# Patient Record
Sex: FEMALE | Race: WHITE | NOT HISPANIC OR LATINO | URBAN - METROPOLITAN AREA
[De-identification: names, ages, dates, MRNs, and addresses within clinical notes are randomized per-mention and may not be internally consistent; named-entity substitution may affect disease eponyms.]

---

## 2023-07-27 ENCOUNTER — INPATIENT (INPATIENT)
Facility: HOSPITAL | Age: 89
LOS: 2 days | Discharge: HOME HEALTH | DRG: 682 | End: 2023-07-30
Attending: INTERNAL MEDICINE | Admitting: INTERNAL MEDICINE
Payer: MEDICARE

## 2023-07-27 VITALS
TEMPERATURE: 97 F | SYSTOLIC BLOOD PRESSURE: 116 MMHG | RESPIRATION RATE: 20 BRPM | HEART RATE: 68 BPM | DIASTOLIC BLOOD PRESSURE: 56 MMHG | OXYGEN SATURATION: 97 %

## 2023-07-27 DIAGNOSIS — E86.0 DEHYDRATION: ICD-10-CM

## 2023-07-27 DIAGNOSIS — B35.1 TINEA UNGUIUM: ICD-10-CM

## 2023-07-27 DIAGNOSIS — Z74.01 BED CONFINEMENT STATUS: ICD-10-CM

## 2023-07-27 DIAGNOSIS — E87.5 HYPERKALEMIA: ICD-10-CM

## 2023-07-27 DIAGNOSIS — S82.209A UNSPECIFIED FRACTURE OF SHAFT OF UNSPECIFIED TIBIA, INITIAL ENCOUNTER FOR CLOSED FRACTURE: Chronic | ICD-10-CM

## 2023-07-27 DIAGNOSIS — Z88.0 ALLERGY STATUS TO PENICILLIN: ICD-10-CM

## 2023-07-27 DIAGNOSIS — R53.2 FUNCTIONAL QUADRIPLEGIA: ICD-10-CM

## 2023-07-27 DIAGNOSIS — M48.00 SPINAL STENOSIS, SITE UNSPECIFIED: ICD-10-CM

## 2023-07-27 DIAGNOSIS — I95.9 HYPOTENSION, UNSPECIFIED: ICD-10-CM

## 2023-07-27 DIAGNOSIS — N18.4 CHRONIC KIDNEY DISEASE, STAGE 4 (SEVERE): ICD-10-CM

## 2023-07-27 DIAGNOSIS — N39.0 URINARY TRACT INFECTION, SITE NOT SPECIFIED: ICD-10-CM

## 2023-07-27 DIAGNOSIS — R13.10 DYSPHAGIA, UNSPECIFIED: ICD-10-CM

## 2023-07-27 DIAGNOSIS — B96.20 UNSPECIFIED ESCHERICHIA COLI [E. COLI] AS THE CAUSE OF DISEASES CLASSIFIED ELSEWHERE: ICD-10-CM

## 2023-07-27 DIAGNOSIS — I45.2 BIFASCICULAR BLOCK: ICD-10-CM

## 2023-07-27 DIAGNOSIS — Z66 DO NOT RESUSCITATE: ICD-10-CM

## 2023-07-27 DIAGNOSIS — Z88.6 ALLERGY STATUS TO ANALGESIC AGENT: ICD-10-CM

## 2023-07-27 DIAGNOSIS — Z95.818 PRESENCE OF OTHER CARDIAC IMPLANTS AND GRAFTS: Chronic | ICD-10-CM

## 2023-07-27 DIAGNOSIS — K43.9 VENTRAL HERNIA WITHOUT OBSTRUCTION OR GANGRENE: ICD-10-CM

## 2023-07-27 DIAGNOSIS — R79.89 OTHER SPECIFIED ABNORMAL FINDINGS OF BLOOD CHEMISTRY: ICD-10-CM

## 2023-07-27 DIAGNOSIS — F03.90 UNSPECIFIED DEMENTIA WITHOUT BEHAVIORAL DISTURBANCE: ICD-10-CM

## 2023-07-27 DIAGNOSIS — I44.0 ATRIOVENTRICULAR BLOCK, FIRST DEGREE: ICD-10-CM

## 2023-07-27 DIAGNOSIS — I12.9 HYPERTENSIVE CHRONIC KIDNEY DISEASE WITH STAGE 1 THROUGH STAGE 4 CHRONIC KIDNEY DISEASE, OR UNSPECIFIED CHRONIC KIDNEY DISEASE: ICD-10-CM

## 2023-07-27 DIAGNOSIS — I48.20 CHRONIC ATRIAL FIBRILLATION, UNSPECIFIED: ICD-10-CM

## 2023-07-27 DIAGNOSIS — C84.00 MYCOSIS FUNGOIDES, UNSPECIFIED SITE: ICD-10-CM

## 2023-07-27 DIAGNOSIS — Z90.710 ACQUIRED ABSENCE OF BOTH CERVIX AND UTERUS: ICD-10-CM

## 2023-07-27 DIAGNOSIS — R00.1 BRADYCARDIA, UNSPECIFIED: ICD-10-CM

## 2023-07-27 DIAGNOSIS — Z86.73 PERSONAL HISTORY OF TRANSIENT ISCHEMIC ATTACK (TIA), AND CEREBRAL INFARCTION WITHOUT RESIDUAL DEFICITS: ICD-10-CM

## 2023-07-27 DIAGNOSIS — N17.9 ACUTE KIDNEY FAILURE, UNSPECIFIED: ICD-10-CM

## 2023-07-27 DIAGNOSIS — R82.71 BACTERIURIA: ICD-10-CM

## 2023-07-27 LAB
ALBUMIN SERPL ELPH-MCNC: 3.9 G/DL — SIGNIFICANT CHANGE UP (ref 3.5–5.2)
ALBUMIN SERPL ELPH-MCNC: 4.2 G/DL — SIGNIFICANT CHANGE UP (ref 3.5–5.2)
ALP SERPL-CCNC: 110 U/L — SIGNIFICANT CHANGE UP (ref 30–115)
ALP SERPL-CCNC: 116 U/L — HIGH (ref 30–115)
ALT FLD-CCNC: 9 U/L — SIGNIFICANT CHANGE UP (ref 0–41)
ALT FLD-CCNC: 9 U/L — SIGNIFICANT CHANGE UP (ref 0–41)
ANION GAP SERPL CALC-SCNC: 13 MMOL/L — SIGNIFICANT CHANGE UP (ref 7–14)
ANION GAP SERPL CALC-SCNC: 14 MMOL/L — SIGNIFICANT CHANGE UP (ref 7–14)
ANION GAP SERPL CALC-SCNC: 14 MMOL/L — SIGNIFICANT CHANGE UP (ref 7–14)
ANION GAP SERPL CALC-SCNC: 15 MMOL/L — HIGH (ref 7–14)
APPEARANCE UR: CLEAR — SIGNIFICANT CHANGE UP
AST SERPL-CCNC: 11 U/L — SIGNIFICANT CHANGE UP (ref 0–41)
AST SERPL-CCNC: 18 U/L — SIGNIFICANT CHANGE UP (ref 0–41)
BASOPHILS # BLD AUTO: 0.04 K/UL — SIGNIFICANT CHANGE UP (ref 0–0.2)
BASOPHILS NFR BLD AUTO: 0.5 % — SIGNIFICANT CHANGE UP (ref 0–1)
BILIRUB DIRECT SERPL-MCNC: <0.2 MG/DL — SIGNIFICANT CHANGE UP (ref 0–0.3)
BILIRUB INDIRECT FLD-MCNC: >0.2 MG/DL — SIGNIFICANT CHANGE UP (ref 0.2–1.2)
BILIRUB SERPL-MCNC: 0.4 MG/DL — SIGNIFICANT CHANGE UP (ref 0.2–1.2)
BILIRUB SERPL-MCNC: 0.4 MG/DL — SIGNIFICANT CHANGE UP (ref 0.2–1.2)
BILIRUB SERPL-MCNC: 0.5 MG/DL — SIGNIFICANT CHANGE UP (ref 0.2–1.2)
BILIRUB UR-MCNC: NEGATIVE — SIGNIFICANT CHANGE UP
BUN SERPL-MCNC: 48 MG/DL — HIGH (ref 10–20)
BUN SERPL-MCNC: 50 MG/DL — HIGH (ref 10–20)
CALCIUM SERPL-MCNC: 8.7 MG/DL — SIGNIFICANT CHANGE UP (ref 8.4–10.5)
CALCIUM SERPL-MCNC: 8.9 MG/DL — SIGNIFICANT CHANGE UP (ref 8.4–10.5)
CALCIUM SERPL-MCNC: 9 MG/DL — SIGNIFICANT CHANGE UP (ref 8.4–10.5)
CALCIUM SERPL-MCNC: 9.3 MG/DL — SIGNIFICANT CHANGE UP (ref 8.4–10.5)
CHLORIDE SERPL-SCNC: 95 MMOL/L — LOW (ref 98–110)
CHLORIDE SERPL-SCNC: 95 MMOL/L — LOW (ref 98–110)
CHLORIDE SERPL-SCNC: 97 MMOL/L — LOW (ref 98–110)
CHLORIDE SERPL-SCNC: 99 MMOL/L — SIGNIFICANT CHANGE UP (ref 98–110)
CK MB CFR SERPL CALC: 1.7 NG/ML — SIGNIFICANT CHANGE UP (ref 0.6–6.3)
CK SERPL-CCNC: 67 U/L — SIGNIFICANT CHANGE UP (ref 0–225)
CO2 SERPL-SCNC: 18 MMOL/L — SIGNIFICANT CHANGE UP (ref 17–32)
CO2 SERPL-SCNC: 20 MMOL/L — SIGNIFICANT CHANGE UP (ref 17–32)
CO2 SERPL-SCNC: 22 MMOL/L — SIGNIFICANT CHANGE UP (ref 17–32)
CO2 SERPL-SCNC: 22 MMOL/L — SIGNIFICANT CHANGE UP (ref 17–32)
COLOR SPEC: YELLOW — SIGNIFICANT CHANGE UP
CREAT SERPL-MCNC: 2.6 MG/DL — HIGH (ref 0.7–1.5)
CREAT SERPL-MCNC: 2.7 MG/DL — HIGH (ref 0.7–1.5)
CREAT SERPL-MCNC: 2.8 MG/DL — HIGH (ref 0.7–1.5)
CREAT SERPL-MCNC: 2.8 MG/DL — HIGH (ref 0.7–1.5)
DIFF PNL FLD: ABNORMAL
EGFR: 15 ML/MIN/1.73M2 — LOW
EGFR: 15 ML/MIN/1.73M2 — LOW
EGFR: 16 ML/MIN/1.73M2 — LOW
EGFR: 17 ML/MIN/1.73M2 — LOW
EOSINOPHIL # BLD AUTO: 0.21 K/UL — SIGNIFICANT CHANGE UP (ref 0–0.7)
EOSINOPHIL NFR BLD AUTO: 2.7 % — SIGNIFICANT CHANGE UP (ref 0–8)
GLUCOSE SERPL-MCNC: 106 MG/DL — HIGH (ref 70–99)
GLUCOSE SERPL-MCNC: 122 MG/DL — HIGH (ref 70–99)
GLUCOSE SERPL-MCNC: 145 MG/DL — HIGH (ref 70–99)
GLUCOSE SERPL-MCNC: 89 MG/DL — SIGNIFICANT CHANGE UP (ref 70–99)
GLUCOSE UR QL: NEGATIVE MG/DL — SIGNIFICANT CHANGE UP
HCT VFR BLD CALC: 33.8 % — LOW (ref 37–47)
HGB BLD-MCNC: 11 G/DL — LOW (ref 12–16)
IMM GRANULOCYTES NFR BLD AUTO: 0.4 % — HIGH (ref 0.1–0.3)
KETONES UR-MCNC: NEGATIVE — SIGNIFICANT CHANGE UP
LEUKOCYTE ESTERASE UR-ACNC: ABNORMAL
LIDOCAIN IGE QN: 49 U/L — SIGNIFICANT CHANGE UP (ref 7–60)
LYMPHOCYTES # BLD AUTO: 1.09 K/UL — LOW (ref 1.2–3.4)
LYMPHOCYTES # BLD AUTO: 13.9 % — LOW (ref 20.5–51.1)
MAGNESIUM SERPL-MCNC: 2.2 MG/DL — SIGNIFICANT CHANGE UP (ref 1.8–2.4)
MCHC RBC-ENTMCNC: 29.6 PG — SIGNIFICANT CHANGE UP (ref 27–31)
MCHC RBC-ENTMCNC: 32.5 G/DL — SIGNIFICANT CHANGE UP (ref 32–37)
MCV RBC AUTO: 91.1 FL — SIGNIFICANT CHANGE UP (ref 81–99)
MONOCYTES # BLD AUTO: 0.42 K/UL — SIGNIFICANT CHANGE UP (ref 0.1–0.6)
MONOCYTES NFR BLD AUTO: 5.4 % — SIGNIFICANT CHANGE UP (ref 1.7–9.3)
NEUTROPHILS # BLD AUTO: 6.05 K/UL — SIGNIFICANT CHANGE UP (ref 1.4–6.5)
NEUTROPHILS NFR BLD AUTO: 77.1 % — HIGH (ref 42.2–75.2)
NITRITE UR-MCNC: NEGATIVE — SIGNIFICANT CHANGE UP
NRBC # BLD: 0 /100 WBCS — SIGNIFICANT CHANGE UP (ref 0–0)
NT-PROBNP SERPL-SCNC: 572 PG/ML — HIGH (ref 0–300)
PH UR: 6 — SIGNIFICANT CHANGE UP (ref 5–8)
PLATELET # BLD AUTO: 304 K/UL — SIGNIFICANT CHANGE UP (ref 130–400)
PMV BLD: 9.6 FL — SIGNIFICANT CHANGE UP (ref 7.4–10.4)
POTASSIUM SERPL-MCNC: 4.9 MMOL/L — SIGNIFICANT CHANGE UP (ref 3.5–5)
POTASSIUM SERPL-MCNC: 5.4 MMOL/L — HIGH (ref 3.5–5)
POTASSIUM SERPL-MCNC: 5.6 MMOL/L — HIGH (ref 3.5–5)
POTASSIUM SERPL-MCNC: 6.4 MMOL/L — CRITICAL HIGH (ref 3.5–5)
POTASSIUM SERPL-SCNC: 4.9 MMOL/L — SIGNIFICANT CHANGE UP (ref 3.5–5)
POTASSIUM SERPL-SCNC: 5.4 MMOL/L — HIGH (ref 3.5–5)
POTASSIUM SERPL-SCNC: 5.6 MMOL/L — HIGH (ref 3.5–5)
POTASSIUM SERPL-SCNC: 6.4 MMOL/L — CRITICAL HIGH (ref 3.5–5)
PROT SERPL-MCNC: 7.1 G/DL — SIGNIFICANT CHANGE UP (ref 6–8)
PROT SERPL-MCNC: 7.4 G/DL — SIGNIFICANT CHANGE UP (ref 6–8)
PROT UR-MCNC: ABNORMAL MG/DL
RBC # BLD: 3.71 M/UL — LOW (ref 4.2–5.4)
RBC # FLD: 13.2 % — SIGNIFICANT CHANGE UP (ref 11.5–14.5)
SODIUM SERPL-SCNC: 129 MMOL/L — LOW (ref 135–146)
SODIUM SERPL-SCNC: 130 MMOL/L — LOW (ref 135–146)
SODIUM SERPL-SCNC: 131 MMOL/L — LOW (ref 135–146)
SODIUM SERPL-SCNC: 134 MMOL/L — LOW (ref 135–146)
SP GR SPEC: 1.01 — SIGNIFICANT CHANGE UP (ref 1.01–1.03)
TROPONIN T SERPL-MCNC: 0.02 NG/ML — HIGH
TROPONIN T SERPL-MCNC: <0.01 NG/ML — SIGNIFICANT CHANGE UP
UROBILINOGEN FLD QL: 0.2 MG/DL — SIGNIFICANT CHANGE UP
WBC # BLD: 7.84 K/UL — SIGNIFICANT CHANGE UP (ref 4.8–10.8)
WBC # FLD AUTO: 7.84 K/UL — SIGNIFICANT CHANGE UP (ref 4.8–10.8)

## 2023-07-27 PROCEDURE — 74176 CT ABD & PELVIS W/O CONTRAST: CPT | Mod: 26,MA

## 2023-07-27 PROCEDURE — 82553 CREATINE MB FRACTION: CPT

## 2023-07-27 PROCEDURE — 85025 COMPLETE CBC W/AUTO DIFF WBC: CPT

## 2023-07-27 PROCEDURE — 92610 EVALUATE SWALLOWING FUNCTION: CPT | Mod: GN

## 2023-07-27 PROCEDURE — 80053 COMPREHEN METABOLIC PANEL: CPT

## 2023-07-27 PROCEDURE — 87040 BLOOD CULTURE FOR BACTERIA: CPT

## 2023-07-27 PROCEDURE — 80048 BASIC METABOLIC PNL TOTAL CA: CPT

## 2023-07-27 PROCEDURE — 99223 1ST HOSP IP/OBS HIGH 75: CPT

## 2023-07-27 PROCEDURE — 82962 GLUCOSE BLOOD TEST: CPT

## 2023-07-27 PROCEDURE — 71250 CT THORAX DX C-: CPT | Mod: 26,MA

## 2023-07-27 PROCEDURE — 99285 EMERGENCY DEPT VISIT HI MDM: CPT | Mod: FS

## 2023-07-27 PROCEDURE — 97162 PT EVAL MOD COMPLEX 30 MIN: CPT | Mod: GP

## 2023-07-27 PROCEDURE — 80061 LIPID PANEL: CPT

## 2023-07-27 PROCEDURE — 93306 TTE W/DOPPLER COMPLETE: CPT

## 2023-07-27 PROCEDURE — 76770 US EXAM ABDO BACK WALL COMP: CPT | Mod: 26

## 2023-07-27 PROCEDURE — 71045 X-RAY EXAM CHEST 1 VIEW: CPT | Mod: 26

## 2023-07-27 PROCEDURE — 83735 ASSAY OF MAGNESIUM: CPT

## 2023-07-27 PROCEDURE — 36415 COLL VENOUS BLD VENIPUNCTURE: CPT

## 2023-07-27 PROCEDURE — 76770 US EXAM ABDO BACK WALL COMP: CPT

## 2023-07-27 PROCEDURE — 82550 ASSAY OF CK (CPK): CPT

## 2023-07-27 PROCEDURE — 84484 ASSAY OF TROPONIN QUANT: CPT

## 2023-07-27 PROCEDURE — 93005 ELECTROCARDIOGRAM TRACING: CPT

## 2023-07-27 RX ORDER — ALBUTEROL 90 UG/1
2.5 AEROSOL, METERED ORAL ONCE
Refills: 0 | Status: COMPLETED | OUTPATIENT
Start: 2023-07-27 | End: 2023-07-27

## 2023-07-27 RX ORDER — CITALOPRAM 10 MG/1
10 TABLET, FILM COATED ORAL AT BEDTIME
Refills: 0 | Status: DISCONTINUED | OUTPATIENT
Start: 2023-07-27 | End: 2023-07-30

## 2023-07-27 RX ORDER — CEFTRIAXONE 500 MG/1
1000 INJECTION, POWDER, FOR SOLUTION INTRAMUSCULAR; INTRAVENOUS EVERY 24 HOURS
Refills: 0 | Status: COMPLETED | OUTPATIENT
Start: 2023-07-27 | End: 2023-07-30

## 2023-07-27 RX ORDER — AMIODARONE HYDROCHLORIDE 400 MG/1
100 TABLET ORAL DAILY
Refills: 0 | Status: DISCONTINUED | OUTPATIENT
Start: 2023-07-27 | End: 2023-07-30

## 2023-07-27 RX ORDER — FUROSEMIDE 40 MG
1 TABLET ORAL
Refills: 0 | DISCHARGE

## 2023-07-27 RX ORDER — AMIODARONE HYDROCHLORIDE 400 MG/1
1 TABLET ORAL
Refills: 0 | DISCHARGE

## 2023-07-27 RX ORDER — SODIUM ZIRCONIUM CYCLOSILICATE 10 G/10G
10 POWDER, FOR SUSPENSION ORAL ONCE
Refills: 0 | Status: COMPLETED | OUTPATIENT
Start: 2023-07-27 | End: 2023-07-27

## 2023-07-27 RX ORDER — SPIRONOLACTONE 25 MG/1
1 TABLET, FILM COATED ORAL
Refills: 0 | DISCHARGE

## 2023-07-27 RX ORDER — ONDANSETRON 8 MG/1
4 TABLET, FILM COATED ORAL EVERY 8 HOURS
Refills: 0 | Status: DISCONTINUED | OUTPATIENT
Start: 2023-07-27 | End: 2023-07-30

## 2023-07-27 RX ORDER — AMLODIPINE BESYLATE 2.5 MG/1
1 TABLET ORAL
Refills: 0 | DISCHARGE

## 2023-07-27 RX ORDER — PREGABALIN 225 MG/1
1000 CAPSULE ORAL DAILY
Refills: 0 | Status: DISCONTINUED | OUTPATIENT
Start: 2023-07-27 | End: 2023-07-30

## 2023-07-27 RX ORDER — FOLIC ACID 0.8 MG
1 TABLET ORAL AT BEDTIME
Refills: 0 | Status: DISCONTINUED | OUTPATIENT
Start: 2023-07-27 | End: 2023-07-30

## 2023-07-27 RX ORDER — FUROSEMIDE 40 MG
20 TABLET ORAL ONCE
Refills: 0 | Status: COMPLETED | OUTPATIENT
Start: 2023-07-27 | End: 2023-07-27

## 2023-07-27 RX ORDER — PANTOPRAZOLE SODIUM 20 MG/1
40 TABLET, DELAYED RELEASE ORAL
Refills: 0 | Status: DISCONTINUED | OUTPATIENT
Start: 2023-07-27 | End: 2023-07-30

## 2023-07-27 RX ORDER — CLOPIDOGREL BISULFATE 75 MG/1
1 TABLET, FILM COATED ORAL
Refills: 0 | DISCHARGE

## 2023-07-27 RX ORDER — LANOLIN ALCOHOL/MO/W.PET/CERES
3 CREAM (GRAM) TOPICAL AT BEDTIME
Refills: 0 | Status: DISCONTINUED | OUTPATIENT
Start: 2023-07-27 | End: 2023-07-30

## 2023-07-27 RX ORDER — METOCLOPRAMIDE HCL 10 MG
10 TABLET ORAL ONCE
Refills: 0 | Status: DISCONTINUED | OUTPATIENT
Start: 2023-07-27 | End: 2023-07-27

## 2023-07-27 RX ORDER — CEFTRIAXONE 500 MG/1
1000 INJECTION, POWDER, FOR SOLUTION INTRAMUSCULAR; INTRAVENOUS ONCE
Refills: 0 | Status: COMPLETED | OUTPATIENT
Start: 2023-07-27 | End: 2023-07-27

## 2023-07-27 RX ORDER — VALSARTAN 80 MG/1
1 TABLET ORAL
Refills: 0 | DISCHARGE

## 2023-07-27 RX ORDER — FOLIC ACID 0.8 MG
1 TABLET ORAL
Refills: 0 | DISCHARGE

## 2023-07-27 RX ORDER — ONDANSETRON 8 MG/1
4 TABLET, FILM COATED ORAL EVERY 8 HOURS
Refills: 0 | Status: DISCONTINUED | OUTPATIENT
Start: 2023-07-27 | End: 2023-07-27

## 2023-07-27 RX ORDER — CITALOPRAM 10 MG/1
1 TABLET, FILM COATED ORAL
Refills: 0 | DISCHARGE

## 2023-07-27 RX ORDER — CHLORHEXIDINE GLUCONATE 213 G/1000ML
1 SOLUTION TOPICAL
Refills: 0 | Status: DISCONTINUED | OUTPATIENT
Start: 2023-07-27 | End: 2023-07-30

## 2023-07-27 RX ORDER — HEPARIN SODIUM 5000 [USP'U]/ML
5000 INJECTION INTRAVENOUS; SUBCUTANEOUS EVERY 12 HOURS
Refills: 0 | Status: DISCONTINUED | OUTPATIENT
Start: 2023-07-27 | End: 2023-07-30

## 2023-07-27 RX ORDER — ACETAMINOPHEN 500 MG
650 TABLET ORAL EVERY 6 HOURS
Refills: 0 | Status: DISCONTINUED | OUTPATIENT
Start: 2023-07-27 | End: 2023-07-30

## 2023-07-27 RX ORDER — SODIUM CHLORIDE 9 MG/ML
1000 INJECTION INTRAMUSCULAR; INTRAVENOUS; SUBCUTANEOUS
Refills: 0 | Status: DISCONTINUED | OUTPATIENT
Start: 2023-07-27 | End: 2023-07-27

## 2023-07-27 RX ORDER — SODIUM ZIRCONIUM CYCLOSILICATE 10 G/10G
5 POWDER, FOR SUSPENSION ORAL DAILY
Refills: 0 | Status: DISCONTINUED | OUTPATIENT
Start: 2023-07-27 | End: 2023-07-27

## 2023-07-27 RX ORDER — SODIUM CHLORIDE 9 MG/ML
500 INJECTION INTRAMUSCULAR; INTRAVENOUS; SUBCUTANEOUS ONCE
Refills: 0 | Status: COMPLETED | OUTPATIENT
Start: 2023-07-27 | End: 2023-07-27

## 2023-07-27 RX ORDER — CLOPIDOGREL BISULFATE 75 MG/1
75 TABLET, FILM COATED ORAL DAILY
Refills: 0 | Status: DISCONTINUED | OUTPATIENT
Start: 2023-07-28 | End: 2023-07-30

## 2023-07-27 RX ORDER — SODIUM CHLORIDE 9 MG/ML
1000 INJECTION INTRAMUSCULAR; INTRAVENOUS; SUBCUTANEOUS
Refills: 0 | Status: DISCONTINUED | OUTPATIENT
Start: 2023-07-27 | End: 2023-07-28

## 2023-07-27 RX ORDER — ALBUTEROL 90 UG/1
10 AEROSOL, METERED ORAL ONCE
Refills: 0 | Status: DISCONTINUED | OUTPATIENT
Start: 2023-07-27 | End: 2023-07-27

## 2023-07-27 RX ADMIN — Medication 20 MILLIGRAM(S): at 17:57

## 2023-07-27 RX ADMIN — SODIUM CHLORIDE 50 MILLILITER(S): 9 INJECTION INTRAMUSCULAR; INTRAVENOUS; SUBCUTANEOUS at 21:29

## 2023-07-27 RX ADMIN — SODIUM CHLORIDE 500 MILLILITER(S): 9 INJECTION INTRAMUSCULAR; INTRAVENOUS; SUBCUTANEOUS at 17:15

## 2023-07-27 RX ADMIN — CITALOPRAM 10 MILLIGRAM(S): 10 TABLET, FILM COATED ORAL at 21:28

## 2023-07-27 RX ADMIN — CEFTRIAXONE 100 MILLIGRAM(S): 500 INJECTION, POWDER, FOR SOLUTION INTRAMUSCULAR; INTRAVENOUS at 19:01

## 2023-07-27 RX ADMIN — ALBUTEROL 2.5 MILLIGRAM(S): 90 AEROSOL, METERED ORAL at 19:16

## 2023-07-27 RX ADMIN — Medication 1 MILLIGRAM(S): at 21:28

## 2023-07-27 RX ADMIN — SODIUM ZIRCONIUM CYCLOSILICATE 10 GRAM(S): 10 POWDER, FOR SUSPENSION ORAL at 18:02

## 2023-07-27 NOTE — ED ADULT TRIAGE NOTE - CHIEF COMPLAINT QUOTE
BIBA from home, as per EMS family reported patients blood pressure has been low x 1 week (70 systolic) and had one episode of vomiting today.

## 2023-07-27 NOTE — H&P ADULT - ASSESSMENT
A 89 y/o F with pmhx of bedbound,  HTN, CVA without residual deficits, dementia, A-fib status post Watchman, ventral hernia,  admission in May for right tib-fib fracture   brought in for multiple emesis this morning after eating breakfast.      #UTI  #hx urinary incontinence s/p straight cath   -IV abx Rocephin 1 g q 24 hrs  -follow up Ucx  -monitor for fever     #NINA   s/p 500 cc bolus   -trend cr level   -gentle IV hydration 50 cc x 12 hours   -monitor I and O   -avoid nephrotoxic meds    #hyperkalemia   -s/p LOKELMA   -repeat BMP at 10 pm   -monitor K level     #elevated troponin likely due to NINA   EKG NS bradycardia with 1st degree A-V block  Incomplete left bundle branch block  -trend cardiac enzymes  -cardiac monitor   -repeat EKG in am     #nausea/vomiting   CT a/p Ventral hernia measuring 8.5 cm containing short segment of   nonobstructed transverse colon  -abdomen soft , hernia reducible  -zofran prn   -ppi  -gentle hydration        #hx HTN   -patient hypotensive at home BP 73/33  -will hold BP meds     #hx  CVA without residual deficits  -cw Plavix       #hx A-fib status post Watchman  cw amiodarone with holding parameters     DVT prophylaxis   GI prophylaxis   CODE status DRN/DNI

## 2023-07-27 NOTE — ED PROVIDER NOTE - CLINICAL SUMMARY MEDICAL DECISION MAKING FREE TEXT BOX
89-year-old female with history of hypertension, CVA without residual deficits, dementia, A-fib status post Watchman, admission in May for right tib-fib fracture now at home with home health aide presents to the ED brought in by her daughter and health aide after an episode of multiple emesis, nonbloody nonbilious this morning after eating breakfast.  Patient is otherwise at her baseline and has been at her baseline throughout the week.  Since vomiting, per health aide patient also complaining of abdominal pain.  Patient's dementia limits her history otherwise.  Family members have not noted any diarrhea, not complaining of urinary symptoms, tolerating p.o. though less today.    On exam, vital signs within normal limits.  Head is normocephalic and atraumatic, patient in no acute distress.  Tacky mucous membranes.  Normal conjunctiva.  Extraocular movements intact, pupils equal round reactive to light.  Heart is regular rate and rhythm, lung exam notable for crackles to right base.  Abdomen is soft, there is an umbilical hernia without overlying skin changes which is reducible, minimally tender.  No other abdominal pain.  There is a stage I sacral ulcer which does not appear infected.  No peripheral edema.    Differential includes UTI, obstruction, electrolyte abnormality, ischemia, dehydration, gastroenteritis early, ACS    Plan for chest x-ray, CT abdomen pelvis, labs, gentle hydration, reassess

## 2023-07-27 NOTE — H&P ADULT - NS ATTEND AMEND GEN_ALL_CORE FT
patient seen and examined with PA at bedside on day of admission 7/27/23  daughter at bedside  plana above

## 2023-07-27 NOTE — H&P ADULT - HISTORY OF PRESENT ILLNESS
A 89 y/o F with pmhx of bedbound,  HTN, CVA without residual deficits, dementia, A-fib status post Watchman, ventral hernia,  admission in May for right tib-fib fracture   brought in for multiple emesis this morning after eating breakfast.   Pt poor historian due to dementia, per daughter at bedside patient vomited today few times NBNB after eating breakfast.  PT has denies any abdominal pain. Pt had and episode of loose stool, non bloody today. PT found to be hypotensive at home with BP 72/73. Pt denies dizziness or LOC.  PT denies chest pain or shortness of breath. Pt with hx of uti , last on May, Pt with incontinence, per daughter has been making good urine , no hematuria. PT denies fever, chills.

## 2023-07-27 NOTE — ED ADULT NURSE NOTE - NSFALLHARMRISKINTERV_ED_ALL_ED

## 2023-07-27 NOTE — PATIENT PROFILE ADULT - FALL HARM RISK - HARM RISK INTERVENTIONS

## 2023-07-27 NOTE — ED PROVIDER NOTE - NSICDXPASTMEDICALHX_GEN_ALL_CORE_FT
PAST MEDICAL HISTORY:  Chronic atrial fibrillation     CVA (cerebrovascular accident)     Dementia     HTN (hypertension)

## 2023-07-27 NOTE — ED PROVIDER NOTE - PHYSICAL EXAMINATION
Physical Exam    Constitutional: No acute distress.   Eyes: Conjunctiva pink, Sclera clear, PERRLA, EOMI.  ENT: No sinus tenderness. No nasal discharge. No oropharyngeal erythema, edema, or exudates. Uvula midline.   Cardiovascular: Regular rate, regular rhythm. No noted murmurs rubs or gallops.  Respiratory: unlabored respiratory effort, clear to auscultation bilaterally no wheezing, rales or rhonchi  Gastrointestinal: Normal bowel sounds. soft, non distended, Large reducible umbilical hernia  Musculoskeletal: supple neck, no midline tenderness. No joint or bony deformity.   Integumentary: warm, dry, no rash  Neurologic: awake, disoriented. non focal  Psychiatric: appropriate mood, appropriate affect

## 2023-07-27 NOTE — GOALS OF CARE CONVERSATION - ADVANCED CARE PLANNING - CONVERSATION DETAILS
Spoke to daughter about code status , current medical intervention and prognosis. The daughter wishes her mother to be DNR /DNI . Also addressed feeding tube /long term nutrition . At this time the daughter reported she needs to think about it. She wishes to continue with any other necessary medical intervention.

## 2023-07-27 NOTE — ED PROVIDER NOTE - CARE PLAN
1 Principal Discharge DX:	Acute UTI  Secondary Diagnosis:	Hyperkalemia  Secondary Diagnosis:	NINA (acute kidney injury)  Secondary Diagnosis:	Dehydration

## 2023-07-27 NOTE — H&P ADULT - NSHPLABSRESULTS_GEN_ALL_CORE
11.0   7.84  )-----------( 304      ( 27 Jul 2023 16:49 )             33.8       07-27    131<L>  |  95<L>  |  50<H>  ----------------------------<  89  5.6<H>   |  22  |  2.7<H>    Ca    9.3      27 Jul 2023 16:49  Mg     2.2     07-27    TPro  7.4  /  Alb  4.2  /  TBili  0.5  /  DBili  x   /  AST  11  /  ALT  9   /  AlkPhos  116<H>  07-27          Magnesium: 2.2 mg/dL (07-27-23 @ 14:39)          Urinalysis Basic - ( 27 Jul 2023 16:49 )    Color: x / Appearance: x / SG: x / pH: x  Gluc: 89 mg/dL / Ketone: x  / Bili: x / Urobili: x   Blood: x / Protein: x / Nitrite: x   Leuk Esterase: x / RBC: x / WBC x   Sq Epi: x / Non Sq Epi: x / Bacteria: x            Lactate Trend      CARDIAC MARKERS ( 27 Jul 2023 14:39 )  x     / 0.02 ng/mL / x     / x     / x                        < from: CT Chest No Cont (07.27.23 @ 17:52) >      IMPRESSION:    1. No evidence of acute intrathoracic or abdominal pathology.    2. Ventral hernia measuring 8.5 cm containing short segment of   nonobstructed transverse colon.    --- End of Report ---      DUYEN VIEIRA MD; Attending Radiologist  This document has been electronically signed. Jul 27 2023  6:03PM    < end of copied text >    < from: Xray Chest 1 View- PORTABLE-Urgent (07.27.23 @ 15:06) >    Impression:    Low lung volumes with likely vascular crowding versus congestion.    No pneumothorax.    --- End of Report ---            ELIAS TOUSSAINT MD; Attending Radiologist  This document has been electronically signed. Jul 27 2023  5:29PM    < end of copied text >

## 2023-07-27 NOTE — H&P ADULT - NSHPPHYSICALEXAM_GEN_ALL_CORE
VITALS:   ICU Vital Signs Last 24 Hrs  T(C): 36.2 (27 Jul 2023 13:42), Max: 36.2 (27 Jul 2023 13:42)  T(F): 97.1 (27 Jul 2023 13:42), Max: 97.1 (27 Jul 2023 13:42)  HR: 61 (27 Jul 2023 18:44) (61 - 68)  BP: 120/58 (27 Jul 2023 18:44) (116/56 - 120/58)  RR: 18 (27 Jul 2023 18:44) (18 - 20)  SpO2: 98% (27 Jul 2023 18:44) (97% - 98%)    O2 Parameters below as of 27 Jul 2023 18:44  Patient On (Oxygen Delivery Method): room air    GENERAL: NAD, lying in bed comfortably  HEAD:  Atraumatic, Normocephalic  EYES: EOMI, PERRLA, conjunctiva and sclera clear  ENT: Moist mucous membranes  NECK: Supple, No JVD  CHEST/LUNG: Clear to auscultation bilaterally; No rales, rhonchi, wheezing, or rubs. Unlabored respirations on RA sat 97 %  HEART:  neelima 56, Regular rate and rhythm; No murmurs, rubs, or gallops  ABDOMEN: obese, ventral hernia, soft reducible ,non tender,  Nondistended.   EXTREMITIES: no edema or tenderness   NERVOUS SYSTEM:  Alert & Oriented X3, speech clear. No deficits   MSK: FROM all 4 extremities, full and equal strength  SKIN: stage I sacral ulcer

## 2023-07-27 NOTE — ED PROVIDER NOTE - NSICDXPASTSURGICALHX_GEN_ALL_CORE_FT
PAST SURGICAL HISTORY:  Fracture, tibia and fibula     Presence of Watchman left atrial appendage closure device

## 2023-07-27 NOTE — ED PROVIDER NOTE - OBJECTIVE STATEMENT
89 year old female with a history of HTN, Afib s/p watchman, CVA 6 years ago, Dementia, Spinal stenosis, Hysterectomy, 89 year old female with a history of HTN, Afib s/p watchman, CVA 6 years ago, Dementia, Spinal stenosis, Hysterectomy, presents to the ED with her home attendant and daughter reporting low blood pressure x 1 week. Patient is poor historian due to mental status therefore her Daughter is providing history. States that visiting nurse noted BP low this week and SBP down to 70 today. Daughter notes more fatigue, decreased appetite and 1 episode of vomiting today. Otherwise denies new symptoms such as fever, difficulty breathing, chest pains, diarrhea, changes in urination, skin rash/breakdown.

## 2023-07-27 NOTE — ED PROVIDER NOTE - PROGRESS NOTE DETAILS
BF: elevated creatinnine, no prior for comparison. HyperK without EKg changes other than mild bradycardia, given albuterol, lasix, and lokelma to treat. Mild anemia, unknown chronicity. Imaging unremarkable. UA notable for UTI. Admitted to [facility].

## 2023-07-27 NOTE — H&P ADULT - CONVERSATION DETAILS
Current plan of care including prognosis discussed by attending  with patient and daughter  all options were discussed and opted for DNR/DNI understanding of what is involved in detail. Attending filled  MOLST form .

## 2023-07-28 LAB
ALBUMIN SERPL ELPH-MCNC: 4 G/DL — SIGNIFICANT CHANGE UP (ref 3.5–5.2)
ALP SERPL-CCNC: 106 U/L — SIGNIFICANT CHANGE UP (ref 30–115)
ALT FLD-CCNC: 7 U/L — SIGNIFICANT CHANGE UP (ref 0–41)
ANION GAP SERPL CALC-SCNC: 11 MMOL/L — SIGNIFICANT CHANGE UP (ref 7–14)
ANION GAP SERPL CALC-SCNC: 14 MMOL/L — SIGNIFICANT CHANGE UP (ref 7–14)
AST SERPL-CCNC: 10 U/L — SIGNIFICANT CHANGE UP (ref 0–41)
BILIRUB SERPL-MCNC: 0.4 MG/DL — SIGNIFICANT CHANGE UP (ref 0.2–1.2)
BUN SERPL-MCNC: 46 MG/DL — HIGH (ref 10–20)
BUN SERPL-MCNC: 47 MG/DL — HIGH (ref 10–20)
CALCIUM SERPL-MCNC: 9 MG/DL — SIGNIFICANT CHANGE UP (ref 8.4–10.5)
CALCIUM SERPL-MCNC: 9.5 MG/DL — SIGNIFICANT CHANGE UP (ref 8.4–10.5)
CHLORIDE SERPL-SCNC: 100 MMOL/L — SIGNIFICANT CHANGE UP (ref 98–110)
CHLORIDE SERPL-SCNC: 101 MMOL/L — SIGNIFICANT CHANGE UP (ref 98–110)
CK MB CFR SERPL CALC: 2 NG/ML — SIGNIFICANT CHANGE UP (ref 0.6–6.3)
CK SERPL-CCNC: 58 U/L — SIGNIFICANT CHANGE UP (ref 0–225)
CO2 SERPL-SCNC: 23 MMOL/L — SIGNIFICANT CHANGE UP (ref 17–32)
CO2 SERPL-SCNC: 25 MMOL/L — SIGNIFICANT CHANGE UP (ref 17–32)
CREAT SERPL-MCNC: 2.6 MG/DL — HIGH (ref 0.7–1.5)
CREAT SERPL-MCNC: 2.6 MG/DL — HIGH (ref 0.7–1.5)
EGFR: 17 ML/MIN/1.73M2 — LOW
EGFR: 17 ML/MIN/1.73M2 — LOW
GLUCOSE BLDC GLUCOMTR-MCNC: 117 MG/DL — HIGH (ref 70–99)
GLUCOSE SERPL-MCNC: 89 MG/DL — SIGNIFICANT CHANGE UP (ref 70–99)
GLUCOSE SERPL-MCNC: 95 MG/DL — SIGNIFICANT CHANGE UP (ref 70–99)
MAGNESIUM SERPL-MCNC: 2.2 MG/DL — SIGNIFICANT CHANGE UP (ref 1.8–2.4)
POTASSIUM SERPL-MCNC: 5.1 MMOL/L — HIGH (ref 3.5–5)
POTASSIUM SERPL-MCNC: 5.4 MMOL/L — HIGH (ref 3.5–5)
POTASSIUM SERPL-SCNC: 5.1 MMOL/L — HIGH (ref 3.5–5)
POTASSIUM SERPL-SCNC: 5.4 MMOL/L — HIGH (ref 3.5–5)
PROT SERPL-MCNC: 6.8 G/DL — SIGNIFICANT CHANGE UP (ref 6–8)
SODIUM SERPL-SCNC: 134 MMOL/L — LOW (ref 135–146)
SODIUM SERPL-SCNC: 140 MMOL/L — SIGNIFICANT CHANGE UP (ref 135–146)
TROPONIN T SERPL-MCNC: 0.01 NG/ML — SIGNIFICANT CHANGE UP

## 2023-07-28 PROCEDURE — 99221 1ST HOSP IP/OBS SF/LOW 40: CPT | Mod: GC

## 2023-07-28 PROCEDURE — 99233 SBSQ HOSP IP/OBS HIGH 50: CPT

## 2023-07-28 PROCEDURE — 93306 TTE W/DOPPLER COMPLETE: CPT | Mod: 26

## 2023-07-28 PROCEDURE — 93010 ELECTROCARDIOGRAM REPORT: CPT

## 2023-07-28 RX ORDER — SODIUM ZIRCONIUM CYCLOSILICATE 10 G/10G
10 POWDER, FOR SUSPENSION ORAL ONCE
Refills: 0 | Status: COMPLETED | OUTPATIENT
Start: 2023-07-28 | End: 2023-07-28

## 2023-07-28 RX ORDER — SENNA PLUS 8.6 MG/1
2 TABLET ORAL AT BEDTIME
Refills: 0 | Status: DISCONTINUED | OUTPATIENT
Start: 2023-07-28 | End: 2023-07-30

## 2023-07-28 RX ORDER — POLYETHYLENE GLYCOL 3350 17 G/17G
17 POWDER, FOR SOLUTION ORAL DAILY
Refills: 0 | Status: DISCONTINUED | OUTPATIENT
Start: 2023-07-28 | End: 2023-07-30

## 2023-07-28 RX ADMIN — PREGABALIN 1000 MICROGRAM(S): 225 CAPSULE ORAL at 15:28

## 2023-07-28 RX ADMIN — HEPARIN SODIUM 5000 UNIT(S): 5000 INJECTION INTRAVENOUS; SUBCUTANEOUS at 19:11

## 2023-07-28 RX ADMIN — HEPARIN SODIUM 5000 UNIT(S): 5000 INJECTION INTRAVENOUS; SUBCUTANEOUS at 06:13

## 2023-07-28 RX ADMIN — SODIUM ZIRCONIUM CYCLOSILICATE 10 GRAM(S): 10 POWDER, FOR SUSPENSION ORAL at 12:19

## 2023-07-28 RX ADMIN — CLOPIDOGREL BISULFATE 75 MILLIGRAM(S): 75 TABLET, FILM COATED ORAL at 12:19

## 2023-07-28 NOTE — PHYSICAL THERAPY INITIAL EVALUATION ADULT - GENERAL OBSERVATIONS, REHAB EVAL
11:25-11:50 11:25-11:50 Chart reviewed. Pt is okay to be seen for skilled PT as per ALFRED Barakat, +Heplock/Primafit, in NAD. Pt is agreeable.

## 2023-07-28 NOTE — PHYSICAL THERAPY INITIAL EVALUATION ADULT - PERTINENT HX OF CURRENT PROBLEM, REHAB EVAL
A 89 y/o F with pmhx of bedbound,  HTN, CVA without residual deficits, dementia, A-fib status post Watchman, ventral hernia,  admission in May for right tib-fib fracture   brought in for multiple emesis this morning after eating breakfast.   Pt poor historian due to dementia, per daughter at bedside patient vomited today few times NBNB after eating breakfast.  PT has denies any abdominal pain. Pt had and episode of loose stool, non bloody today. PT found to be hypotensive at home with BP 72/73. Pt denies dizziness or LOC.  PT denies chest pain or shortness of breath. Pt with hx of uti , last on May, Pt with incontinence, per daughter has been making good urine , no hematuria. PT denies fever, chills.

## 2023-07-28 NOTE — PROGRESS NOTE ADULT - SUBJECTIVE AND OBJECTIVE BOX
SUBJECTIVE:    Patient is a 89y old Female who presents with a chief complaint of NINA, UTI, Nausea, vomiting, dehydration (27 Jul 2023 18:49)    Currently admitted to medicine with the primary diagnosis of Acute UTI       Today is hospital day 1d. This morning she is resting comfortably in bed and reports no new issues or overnight events.     ROS:   CONSTITUTIONAL: No weakness, fevers or chills   EYES/ENT: No visual changes; No vertigo or throat pain   NECK: No pain or stiffness   RESPIRATORY: No cough, wheezing, hemoptysis; No shortness of breath   CARDIOVASCULAR: No chest pain or palpitations   GASTROINTESTINAL: No abdominal or epigastric pain. No nausea, vomiting, or hematemesis; No diarrhea or constipation. No melena or hematochezia.  GENITOURINARY: No dysuria, frequency or hematuria  NEUROLOGICAL: No numbness or weakness        PAST MEDICAL & SURGICAL HISTORY  HTN (hypertension)    CVA (cerebrovascular accident)    Dementia    Chronic atrial fibrillation    History of macular degeneration    Arthritis    Presence of Watchman left atrial appendage closure device    Fracture, tibia and fibula      SOCIAL HISTORY:    ALLERGIES:  penicillin (Unknown)  aspirin (Unknown)    MEDICATIONS:  STANDING MEDICATIONS  aMIOdarone    Tablet 100 milliGRAM(s) Oral daily  cefTRIAXone   IVPB 1000 milliGRAM(s) IV Intermittent every 24 hours  chlorhexidine 2% Cloths 1 Application(s) Topical <User Schedule>  citalopram 10 milliGRAM(s) Oral at bedtime  clopidogrel Tablet 75 milliGRAM(s) Oral daily  cyanocobalamin 1000 MICROGram(s) Oral daily  folic acid 1 milliGRAM(s) Oral at bedtime  heparin   Injectable 5000 Unit(s) SubCutaneous every 12 hours  pantoprazole    Tablet 40 milliGRAM(s) Oral before breakfast  sodium chloride 0.9%. 1000 milliLiter(s) IV Continuous <Continuous>  sodium zirconium cyclosilicate 10 Gram(s) Oral once    PRN MEDICATIONS  acetaminophen     Tablet .. 650 milliGRAM(s) Oral every 6 hours PRN  aluminum hydroxide/magnesium hydroxide/simethicone Suspension 30 milliLiter(s) Oral every 4 hours PRN  melatonin 3 milliGRAM(s) Oral at bedtime PRN  ondansetron    Tablet 4 milliGRAM(s) Oral every 8 hours PRN    VITALS:   T(F): 98.4  HR: 75  BP: 104/54  RR: 20  SpO2: 97%    LABS:  Negative for smoking/alcohol/drug use.                         11.0   7.84  )-----------( 304      ( 27 Jul 2023 16:49 )             33.8     07-28    140  |  101  |  47<H>  ----------------------------<  89  5.4<H>   |  25  |  2.6<H>    Ca    9.5      28 Jul 2023 07:00  Mg     2.2     07-28    TPro  6.8  /  Alb  4.0  /  TBili  0.4  /  DBili  x   /  AST  10  /  ALT  7   /  AlkPhos  106  07-28      Urinalysis Basic - ( 28 Jul 2023 07:00 )    Color: x / Appearance: x / SG: x / pH: x  Gluc: 89 mg/dL / Ketone: x  / Bili: x / Urobili: x   Blood: x / Protein: x / Nitrite: x   Leuk Esterase: x / RBC: x / WBC x   Sq Epi: x / Non Sq Epi: x / Bacteria: x        Creatine Kinase, Serum: 58 U/L (07-28-23 @ 07:00)  Troponin T, Serum: 0.01 ng/mL (07-28-23 @ 07:00)  Creatine Kinase, Serum: 67 U/L (07-27-23 @ 21:46)  Troponin T, Serum: <0.01 ng/mL (07-27-23 @ 21:46)  Troponin T, Serum: 0.02 ng/mL *H* (07-27-23 @ 14:39)      CARDIAC MARKERS ( 28 Jul 2023 07:00 )  x     / 0.01 ng/mL / 58 U/L / x     / 2.0 ng/mL  CARDIAC MARKERS ( 27 Jul 2023 21:46 )  x     / <0.01 ng/mL / 67 U/L / x     / 1.7 ng/mL  CARDIAC MARKERS ( 27 Jul 2023 14:39 )  x     / 0.02 ng/mL / x     / x     / x          RADIOLOGY:    PHYSICAL EXAM:  GEN: No acute distress  HEENT: normocephalic, atraumatic, aniceteric  LUNGS: bl breath sounds   HEART: S1/S2 present. RRR, no murmurs  ABD: Soft, non-tender, non-distended. Bowel sounds present  EXT: warm   NEURO: [email or web address]      ASSESSMENT AND PLAN:    89 y/o F with pmhx of bedbound,  HTN, CVA without residual deficits, dementia, A-fib status post Watchman, ventral hernia,  admission in May for right tib-fib fracture   brought in for multiple emesis this morning after eating breakfast.      #UTI  #hx urinary incontinence s/p straight cath   -IV abx Rocephin 1 g q 24 hrs  -follow up Ucx  -monitor for fever     #NINA   s/p 500 cc bolus   -trend cr level   -gentle IV hydration 50 cc x 12 hours   -monitor I and O   -avoid nephrotoxic meds    #hyperkalemia   -s/p LOKELMA   -repeat BMP at 10 pm   -monitor K level     #elevated troponin likely due to NINA   EKG NS bradycardia with 1st degree A-V block  Incomplete left bundle branch block  -trend cardiac enzymes  -cardiac monitor   -repeat EKG in am     #One episode of NBNB vomiting at home - resolved   - CT a/p Ventral hernia measuring 8.5 cm containing short segment of   nonobstructed transverse colon  -abdomen soft , hernia reducible  -zofran prn , monitor QTc noted 422 on EKG   -PPI       #hx HTN   -patient hypotensive at home BP 73/33 , now resolved   -will hold BP meds given well controlled bp   -resume bp meds as needed     #hx  CVA without residual deficits  -cw Plavix       #hx A-fib status post Watchman  cw amiodarone with holding parameters     DVT prophylaxis   GI prophylaxis    SUBJECTIVE:    Patient is a 89y old Female who presents with a chief complaint of NINA, UTI, Nausea, vomiting, dehydration (27 Jul 2023 18:49)    Currently admitted to medicine with the primary diagnosis of Acute UTI       Today is hospital day 1d. This morning she is resting comfortably in bed and reports no new issues or overnight events.     ROS:   CONSTITUTIONAL: No weakness, fevers or chills   EYES/ENT: No visual changes; No vertigo or throat pain   NECK: No pain or stiffness   RESPIRATORY: No cough, wheezing, hemoptysis; No shortness of breath   CARDIOVASCULAR: No chest pain or palpitations   GASTROINTESTINAL: No abdominal or epigastric pain. No nausea, vomiting, or hematemesis; No diarrhea or constipation. No melena or hematochezia.  GENITOURINARY: No dysuria, frequency or hematuria  NEUROLOGICAL: No numbness or weakness        PAST MEDICAL & SURGICAL HISTORY  HTN (hypertension)    CVA (cerebrovascular accident)    Dementia    Chronic atrial fibrillation    History of macular degeneration    Arthritis    Presence of Watchman left atrial appendage closure device    Fracture, tibia and fibula      SOCIAL HISTORY:    ALLERGIES:  penicillin (Unknown)  aspirin (Unknown)    MEDICATIONS:  STANDING MEDICATIONS  aMIOdarone    Tablet 100 milliGRAM(s) Oral daily  cefTRIAXone   IVPB 1000 milliGRAM(s) IV Intermittent every 24 hours  chlorhexidine 2% Cloths 1 Application(s) Topical <User Schedule>  citalopram 10 milliGRAM(s) Oral at bedtime  clopidogrel Tablet 75 milliGRAM(s) Oral daily  cyanocobalamin 1000 MICROGram(s) Oral daily  folic acid 1 milliGRAM(s) Oral at bedtime  heparin   Injectable 5000 Unit(s) SubCutaneous every 12 hours  pantoprazole    Tablet 40 milliGRAM(s) Oral before breakfast  sodium chloride 0.9%. 1000 milliLiter(s) IV Continuous <Continuous>  sodium zirconium cyclosilicate 10 Gram(s) Oral once    PRN MEDICATIONS  acetaminophen     Tablet .. 650 milliGRAM(s) Oral every 6 hours PRN  aluminum hydroxide/magnesium hydroxide/simethicone Suspension 30 milliLiter(s) Oral every 4 hours PRN  melatonin 3 milliGRAM(s) Oral at bedtime PRN  ondansetron    Tablet 4 milliGRAM(s) Oral every 8 hours PRN    VITALS:   T(F): 98.4  HR: 75  BP: 104/54  RR: 20  SpO2: 97%    LABS:  Negative for smoking/alcohol/drug use.                         11.0   7.84  )-----------( 304      ( 27 Jul 2023 16:49 )             33.8     07-28    140  |  101  |  47<H>  ----------------------------<  89  5.4<H>   |  25  |  2.6<H>    Ca    9.5      28 Jul 2023 07:00  Mg     2.2     07-28    TPro  6.8  /  Alb  4.0  /  TBili  0.4  /  DBili  x   /  AST  10  /  ALT  7   /  AlkPhos  106  07-28      Urinalysis Basic - ( 28 Jul 2023 07:00 )    Color: x / Appearance: x / SG: x / pH: x  Gluc: 89 mg/dL / Ketone: x  / Bili: x / Urobili: x   Blood: x / Protein: x / Nitrite: x   Leuk Esterase: x / RBC: x / WBC x   Sq Epi: x / Non Sq Epi: x / Bacteria: x        Creatine Kinase, Serum: 58 U/L (07-28-23 @ 07:00)  Troponin T, Serum: 0.01 ng/mL (07-28-23 @ 07:00)  Creatine Kinase, Serum: 67 U/L (07-27-23 @ 21:46)  Troponin T, Serum: <0.01 ng/mL (07-27-23 @ 21:46)  Troponin T, Serum: 0.02 ng/mL *H* (07-27-23 @ 14:39)      CARDIAC MARKERS ( 28 Jul 2023 07:00 )  x     / 0.01 ng/mL / 58 U/L / x     / 2.0 ng/mL  CARDIAC MARKERS ( 27 Jul 2023 21:46 )  x     / <0.01 ng/mL / 67 U/L / x     / 1.7 ng/mL  CARDIAC MARKERS ( 27 Jul 2023 14:39 )  x     / 0.02 ng/mL / x     / x     / x          RADIOLOGY:    PHYSICAL EXAM:  GEN: No acute distress  HEENT: normocephalic, atraumatic, aniceteric  LUNGS: bl breath sounds   HEART: S1/S2 present. RRR, no murmurs  ABD: Soft, non-tender, non-distended. Bowel sounds present  EXT: warm   NEURO: [email or web address]      ASSESSMENT AND PLAN:    89 y/o F with pmhx of bedbound,  HTN, CVA without residual deficits, dementia, A-fib status post Watchman, ventral hernia,  admission in May for right tib-fib fracture   brought in for multiple emesis this morning after eating breakfast.      #UTI  #H/O urinary incontinence s/p straight cath in ED  -no sepsis present on admission   -patient denies symptoms of burning/ pain on urination, however given dementia will treat   -Rocephin 1 g q 24 hrs  -follow up Ucx  -monitor for fever , if febrile send blood clx    #NINA vs ? CKD4  #Hyperkalemia  - unknown Cr baseline , daughter reported patient has chronic renal failure   - CT AP no hydro  - RBUS - non obstructing stone   - s/p 500 cc bolus in ED  and sp gentle IV hydration 50 cc x 12 hours   - monitor I and O   - lokelma, monitor bmp  -avoid nephrotoxic meds, low potassium diet     #Elevated troponin likely due to NINA - resolved   - EKG NS bradycardia with 1st degree A-V block  - Incomplete left bundle branch block  - trop 0.02-> 0.01-> 0.01  - repeat EKG   - TTE    #NBNB at home - resolved   - CT a/p Ventral hernia measuring 8.5 cm containing short segment of   nonobstructed transverse colon  -abdomen soft , hernia reducible  -zofran prn , monitor QTc noted 422 on EKG   -PPI     #H/O HTN   -patient hypotensive at home BP 73/33 , now resolved   -will hold BP meds given well controlled bp   -resume bp meds as needed     # H/O CVA -cw Plavix (ALLERGIC TO ASA) , not on a statin at home, obtain lipid profile     #H/O chronic A-fib status post Watchman- cw amiodarone with holding parameters , not on AC per daughter     # H/O Decreased mobility  - patient baseline bed to chair , PT EVAL     # H/O Sacral Ulcer , stage 1 , wound eval     # dvt/gi ppx/diet  # Dispo: acute  # handoff: fu urine clx / nephro eval  / resolution of hyperkalemia  - possible dc in 48 hrs if continues to improve / clx come back/ renal function is stable     DNR/DNI    SUBJECTIVE:    Patient is a 89y old Female who presents with a chief complaint of NINA, UTI, Nausea, vomiting, dehydration (27 Jul 2023 18:49)    Currently admitted to medicine with the primary diagnosis of Acute UTI       Today is hospital day 1d. This morning she is resting comfortably in bed and reports no new issues or overnight events.     ROS:   CONSTITUTIONAL: No weakness, fevers or chills   EYES/ENT: No visual changes; No vertigo or throat pain   NECK: No pain or stiffness   RESPIRATORY: No cough, wheezing, hemoptysis; No shortness of breath   CARDIOVASCULAR: No chest pain or palpitations   GASTROINTESTINAL: No abdominal or epigastric pain. No nausea, vomiting, or hematemesis; No diarrhea or constipation. No melena or hematochezia.  GENITOURINARY: No dysuria, frequency or hematuria  NEUROLOGICAL: No numbness or weakness        PAST MEDICAL & SURGICAL HISTORY  HTN (hypertension)    CVA (cerebrovascular accident)    Dementia    Chronic atrial fibrillation    History of macular degeneration    Arthritis    Presence of Watchman left atrial appendage closure device    Fracture, tibia and fibula      SOCIAL HISTORY:    ALLERGIES:  penicillin (Unknown)  aspirin (Unknown)    MEDICATIONS:  STANDING MEDICATIONS  aMIOdarone    Tablet 100 milliGRAM(s) Oral daily  cefTRIAXone   IVPB 1000 milliGRAM(s) IV Intermittent every 24 hours  chlorhexidine 2% Cloths 1 Application(s) Topical <User Schedule>  citalopram 10 milliGRAM(s) Oral at bedtime  clopidogrel Tablet 75 milliGRAM(s) Oral daily  cyanocobalamin 1000 MICROGram(s) Oral daily  folic acid 1 milliGRAM(s) Oral at bedtime  heparin   Injectable 5000 Unit(s) SubCutaneous every 12 hours  pantoprazole    Tablet 40 milliGRAM(s) Oral before breakfast  sodium chloride 0.9%. 1000 milliLiter(s) IV Continuous <Continuous>  sodium zirconium cyclosilicate 10 Gram(s) Oral once    PRN MEDICATIONS  acetaminophen     Tablet .. 650 milliGRAM(s) Oral every 6 hours PRN  aluminum hydroxide/magnesium hydroxide/simethicone Suspension 30 milliLiter(s) Oral every 4 hours PRN  melatonin 3 milliGRAM(s) Oral at bedtime PRN  ondansetron    Tablet 4 milliGRAM(s) Oral every 8 hours PRN    VITALS:   T(F): 98.4  HR: 75  BP: 104/54  RR: 20  SpO2: 97%    LABS:  Negative for smoking/alcohol/drug use.                         11.0   7.84  )-----------( 304      ( 27 Jul 2023 16:49 )             33.8     07-28    140  |  101  |  47<H>  ----------------------------<  89  5.4<H>   |  25  |  2.6<H>    Ca    9.5      28 Jul 2023 07:00  Mg     2.2     07-28    TPro  6.8  /  Alb  4.0  /  TBili  0.4  /  DBili  x   /  AST  10  /  ALT  7   /  AlkPhos  106  07-28      Urinalysis Basic - ( 28 Jul 2023 07:00 )    Color: x / Appearance: x / SG: x / pH: x  Gluc: 89 mg/dL / Ketone: x  / Bili: x / Urobili: x   Blood: x / Protein: x / Nitrite: x   Leuk Esterase: x / RBC: x / WBC x   Sq Epi: x / Non Sq Epi: x / Bacteria: x        Creatine Kinase, Serum: 58 U/L (07-28-23 @ 07:00)  Troponin T, Serum: 0.01 ng/mL (07-28-23 @ 07:00)  Creatine Kinase, Serum: 67 U/L (07-27-23 @ 21:46)  Troponin T, Serum: <0.01 ng/mL (07-27-23 @ 21:46)  Troponin T, Serum: 0.02 ng/mL *H* (07-27-23 @ 14:39)      CARDIAC MARKERS ( 28 Jul 2023 07:00 )  x     / 0.01 ng/mL / 58 U/L / x     / 2.0 ng/mL  CARDIAC MARKERS ( 27 Jul 2023 21:46 )  x     / <0.01 ng/mL / 67 U/L / x     / 1.7 ng/mL  CARDIAC MARKERS ( 27 Jul 2023 14:39 )  x     / 0.02 ng/mL / x     / x     / x          RADIOLOGY:    PHYSICAL EXAM:  GEN: No acute distress  HEENT: normocephalic, atraumatic, aniceteric  LUNGS: bl breath sounds   HEART: S1/S2 present. RRR, no murmurs  ABD: Soft, non-tender, non-distended. Bowel sounds present  EXT: warm   NEURO: [email or web address]      ASSESSMENT AND PLAN:    89 y/o F with pmhx of bedbound,  HTN, CVA without residual deficits, dementia, A-fib status post Watchman, ventral hernia,  admission in May for right tib-fib fracture   brought in for multiple emesis this morning after eating breakfast.      #UTI  #H/O urinary incontinence s/p straight cath in ED  -no sepsis present on admission   -patient denies symptoms of burning/ pain on urination, however given dementia will treat   -Rocephin 1 g q 24 hrs  -follow up Ucx  -monitor for fever , if febrile send blood clx    #NINA vs ? CKD4  #Hyperkalemia  - unknown Cr baseline , daughter reported patient has chronic renal failure   - CT AP no hydro  - RBUS - non obstructing stone   - s/p 500 cc bolus in ED  and sp gentle IV hydration 50 cc x 12 hours   - monitor I and O   - lokelma, monitor bmp  -avoid nephrotoxic meds, low potassium diet     #Elevated troponin likely due to NINA - resolved   - EKG NS bradycardia with 1st degree A-V block  - Incomplete left bundle branch block  - trop 0.02-> 0.01-> 0.01  - repeat EKG noted w 1st degree AV block   - TTE    #NBNB at home - resolved   - CT a/p Ventral hernia measuring 8.5 cm containing short segment of   nonobstructed transverse colon  -abdomen soft , hernia reducible  -zofran prn , monitor QTc noted 422 on EKG   -PPI     #H/O HTN   -patient hypotensive at home BP 73/33 , now resolved   -will hold BP meds given well controlled bp   -resume bp meds as needed     # H/O CVA -cw Plavix (ALLERGIC TO ASA) , not on a statin at home, obtain lipid profile     #H/O chronic A-fib status post Watchman- cw amiodarone with holding parameters , not on AC per daughter     # H/O Decreased mobility  - patient baseline bed to chair , PT EVAL     # H/O Sacral Ulcer , stage 1 , wound eval     # dvt/gi ppx/diet  # Dispo: acute  # handoff: fu urine clx / nephro eval  / resolution of hyperkalemia / TTE  - possible dc in 48 hrs if continues to improve / clx come back/ renal function is stable - daughter prefers the patient to go home when improved   # family discussion: attempted to contact daughter today , unable to reach 7/28/23 - will reattempt at later time for an update , i spoke to her in detail on 7/27 pm about the plan of care     DNR/DNI    SUBJECTIVE:    Patient is a 89y old Female who presents with a chief complaint of NINA, UTI, Nausea, vomiting, dehydration (27 Jul 2023 18:49)    Currently admitted to medicine with the primary diagnosis of Acute UTI       Today is hospital day 1d. This morning she is resting comfortably in bed and reports no new issues or overnight events.     ROS:   CONSTITUTIONAL: No weakness, fevers or chills   EYES/ENT: No visual changes; No vertigo or throat pain   NECK: No pain or stiffness   RESPIRATORY: No cough, wheezing, hemoptysis; No shortness of breath   CARDIOVASCULAR: No chest pain or palpitations   GASTROINTESTINAL: No abdominal or epigastric pain. No nausea, vomiting, or hematemesis; No diarrhea or constipation. No melena or hematochezia.  GENITOURINARY: No dysuria, frequency or hematuria  NEUROLOGICAL: No numbness or weakness        PAST MEDICAL & SURGICAL HISTORY  HTN (hypertension)    CVA (cerebrovascular accident)    Dementia    Chronic atrial fibrillation    History of macular degeneration    Arthritis    Presence of Watchman left atrial appendage closure device    Fracture, tibia and fibula      SOCIAL HISTORY:    ALLERGIES:  penicillin (Unknown)  aspirin (Unknown)    MEDICATIONS:  STANDING MEDICATIONS  aMIOdarone    Tablet 100 milliGRAM(s) Oral daily  cefTRIAXone   IVPB 1000 milliGRAM(s) IV Intermittent every 24 hours  chlorhexidine 2% Cloths 1 Application(s) Topical <User Schedule>  citalopram 10 milliGRAM(s) Oral at bedtime  clopidogrel Tablet 75 milliGRAM(s) Oral daily  cyanocobalamin 1000 MICROGram(s) Oral daily  folic acid 1 milliGRAM(s) Oral at bedtime  heparin   Injectable 5000 Unit(s) SubCutaneous every 12 hours  pantoprazole    Tablet 40 milliGRAM(s) Oral before breakfast  sodium chloride 0.9%. 1000 milliLiter(s) IV Continuous <Continuous>  sodium zirconium cyclosilicate 10 Gram(s) Oral once    PRN MEDICATIONS  acetaminophen     Tablet .. 650 milliGRAM(s) Oral every 6 hours PRN  aluminum hydroxide/magnesium hydroxide/simethicone Suspension 30 milliLiter(s) Oral every 4 hours PRN  melatonin 3 milliGRAM(s) Oral at bedtime PRN  ondansetron    Tablet 4 milliGRAM(s) Oral every 8 hours PRN    VITALS:   T(F): 98.4  HR: 75  BP: 104/54  RR: 20  SpO2: 97%    LABS:  Negative for smoking/alcohol/drug use.                         11.0   7.84  )-----------( 304      ( 27 Jul 2023 16:49 )             33.8     07-28    140  |  101  |  47<H>  ----------------------------<  89  5.4<H>   |  25  |  2.6<H>    Ca    9.5      28 Jul 2023 07:00  Mg     2.2     07-28    TPro  6.8  /  Alb  4.0  /  TBili  0.4  /  DBili  x   /  AST  10  /  ALT  7   /  AlkPhos  106  07-28      Urinalysis Basic - ( 28 Jul 2023 07:00 )    Color: x / Appearance: x / SG: x / pH: x  Gluc: 89 mg/dL / Ketone: x  / Bili: x / Urobili: x   Blood: x / Protein: x / Nitrite: x   Leuk Esterase: x / RBC: x / WBC x   Sq Epi: x / Non Sq Epi: x / Bacteria: x        Creatine Kinase, Serum: 58 U/L (07-28-23 @ 07:00)  Troponin T, Serum: 0.01 ng/mL (07-28-23 @ 07:00)  Creatine Kinase, Serum: 67 U/L (07-27-23 @ 21:46)  Troponin T, Serum: <0.01 ng/mL (07-27-23 @ 21:46)  Troponin T, Serum: 0.02 ng/mL *H* (07-27-23 @ 14:39)      CARDIAC MARKERS ( 28 Jul 2023 07:00 )  x     / 0.01 ng/mL / 58 U/L / x     / 2.0 ng/mL  CARDIAC MARKERS ( 27 Jul 2023 21:46 )  x     / <0.01 ng/mL / 67 U/L / x     / 1.7 ng/mL  CARDIAC MARKERS ( 27 Jul 2023 14:39 )  x     / 0.02 ng/mL / x     / x     / x          RADIOLOGY:    PHYSICAL EXAM:  GEN: No acute distress  HEENT: normocephalic, atraumatic, aniceteric  LUNGS: bl breath sounds   HEART: S1/S2 present. RRR, no murmurs  ABD: Soft, non-tender, non-distended. Bowel sounds present  EXT: warm   NEURO: [email or web address]      ASSESSMENT AND PLAN:    89 y/o F with pmhx of bedbound,  HTN, CVA without residual deficits, dementia, A-fib status post Watchman, ventral hernia,  admission in May for right tib-fib fracture   brought in for multiple emesis this morning after eating breakfast.      #UTI  #H/O urinary incontinence s/p straight cath in ED  -no sepsis present on admission   -patient denies symptoms of burning/ pain on urination, however given dementia will treat   -Rocephin 1 g q 24 hrs  -follow up Ucx  -monitor for fever , if febrile send blood clx    #NINA vs ? CKD4  #Hyperkalemia  - unknown Cr baseline , daughter reported patient has chronic renal failure   - CT AP no hydro  - RBUS - non obstructing stone   - s/p 500 cc bolus in ED  and sp gentle IV hydration 50 cc x 12 hours   - monitor I and O   - lokelma, monitor bmp  -avoid nephrotoxic meds, low potassium diet     #Elevated troponin likely due to NINA - resolved   - EKG NS bradycardia with 1st degree A-V block  - Incomplete left bundle branch block  - trop 0.02-> 0.01-> 0.01  - repeat EKG noted w 1st degree AV block   - TTE    #NBNB at home - resolved   - CT a/p Ventral hernia measuring 8.5 cm containing short segment of   nonobstructed transverse colon  -abdomen soft , hernia reducible  -zofran prn , monitor QTc noted 422 on EKG   -PPI     #H/O HTN   -patient hypotensive at home BP 73/33 , now resolved   -will hold BP meds given well controlled bp   -resume bp meds as needed     # H/O CVA -cw Plavix (ALLERGIC TO ASA) , not on a statin at home, obtain lipid profile     #H/O chronic A-fib status post Watchman- cw amiodarone with holding parameters , not on AC per daughter     # H/O Decreased mobility  - patient baseline bed to chair , PT EVAL     # H/O Sacral Ulcer , stage 1 , wound eval , pressure offloadig, routine nursing care     # dvt/gi ppx/diet  # Dispo: acute  # handoff: fu urine clx / nephro eval  / resolution of hyperkalemia / TTE  - possible dc in 48 hrs if continues to improve / clx come back/ renal function is stable - daughter prefers the patient to go home when improved   # family discussion: attempted to contact daughter today , unable to reach 7/28/23 - will reattempt at later time for an update , i spoke to her in detail on 7/27 pm about the plan of care     DNR/DNI

## 2023-07-28 NOTE — PHYSICAL THERAPY INITIAL EVALUATION ADULT - ADDITIONAL COMMENTS
pt is a 89 y.o. F. pt is poor historian, unable to obtain information at this time. Attempted to call family member to obtain information, but family did not  call. Will obtain information at a later time when family available pt is a 89 y.o. F, who lives alone in mother-daughter [place]. Daughter lives in other part of the . Pt has been bed bound/wc bound. Family/HHA uses dion lift to transfer pt. Pt is dependent with bed mobility, transfers, and all ADLs. HHA is present 24/7. pt is a 89 y.o. F, who lives alone in mother-daughter [place]. Daughter lives in other part of the . Pt has been bed bound/wc bound. Family/HHA uses dion lift to transfer pt. Pt is dependent with bed mobility, transfers, and all ADLs. HHA is present 24/7. Information obtained by the pt's daughter

## 2023-07-29 LAB
ALBUMIN SERPL ELPH-MCNC: 3.7 G/DL — SIGNIFICANT CHANGE UP (ref 3.5–5.2)
ALP SERPL-CCNC: 96 U/L — SIGNIFICANT CHANGE UP (ref 30–115)
ALT FLD-CCNC: 6 U/L — SIGNIFICANT CHANGE UP (ref 0–41)
ANION GAP SERPL CALC-SCNC: 10 MMOL/L — SIGNIFICANT CHANGE UP (ref 7–14)
AST SERPL-CCNC: 10 U/L — SIGNIFICANT CHANGE UP (ref 0–41)
BASOPHILS # BLD AUTO: 0.04 K/UL — SIGNIFICANT CHANGE UP (ref 0–0.2)
BASOPHILS NFR BLD AUTO: 0.7 % — SIGNIFICANT CHANGE UP (ref 0–1)
BILIRUB SERPL-MCNC: 0.3 MG/DL — SIGNIFICANT CHANGE UP (ref 0.2–1.2)
BUN SERPL-MCNC: 45 MG/DL — HIGH (ref 10–20)
CALCIUM SERPL-MCNC: 9 MG/DL — SIGNIFICANT CHANGE UP (ref 8.4–10.5)
CHLORIDE SERPL-SCNC: 102 MMOL/L — SIGNIFICANT CHANGE UP (ref 98–110)
CHOLEST SERPL-MCNC: 142 MG/DL — SIGNIFICANT CHANGE UP
CO2 SERPL-SCNC: 24 MMOL/L — SIGNIFICANT CHANGE UP (ref 17–32)
CREAT SERPL-MCNC: 2.3 MG/DL — HIGH (ref 0.7–1.5)
EGFR: 19 ML/MIN/1.73M2 — LOW
EOSINOPHIL # BLD AUTO: 0.47 K/UL — SIGNIFICANT CHANGE UP (ref 0–0.7)
EOSINOPHIL NFR BLD AUTO: 8.2 % — HIGH (ref 0–8)
GLUCOSE SERPL-MCNC: 85 MG/DL — SIGNIFICANT CHANGE UP (ref 70–99)
HCT VFR BLD CALC: 30.7 % — LOW (ref 37–47)
HDLC SERPL-MCNC: 38 MG/DL — LOW
HGB BLD-MCNC: 9.8 G/DL — LOW (ref 12–16)
IMM GRANULOCYTES NFR BLD AUTO: 0.2 % — SIGNIFICANT CHANGE UP (ref 0.1–0.3)
LIPID PNL WITH DIRECT LDL SERPL: 80 MG/DL — SIGNIFICANT CHANGE UP
LYMPHOCYTES # BLD AUTO: 1.43 K/UL — SIGNIFICANT CHANGE UP (ref 1.2–3.4)
LYMPHOCYTES # BLD AUTO: 24.9 % — SIGNIFICANT CHANGE UP (ref 20.5–51.1)
MCHC RBC-ENTMCNC: 29.1 PG — SIGNIFICANT CHANGE UP (ref 27–31)
MCHC RBC-ENTMCNC: 31.9 G/DL — LOW (ref 32–37)
MCV RBC AUTO: 91.1 FL — SIGNIFICANT CHANGE UP (ref 81–99)
MONOCYTES # BLD AUTO: 0.5 K/UL — SIGNIFICANT CHANGE UP (ref 0.1–0.6)
MONOCYTES NFR BLD AUTO: 8.7 % — SIGNIFICANT CHANGE UP (ref 1.7–9.3)
NEUTROPHILS # BLD AUTO: 3.29 K/UL — SIGNIFICANT CHANGE UP (ref 1.4–6.5)
NEUTROPHILS NFR BLD AUTO: 57.3 % — SIGNIFICANT CHANGE UP (ref 42.2–75.2)
NON HDL CHOLESTEROL: 104 MG/DL — SIGNIFICANT CHANGE UP
NRBC # BLD: 0 /100 WBCS — SIGNIFICANT CHANGE UP (ref 0–0)
PLATELET # BLD AUTO: 278 K/UL — SIGNIFICANT CHANGE UP (ref 130–400)
PMV BLD: 9.7 FL — SIGNIFICANT CHANGE UP (ref 7.4–10.4)
POTASSIUM SERPL-MCNC: 5.2 MMOL/L — HIGH (ref 3.5–5)
POTASSIUM SERPL-SCNC: 5.2 MMOL/L — HIGH (ref 3.5–5)
PROT SERPL-MCNC: 6.2 G/DL — SIGNIFICANT CHANGE UP (ref 6–8)
RBC # BLD: 3.37 M/UL — LOW (ref 4.2–5.4)
RBC # FLD: 13.1 % — SIGNIFICANT CHANGE UP (ref 11.5–14.5)
SODIUM SERPL-SCNC: 136 MMOL/L — SIGNIFICANT CHANGE UP (ref 135–146)
TRIGL SERPL-MCNC: 120 MG/DL — SIGNIFICANT CHANGE UP
WBC # BLD: 5.74 K/UL — SIGNIFICANT CHANGE UP (ref 4.8–10.8)
WBC # FLD AUTO: 5.74 K/UL — SIGNIFICANT CHANGE UP (ref 4.8–10.8)

## 2023-07-29 PROCEDURE — 99222 1ST HOSP IP/OBS MODERATE 55: CPT

## 2023-07-29 PROCEDURE — 99232 SBSQ HOSP IP/OBS MODERATE 35: CPT

## 2023-07-29 PROCEDURE — 36569 INSJ PICC 5 YR+ W/O IMAGING: CPT

## 2023-07-29 PROCEDURE — 76937 US GUIDE VASCULAR ACCESS: CPT | Mod: 26,59

## 2023-07-29 RX ADMIN — CITALOPRAM 10 MILLIGRAM(S): 10 TABLET, FILM COATED ORAL at 21:44

## 2023-07-29 RX ADMIN — HEPARIN SODIUM 5000 UNIT(S): 5000 INJECTION INTRAVENOUS; SUBCUTANEOUS at 17:41

## 2023-07-29 RX ADMIN — AMIODARONE HYDROCHLORIDE 100 MILLIGRAM(S): 400 TABLET ORAL at 06:21

## 2023-07-29 RX ADMIN — Medication 650 MILLIGRAM(S): at 18:23

## 2023-07-29 RX ADMIN — CEFTRIAXONE 100 MILLIGRAM(S): 500 INJECTION, POWDER, FOR SOLUTION INTRAMUSCULAR; INTRAVENOUS at 06:21

## 2023-07-29 RX ADMIN — Medication 650 MILLIGRAM(S): at 17:53

## 2023-07-29 RX ADMIN — Medication 1 MILLIGRAM(S): at 21:44

## 2023-07-29 RX ADMIN — SENNA PLUS 2 TABLET(S): 8.6 TABLET ORAL at 21:44

## 2023-07-29 RX ADMIN — HEPARIN SODIUM 5000 UNIT(S): 5000 INJECTION INTRAVENOUS; SUBCUTANEOUS at 06:20

## 2023-07-29 RX ADMIN — CLOPIDOGREL BISULFATE 75 MILLIGRAM(S): 75 TABLET, FILM COATED ORAL at 17:53

## 2023-07-29 RX ADMIN — PREGABALIN 1000 MICROGRAM(S): 225 CAPSULE ORAL at 17:53

## 2023-07-29 NOTE — DIETITIAN INITIAL EVALUATION ADULT - PERTINENT MEDS FT
MEDICATIONS  (STANDING):  aMIOdarone    Tablet 100 milliGRAM(s) Oral daily  cefTRIAXone   IVPB 1000 milliGRAM(s) IV Intermittent every 24 hours  chlorhexidine 2% Cloths 1 Application(s) Topical <User Schedule>  citalopram 10 milliGRAM(s) Oral at bedtime  clopidogrel Tablet 75 milliGRAM(s) Oral daily  cyanocobalamin 1000 MICROGram(s) Oral daily  folic acid 1 milliGRAM(s) Oral at bedtime  heparin   Injectable 5000 Unit(s) SubCutaneous every 12 hours  pantoprazole    Tablet 40 milliGRAM(s) Oral before breakfast  polyethylene glycol 3350 17 Gram(s) Oral daily  senna 2 Tablet(s) Oral at bedtime    MEDICATIONS  (PRN):  acetaminophen     Tablet .. 650 milliGRAM(s) Oral every 6 hours PRN Temp greater or equal to 38C (100.4F), Mild Pain (1 - 3)  aluminum hydroxide/magnesium hydroxide/simethicone Suspension 30 milliLiter(s) Oral every 4 hours PRN Dyspepsia  melatonin 3 milliGRAM(s) Oral at bedtime PRN Insomnia  ondansetron    Tablet 4 milliGRAM(s) Oral every 8 hours PRN Nausea and/or Vomiting

## 2023-07-29 NOTE — CONSULT NOTE ADULT - ATTENDING COMMENTS
Agree with above note  Follow up as o/p
patient has soft abdomen, no tenderness of rigidity, reducible hernia    will continue her medical management , no need for surgical intervention at this point

## 2023-07-29 NOTE — DIETITIAN INITIAL EVALUATION ADULT - PERTINENT LABORATORY DATA
07-29    136  |  102  |  45<H>  ----------------------------<  85  5.2<H>   |  24  |  2.3<H>    Ca    9.0      29 Jul 2023 08:00  Mg     2.2     07-28    TPro  6.2  /  Alb  3.7  /  TBili  0.3  /  DBili  x   /  AST  10  /  ALT  6   /  AlkPhos  96  07-29  POCT Blood Glucose.: 117 mg/dL (07-28-23 @ 21:38)                          9.8    5.74  )-----------( 278      ( 29 Jul 2023 08:00 )             30.7

## 2023-07-29 NOTE — CONSULT NOTE ADULT - REASON FOR ADMISSION
NINA, UTI, Nausea, vomiting, dehydration

## 2023-07-29 NOTE — DIETITIAN INITIAL EVALUATION ADULT - NSICDXPASTMEDICALHX_GEN_ALL_CORE_FT
PAST MEDICAL HISTORY:  Arthritis     Chronic atrial fibrillation     CVA (cerebrovascular accident)     Dementia     History of macular degeneration     HTN (hypertension)

## 2023-07-29 NOTE — CONSULT NOTE ADULT - SUBJECTIVE AND OBJECTIVE BOX
NEPHROLOGY CONSULTATION NOTE    A 89 y/o F with pmhx of bedbound,  HTN, CVA without residual deficits, dementia, A-fib status post Watchman, ventral hernia,  admission in May for right tib-fib fracture   brought in for multiple emesis this morning after eating breakfast.   Pt poor historian due to dementia, per daughter at bedside patient vomited PTA few times NBNB after eating breakfast.  PT has denies any abdominal pain. Pt had and episode of loose stool, non bloody today. PT found to be hypotensive at home with BP 72/73. Pt denies dizziness or LOC.  PT denies chest pain or shortness of breath. Pt with hx of uti , last on May, Pt with incontinence, per daughter has been making good urine , no hematuria. PT denies fever, chills.     Renal was called for NINA vs CKD. Pt is NPO, awake, not answering any questions, no on IVF.    PAST MEDICAL & SURGICAL HISTORY:  HTN (hypertension)      CVA (cerebrovascular accident)      Dementia      Chronic atrial fibrillation      History of macular degeneration      Arthritis      Presence of Watchman left atrial appendage closure device      Fracture, tibia and fibula        Allergies:  penicillin (Unknown)  aspirin (Unknown)    Home Medications Reviewed  Hospital Medications:   MEDICATIONS  (STANDING):  aMIOdarone    Tablet 100 milliGRAM(s) Oral daily  cefTRIAXone   IVPB 1000 milliGRAM(s) IV Intermittent every 24 hours  chlorhexidine 2% Cloths 1 Application(s) Topical <User Schedule>  citalopram 10 milliGRAM(s) Oral at bedtime  clopidogrel Tablet 75 milliGRAM(s) Oral daily  cyanocobalamin 1000 MICROGram(s) Oral daily  folic acid 1 milliGRAM(s) Oral at bedtime  heparin   Injectable 5000 Unit(s) SubCutaneous every 12 hours  pantoprazole    Tablet 40 milliGRAM(s) Oral before breakfast  polyethylene glycol 3350 17 Gram(s) Oral daily  senna 2 Tablet(s) Oral at bedtime      SOCIAL HISTORY:  Denies ETOH,Smoking,   FAMILY HISTORY:    Yes        REVIEW OF SYSTEMS:  Unable to obtain    VITALS:  T(F): 97.7 (07-29-23 @ 05:22), Max: 97.7 (07-29-23 @ 05:22)  HR: 61 (07-29-23 @ 05:22)  BP: 165/66 (07-29-23 @ 05:22)  RR: 18 (07-29-23 @ 05:22)  SpO2: --    07-28 @ 07:01  -  07-29 @ 07:00  --------------------------------------------------------  IN: 0 mL / OUT: 475 mL / NET: -475 mL            I&O's Detail    28 Jul 2023 07:01  -  29 Jul 2023 07:00  --------------------------------------------------------  IN:  Total IN: 0 mL    OUT:    Voided (mL): 475 mL  Total OUT: 475 mL    Total NET: -475 mL            PHYSICAL EXAM:  Constitutional: NAD  HEENT: anicteric sclera  Neck: No JVD  Respiratory: CTA  Cardiovascular: S1, S2, RRR  Gastrointestinal: BS+, soft, NT/ND  Extremities: No peripheral edema  Neurological: Awake alert  Psychiatric: Normal mood, normal affect  : No CVA tenderness. No goodwin.   Skin: No rashes  Vascular Access:    LABS:  07-28    134<L>  |  100  |  46<H>  ----------------------------<  95  5.1<H>   |  23  |  2.6<H>    Ca    9.0      28 Jul 2023 19:48  Mg     2.2     07-28    TPro  6.8  /  Alb  4.0  /  TBili  0.4  /  DBili      /  AST  10  /  ALT  7   /  AlkPhos  106  07-28    Creatinine Trend: 2.6 <--, 2.6 <--, 2.8 <--, 2.6 <--, 2.7 <--, 2.8 <--                        11.0   7.84  )-----------( 304      ( 27 Jul 2023 16:49 )             33.8     Urine Studies:  Urinalysis Basic - ( 28 Jul 2023 19:48 )    Color:  / Appearance:  / SG:  / pH:   Gluc: 95 mg/dL / Ketone:   / Bili:  / Urobili:    Blood:  / Protein:  / Nitrite:    Leuk Esterase:  / RBC:  / WBC    Sq Epi:  / Non Sq Epi:  / Bacteria:                 RADIOLOGY & ADDITIONAL STUDIES:    < from: US Kidney and Bladder (07.27.23 @ 22:27) >  Right kidney: 8.6 cm. no hydronephrosis. There is suggestion of a   nonobstructing stone within the superior aspect of the right kidney   measuring up to 6 mm.    Left kidney:  10.5 cm. No hydronephrosis or calculi. Partially exophytic   cyst is seen involving the lower pole measuring 2.7 x 2.8 x 2.0 cm    Urinary bladder: No debris or calculus. Bilateral ureteral jets are   visualized. Prevoid volume of approximately 67 cc.        IMPRESSION:    Nonobstructing right renal stone. Left renal cyst. Mildly distended   urinary bladder.    < end of copied text >      < from: CT Chest No Cont (07.27.23 @ 17:52) >    1. No evidence of acute intrathoracic or abdominal pathology.    2. Ventral hernia measuring 8.5 cm containing short segment of   nonobstructed transverse colon.    < end of copied text >          
Podiatry Consult Note    Subjective:    ARMANDO SCHMITZ is a 89y year old Female seen at bedside with a chief complaint of  painful thickened, dystrophic, ingrowing and long toenails digits 1-5 bilaterally  and preventative foot examination. Patient is medically managed  by Medicine/Hospitalists.  Patient denies any history of trauma to both feet. Patient has no other pedal complaints.  Patient is experiencing pain while standing, walking and in shoe gear.     PMH: HTN (hypertension)    CVA (cerebrovascular accident)    Dementia    Chronic atrial fibrillation    History of macular degeneration    Arthritis     PAST MEDICAL & SURGICAL HISTORY:  HTN (hypertension)      CVA (cerebrovascular accident)      Dementia      Chronic atrial fibrillation      History of macular degeneration      Arthritis      Presence of Watchman left atrial appendage closure device      Fracture, tibia and fibula        PSH:Presence of Watchman left atrial appendage closure device    Fracture, tibia and fibula      Allergies:penicillin (Unknown)  aspirin (Unknown)      Labs:                        11.0   7.84  )-----------( 304      ( 27 Jul 2023 16:49 )             33.8       WBC Trend  7.84 Date (07-27 @ 16:49)      Chem  07-28    140  |  101  |  47<H>  ----------------------------<  89  5.4<H>   |  25  |  2.6<H>    Ca    9.5      28 Jul 2023 07:00  Mg     2.2     07-28    TPro  6.8  /  Alb  4.0  /  TBili  0.4  /  DBili  x   /  AST  10  /  ALT  7   /  AlkPhos  106  07-28      T(F): 96 (07-28-23 @ 14:07), Max: 98.4 (07-28-23 @ 05:05)  HR: 55 (07-28-23 @ 14:07) (55 - 75)  BP: 100/51 (07-28-23 @ 14:07) (100/51 - 132/60)  RR: 20 (07-28-23 @ 05:05) (18 - 20)  SpO2: 97% (07-28-23 @ 05:05) (97% - 98%)  Wt(kg): --    Objective:   DERM:  Skin warm, dry and supple bilateral.  No open lesions or inter-digital macerations noted bilateral.   Toenails 1-5 Right and Left feet thickened, elongated, discolored, and dystrophic with subungual debris. There is pain upon palpation of all fungal and ingrowing nails 1-5 bilaterally.   VASC: Dorsalis Pedis and Posterior Tibial pulses faintly palpable.  Capillary re-fill time less than 3 seconds digits 1-5 bilateral.   NEURO: Protective sensation intact to the level of the digits bilateral.  MSK: Muscle strength 5/5 all major muscle groups bilateral. No structural abnormality, bilaterally    Assessment:   Bilateral dystrophic toenails consistent with  Onychomycosis.   Pain from Elongated nails, ingrowing, incurvated,  and dystrophic nails upon palpation of nails.  Xerosis of bilateral plantar feet.     Plan:   Discussed diagnosis and treatment with patient  Aseptic debridement and curettage of all fungal and ingrowing nails 1-5 bilateral with sterile nail nipper.  Discussed importance of daily foot examinations, drying of feet after bathing and proper shoe gear.  Patient Would Benefit From Periodic Debridements; Can Follow Up as Outpatient w/ Dr. Garcia Post Discharge   Discussed Plan w/ Attending; Dr. Garcia    Spectra;    
surgery called for ventral hernia found on CT 8.5 cm   seen with Dr. SANTANA    89 y/o F with pmhx of bedbound,  HTN, CVA without residual deficits, dementia, A-fib status post Watchman, ventral hernia,  admission in May for right tib-fib fracture   brought in for multiple emesis this morning after eating breakfast.   Pt poor historian due to dementia, per daughter at bedside patient vomited PTA few times NBNB after eating breakfast.  PT has denies any abdominal pain. Pt had and episode of loose stool, non bloody today. PT found to be hypotensive at home with BP 72/73. Pt denies dizziness or LOC.  PT denies chest pain or shortness of breath. Pt with hx of uti , last on May, Pt with incontinence, per daughter has been making good urine , no hematuria. PT denies fever, chills.       PAST MEDICAL & SURGICAL HISTORY:  HTN (hypertension)  CVA (cerebrovascular accident)  Dementia  Chronic atrial fibrillation  History of macular degeneration  Arthritis  Presence of Watchman left atrial appendage closure device  Fracture, tibia and fibula    Allergies:  penicillin (Unknown)  aspirin (Unknown)      Hospital Medications:   MEDICATIONS  (STANDING):  aMIOdarone    Tablet 100 milliGRAM(s) Oral daily  cefTRIAXone   IVPB 1000 milliGRAM(s) IV Intermittent every 24 hours  chlorhexidine 2% Cloths 1 Application(s) Topical <User Schedule>  citalopram 10 milliGRAM(s) Oral at bedtime  clopidogrel Tablet 75 milliGRAM(s) Oral daily  cyanocobalamin 1000 MICROGram(s) Oral daily  folic acid 1 milliGRAM(s) Oral at bedtime  heparin   Injectable 5000 Unit(s) SubCutaneous every 12 hours  pantoprazole    Tablet 40 milliGRAM(s) Oral before breakfast  polyethylene glycol 3350 17 Gram(s) Oral daily  senna 2 Tablet(s) Oral at bedtime      SOCIAL HISTORY:  Denies ETOH,Smoking,       REVIEW OF SYSTEMS:  Unable to obtain    VITALS:  T(F): 97.7 (07-29-23 @ 05:22), Max: 97.7 (07-29-23 @ 05:22)  HR: 61 (07-29-23 @ 05:22)  BP: 165/66 (07-29-23 @ 05:22)  RR: 18 (07-29-23 @ 05:22)  SpO2: --    07-28 @ 07:01  -  07-29 @ 07:00  --------------------------------------------------------  IN: 0 mL / OUT: 475 mL / NET: -475 mL      I&O's Detail    28 Jul 2023 07:01  -  29 Jul 2023 07:00  --------------------------------------------------------  IN:  Total IN: 0 mL    OUT:    Voided (mL): 475 mL  Total OUT: 475 mL    Total NET: -475 mL    PHYSICAL EXAM:  Constitutional: NAD  HEENT: anicteric sclera  Neck: No JVD  Respiratory: CTA  Cardiovascular: S1, S2, RRR  Gastrointestinal: BS+, soft, NT/ND ventral hernia visible and easily reducible   Extremities: No peripheral edema  Neurological: Awake alert  Psychiatric: Normal mood, normal affect  : No CVA tenderness. No goodwin.   Skin: No rashes  Vascular Access:    LABS:  07-28    134<L>  |  100  |  46<H>  ----------------------------<  95  5.1<H>   |  23  |  2.6<H>    Ca    9.0      28 Jul 2023 19:48  Mg     2.2     07-28    TPro  6.8  /  Alb  4.0  /  TBili  0.4  /  DBili      /  AST  10  /  ALT  7   /  AlkPhos  106  07-28    Creatinine Trend: 2.6 <--, 2.6 <--, 2.8 <--, 2.6 <--, 2.7 <--, 2.8 <--                        11.0   7.84  )-----------( 304      ( 27 Jul 2023 16:49 )             33.8     Urine Studies:  Urinalysis Basic - ( 28 Jul 2023 19:48 )    Color:  / Appearance:  / SG:  / pH:   Gluc: 95 mg/dL / Ketone:   / Bili:  / Urobili:    Blood:  / Protein:  / Nitrite:    Leuk Esterase:  / RBC:  / WBC    Sq Epi:  / Non Sq Epi:  / Bacteria:                 RADIOLOGY & ADDITIONAL STUDIES:    < from: US Kidney and Bladder (07.27.23 @ 22:27) >  Right kidney: 8.6 cm. no hydronephrosis. There is suggestion of a   nonobstructing stone within the superior aspect of the right kidney   measuring up to 6 mm.    Left kidney:  10.5 cm. No hydronephrosis or calculi. Partially exophytic   cyst is seen involving the lower pole measuring 2.7 x 2.8 x 2.0 cm    Urinary bladder: No debris or calculus. Bilateral ureteral jets are   visualized. Prevoid volume of approximately 67 cc.        IMPRESSION:    Nonobstructing right renal stone. Left renal cyst. Mildly distended   urinary bladder.    < end of copied text >      < from: CT Chest No Cont (07.27.23 @ 17:52) >    1. No evidence of acute intrathoracic or abdominal pathology.    2. Ventral hernia measuring 8.5 cm containing short segment of   nonobstructed transverse colon.    < end of copied text >

## 2023-07-29 NOTE — DIETITIAN INITIAL EVALUATION ADULT - NSFNSPHYEXAMSKINFT_GEN_A_CORE
Is This A New Presentation, Or A Follow-Up?: Skin Lesions How Severe Is Your Skin Lesion?: moderate Has Your Skin Lesion Been Treated?: not been treated stage II to sacral noted

## 2023-07-29 NOTE — DIETITIAN INITIAL EVALUATION ADULT - ORAL INTAKE PTA/DIET HISTORY
as per pt ate well PTA, no family at bedside at time of visit to confirm. will obtain nutrition hx upon f/u visit    pt was previously NPO diet order recently entered for soft and bite sized consistency as per daughter. SLP consulted as well  as per pt and confirmed by daughter at bedside pt consumed a soft cut up diet PTA with good intake. no recent weight changes. pt needs 1;1 feed    pt was previously NPO diet order recently entered for soft and bite sized consistency as per daughter tolerated well consumed >50% of meal in addition to meals brought in by family  SLP consulted as well 2/2 chronic cough noted when eating.

## 2023-07-29 NOTE — PROGRESS NOTE ADULT - SUBJECTIVE AND OBJECTIVE BOX
[place]-speaking wife of patient (Int. [id number]) said she didn't know why patient needed to see .     Jolie Rueda 89 y/o F with pmhx of bedbound,  HTN, CVA without residual deficits, dementia, A-fib status post Watchman, ventral hernia,  admission in May for right tib-fib fracture   brought in for multiple emesis this morning after eating breakfast.    Today:  Seen at bedside, no overnight events.        REVIEW OF SYSTEMS:  Not a reliable historian      MEDICATIONS  (STANDING):  aMIOdarone    Tablet 100 milliGRAM(s) Oral daily  cefTRIAXone   IVPB 1000 milliGRAM(s) IV Intermittent every 24 hours  chlorhexidine 2% Cloths 1 Application(s) Topical <User Schedule>  citalopram 10 milliGRAM(s) Oral at bedtime  clopidogrel Tablet 75 milliGRAM(s) Oral daily  cyanocobalamin 1000 MICROGram(s) Oral daily  folic acid 1 milliGRAM(s) Oral at bedtime  heparin   Injectable 5000 Unit(s) SubCutaneous every 12 hours  pantoprazole    Tablet 40 milliGRAM(s) Oral before breakfast  polyethylene glycol 3350 17 Gram(s) Oral daily  senna 2 Tablet(s) Oral at bedtime    MEDICATIONS  (PRN):  acetaminophen     Tablet .. 650 milliGRAM(s) Oral every 6 hours PRN Temp greater or equal to 38C (100.4F), Mild Pain (1 - 3)  aluminum hydroxide/magnesium hydroxide/simethicone Suspension 30 milliLiter(s) Oral every 4 hours PRN Dyspepsia  melatonin 3 milliGRAM(s) Oral at bedtime PRN Insomnia  ondansetron    Tablet 4 milliGRAM(s) Oral every 8 hours PRN Nausea and/or Vomiting      Allergies  penicillin (Unknown)  aspirin (Unknown)          Vital Signs Last 24 Hrs  T(C): 36.1 (29 Jul 2023 10:32), Max: 36.5 (29 Jul 2023 05:22)  T(F): 97 (29 Jul 2023 10:32), Max: 97.7 (29 Jul 2023 05:22)  HR: 55 (29 Jul 2023 10:32) (55 - 70)  BP: 108/50 (29 Jul 2023 10:32) (108/50 - 165/66)  RR: 16 (29 Jul 2023 10:32) (16 - 18)        PHYSICAL EXAM:  GEN: No acute distress  HEENT: normocephalic, atraumatic, aniceteric  LUNGS: bl breath sounds   HEART: S1/S2 present. RRR, no murmurs  ABD: Soft, non-tender, non-distended. Bowel sounds present  EXT: warm   NEURO: AAOX1      LABS:                        9.8    5.74  )-----------( 278      ( 29 Jul 2023 08:00 )             30.7     07-29    136  |  102  |  45<H>  ----------------------------<  85  5.2<H>   |  24  |  2.3<H>    Ca    9.0      29 Jul 2023 08:00  Mg     2.2     07-28    TPro  6.2  /  Alb  3.7  /  TBili  0.3  /  DBili  x   /  AST  10  /  ALT  6   /  AlkPhos  96  07-29      Urinalysis Basic - ( 29 Jul 2023 08:00 )    Color: x / Appearance: x / SG: x / pH: x  Gluc: 85 mg/dL / Ketone: x  / Bili: x / Urobili: x   Blood: x / Protein: x / Nitrite: x   Leuk Esterase: x / RBC: x / WBC x   Sq Epi: x / Non Sq Epi: x / Bacteria: x

## 2023-07-29 NOTE — CONSULT NOTE ADULT - ASSESSMENT
A 89 y/o F with pmhx of bedbound,  HTN, CVA without residual deficits, dementia, A-fib status post Watchman, ventral hernia,  admission in May for right tib-fib fracture   brought in for multiple emesis this morning after eating breakfast.      #NINA vs NINA on CKD - No OLD creat is available in [facility]  creat 2.8 - >2.6 after IVF  s/p 500 cc bolus and 50 cc/hr for 12 hrs  Currently NPO for S&S  - please RESUME D5NS 50 cc/hr until good po intake is established  pt was hypotensive, no congestion CT Chest  - place a Primafit  - kidney sono - R/L 8.5/10.5 no hydro or PVR    #UTI  #hx urinary incontinence s/p straight cath   -IV abx Rocephin 1 g q 24 hrs  -follow up Ucx  -monitor for fever   #hyperkalemia   -s/p LOKELMA   -repeat BMP     #nausea/vomiting   CT a/p Ventral hernia measuring 8.5 cm containing short segment of   nonobstructed transverse colon  -abdomen soft , hernia reducible  -zofran prn     will follow  
A 89 y/o F with pmhx of bedbound,  HTN, CVA without residual deficits, dementia, A-fib status post Watchman, ventral hernia,  admission in May for right tib-fib fracture   brought in for multiple emesis this morning after eating breakfast.      # ventral hernia - chronic non obstructed   #nausea/vomiting resolved as per nursing   CT a/p Ventral hernia measuring 8.5 cm containing short segment of   nonobstructed transverse colon  -abdomen soft , hernia reducible  -zofran prn   - NO INTERVENTION     will follow

## 2023-07-29 NOTE — DIETITIAN INITIAL EVALUATION ADULT - OTHER INFO
pt is 89 year old female, bedbound with hx of HTN, CVA, dementia, afib post watchman's,  ventral hernia, R tib-fib fx, presents with multiple episodes of emesis. admitted with UTI, NINA, elevated trops. CT abd--> + ventral hernia containing short segment of non-obstructing transverse colon.

## 2023-07-29 NOTE — PROGRESS NOTE ADULT - ASSESSMENT
89 y/o F with pmhx of bedbound,  HTN, CVA without residual deficits, dementia, A-fib status post Watchman, ventral hernia,  admission in May for right tib-fib fracture   brought in for multiple emesis this morning after eating breakfast.      #UTI  #H/O urinary incontinence s/p straight cath in ED  -no sepsis present on admission   -patient denies symptoms of burning/ pain on urination, however given dementia will treat   -Rocephin 1 g q 24 hrs  -follow up Ucx    #NINA vs  CKD4  #Hyperkalemia  - unknown Cr baseline , daughter reported patient has chronic renal failure   - CT AP no hydro  - RBUS - non obstructing stone   - s/p 500 cc bolus in ED  and sp gentle IV hydration 50 cc x 12 hours   - monitor I and O   - lokelma, monitor bmp  -avoid nephrotoxic meds, low potassium diet     #Dysphagia  -per daughter, patient should be on soft and bite-sized diet  -Will obtain SLP eval as patient observed to be coughing by staff after eating  -Daughter states she is able to feed patient more adequately as patient recognizes her    #Elevated troponin likely due to NINA - resolved   - EKG NS bradycardia with 1st degree A-V block  - Incomplete left bundle branch block  - trop 0.02-> 0.01-> 0.01  - repeat EKG noted w 1st degree AV block   - TTE unremarkable    #NBNB at home - resolved   - CT a/p Ventral hernia measuring 8.5 cm containing short segment of   nonobstructed transverse colon  -abdomen soft , hernia reducible  -zofran prn , monitor QTc noted 422 on EKG   -PPI   -Surgery consult appreciated; no intervention    #H/O HTN   -patient hypotensive at home BP 73/33 , now resolved   -will hold BP meds given well controlled bp   -resume bp meds as needed     # H/O CVA -cw Plavix (ALLERGIC TO ASA) , not on a statin at home, obtain lipid profile     #H/O chronic A-fib status post Watchman- cw amiodarone with holding parameters , not on AC per daughter     # H/O Decreased mobility  - patient baseline bed to chair , PT EVAL     # H/O Sacral Ulcer , stage 1 , wound eval , pressure offloadig, routine nursing care     # dvt/gi ppx/diet  # Dispo: acute        DNR/DNI    Updated daughter today.

## 2023-07-29 NOTE — DIETITIAN INITIAL EVALUATION ADULT - ADD RECOMMEND
RD to monitor tolerance to po diet, labs/meds, NFPF and f/u as needed within 5-7 days  moderate risk

## 2023-07-30 VITALS
DIASTOLIC BLOOD PRESSURE: 51 MMHG | RESPIRATION RATE: 16 BRPM | HEART RATE: 55 BPM | TEMPERATURE: 97 F | SYSTOLIC BLOOD PRESSURE: 105 MMHG

## 2023-07-30 PROCEDURE — 99238 HOSP IP/OBS DSCHRG MGMT 30/<: CPT

## 2023-07-30 RX ADMIN — CEFTRIAXONE 100 MILLIGRAM(S): 500 INJECTION, POWDER, FOR SOLUTION INTRAMUSCULAR; INTRAVENOUS at 05:50

## 2023-07-30 RX ADMIN — PANTOPRAZOLE SODIUM 40 MILLIGRAM(S): 20 TABLET, DELAYED RELEASE ORAL at 06:25

## 2023-07-30 RX ADMIN — HEPARIN SODIUM 5000 UNIT(S): 5000 INJECTION INTRAVENOUS; SUBCUTANEOUS at 05:50

## 2023-07-30 RX ADMIN — CHLORHEXIDINE GLUCONATE 1 APPLICATION(S): 213 SOLUTION TOPICAL at 05:50

## 2023-07-30 RX ADMIN — PREGABALIN 1000 MICROGRAM(S): 225 CAPSULE ORAL at 11:17

## 2023-07-30 RX ADMIN — CLOPIDOGREL BISULFATE 75 MILLIGRAM(S): 75 TABLET, FILM COATED ORAL at 11:17

## 2023-07-30 RX ADMIN — POLYETHYLENE GLYCOL 3350 17 GRAM(S): 17 POWDER, FOR SOLUTION ORAL at 11:18

## 2023-07-30 NOTE — DISCHARGE NOTE PROVIDER - NSDCMRMEDTOKEN_GEN_ALL_CORE_FT
amiodarone 100 mg oral tablet: 1 tablet orally once a day  amLODIPine 5 mg oral tablet: 1 orally once a day  citalopram 10 mg oral tablet: 1 orally once a day (at bedtime)  clopidogrel 75 mg oral tablet: 1 orally once a day  folic acid 1 mg oral tablet: 1 orally once a day (at bedtime)  furosemide 20 mg oral tablet: 1 orally once a day  spironolactone 25 mg oral tablet: 1 orally once a day  valsartan 160 mg oral tablet: 1 orally once a day

## 2023-07-30 NOTE — DISCHARGE NOTE NURSING/CASE MANAGEMENT/SOCIAL WORK - PATIENT PORTAL LINK FT
You can access the FollowMyHealth Patient Portal offered by [facility] by registering at the following website: [email or web address]. By joining [facility]’s FollowMyHealth portal, you will also be able to view your health information using other applications (apps) compatible with our system.

## 2023-07-30 NOTE — DISCHARGE NOTE PROVIDER - HOSPITAL COURSE
89 y/o F with pmhx of bedbound,  HTN, CVA without residual deficits, dementia, A-fib status post Watchman, ventral hernia,  admission in May for right tib-fib fracture   brought in for multiple emesis this morning after eating breakfast.      #UTI  #H/O urinary incontinence s/p straight cath in ED  -no sepsis present on admission   -Rocephin 1 g q 24 hrs completed 3 doses  -Ucx growing E coli    #NINA vs likely CKD4  #Hyperkalemia  - unknown Cr baseline , daughter reported patient has chronic renal failure   - CT AP no hydro  - RBUS - non obstructing stone   - s/p 500 cc bolus in ED and sp gentle IV hydration 50 cc x 12 hours   - Cr stable, no need for further inpatient work up, follow with PMD    #Dysphagia  -per daughter, patient should be on soft and bite-sized diet  -Daughter states she is able to feed patient more adequately as patient recognizes her  -SLP eval appreciated, continue patient on soft and bite-sized diet    #Elevated troponin likely due to NINA - resolved   - EKG NS bradycardia with 1st degree A-V block  - Incomplete left bundle branch block  - trop 0.02-> 0.01-> 0.01  - repeat EKG noted w 1st degree AV block   - TTE unremarkable    #NBNB at home - resolved   - CT a/p Ventral hernia measuring 8.5 cm containing short segment of   nonobstructed transverse colon  -abdomen soft , hernia reducible  -zofran prn , monitor QTc noted 422 on EKG   -PPI   -Surgery consult appreciated; no intervention    #H/O HTN   -continue home meds    # H/O CVA -cw Plavix (ALLERGIC TO ASA) , not on a statin at home    #H/O chronic A-fib status post Watchman- cw amiodarone with holding parameters , not on AC per daughter     # H/O Decreased mobility  - has HHA    # H/O Sacral Ulcer , stage 1 , wound eval , pressure offloading    DC home with HHA

## 2023-07-30 NOTE — DISCHARGE NOTE NURSING/CASE MANAGEMENT/SOCIAL WORK - NSDCPEFALRISK_GEN_ALL_CORE
For information on Fall & Injury Prevention, visit: [email or web address] OR  [email or web address] OR  https://www.cdc.gov/steadi/patient.html

## 2023-07-30 NOTE — SWALLOW BEDSIDE ASSESSMENT ADULT - COMMENTS
Per RN this AM for breakfast pt. p/w prolonged mastication and pocketing bolus. Pts. daughter at b/w reported this is her eating behaviors when she doesn't like the food choices.

## 2023-07-31 LAB
-  AMIKACIN: SIGNIFICANT CHANGE UP
-  AMOXICILLIN/CLAVULANIC ACID: SIGNIFICANT CHANGE UP
-  AMPICILLIN/SULBACTAM: SIGNIFICANT CHANGE UP
-  AMPICILLIN: SIGNIFICANT CHANGE UP
-  AZTREONAM: SIGNIFICANT CHANGE UP
-  CEFAZOLIN: SIGNIFICANT CHANGE UP
-  CEFEPIME: SIGNIFICANT CHANGE UP
-  CEFTRIAXONE: SIGNIFICANT CHANGE UP
-  CEFUROXIME: SIGNIFICANT CHANGE UP
-  CIPROFLOXACIN: SIGNIFICANT CHANGE UP
-  ERTAPENEM: SIGNIFICANT CHANGE UP
-  GENTAMICIN: SIGNIFICANT CHANGE UP
-  IMIPENEM: SIGNIFICANT CHANGE UP
-  LEVOFLOXACIN: SIGNIFICANT CHANGE UP
-  MEROPENEM: SIGNIFICANT CHANGE UP
-  NITROFURANTOIN: SIGNIFICANT CHANGE UP
-  PIPERACILLIN/TAZOBACTAM: SIGNIFICANT CHANGE UP
-  TOBRAMYCIN: SIGNIFICANT CHANGE UP
-  TRIMETHOPRIM/SULFAMETHOXAZOLE: SIGNIFICANT CHANGE UP
CULTURE RESULTS: SIGNIFICANT CHANGE UP
METHOD TYPE: SIGNIFICANT CHANGE UP
ORGANISM # SPEC MICROSCOPIC CNT: SIGNIFICANT CHANGE UP
ORGANISM # SPEC MICROSCOPIC CNT: SIGNIFICANT CHANGE UP
SPECIMEN SOURCE: SIGNIFICANT CHANGE UP

## 2023-08-01 NOTE — CHART NOTE - NSCHARTNOTEFT_GEN_A_CORE
Evening BMP resulted with K of 5.4 (slightly hemolyzed). Will dose additional 10mg of Lokelma.
Received message regarding lab result after discharge-ESBL ecoli.    Reviewed case, discussed with ID; given presentation on admission (nausea, vomiting) it is very likely this was actually asymptomatic bacteruria.  Patient was stable while on Rocephin which was given empirically.  No need for further antibiotic coverage at this time.
Called by RN, reports significant coughing during meals, concern for dysphagia.  Will place NPO and have SLP evaluate patient.  d/w medicine attending.

## 2023-08-02 LAB
CULTURE RESULTS: SIGNIFICANT CHANGE UP
SPECIMEN SOURCE: SIGNIFICANT CHANGE UP

## 2023-08-10 NOTE — CDI QUERY NOTE - NSCDIOTHERTXTBX_GEN_ALL_CORE_HH
Query:  Based on your clinical evaluation of the patient, please provide us with a medical diagnosis that is explained by the below findings:  • complete immobility due to severe physical disability   • Complete immobility due to severe frailty   • Functional quadriplegia  • Other (please specify)  • Unable to determine                             ===========================================  Clinical indicators:  ** 7/28 Physical Therapy Evaluation:       A 89 y/o F with pmhx of bedbound,  HTN, CVA without residual deficits, dementia       Additional Comments: pt is a 89 y.o. F, who lives alone in mother-daughter PH. Daughter lives in other part of the . Pt has been bed bound/wc bound. Family/HHA uses dion lift to transfer pt. Pt is dependent with bed mobility, transfers, and all ADLs. HHA is present 24/7. Information obtained by the pt's daughter     Bed Mobility Analysis:        Bed Mobility Limitations: decreased ability to use arms for pushing/pulling; decreased ability to use legs for bridging/pushing; impaired ability to control trunk for mobility     Balance Skills Assessment:         Sitting Balance: Static: good minus        Sitting Balance: Dynamic: fair plus        Sit-to-Stand Balance: poor balance        Standing Balance: Static:	poor balance        Standing Balance: Dynamic: poor minus  attempted but unable to perform 2/2 pt weakness and pain    ** 7/27 Patient Profile Adult (RN note:     Functional Assessment: Basic Mobility	.       Turning from your back to your side while in a flat bed without using bedrails?                           1 = Total assistance       Moving from lying on your back to sitting on the flat side of a flat bed without using bedrails?    1 = Total assistance       Moving to and from a bed to a chair (including a wheelchair)?                                                   1 = Total assistance       Standing up from a chair using your arms (e.g. wheelchair or bedside chair)?                             1 = Total assistance       Walking in hospital room?	                                                                                          1 = Total assistance       Climbing 3-5 steps with a railing?       	                                                                            1 = Total assistance                                                                                                                                      Score      6     Functional Assessment: Daily Activity	.        Putting on and taking off regular lower body clothing         1 = Total assistance        Bathing (including washing, rinsing, drying)?	                      1 = Total assistance        Toileting, which includes using toilet, bedpan or urinal?       1 = Total assistance        Putting on and taking off regular upper body clothing?       1 = Total assistance        Take care of personal grooming such as brushing teeth?    1 = Total assistance        Eating meals?                   	                                    1 = Total assistance Query:  Based on your clinical evaluation of the patient, please provide us with a medical diagnosis that is explained by the below findings:  • complete immobility due to severe physical disability   • Complete immobility due to severe frailty   • Functional quadriplegia  • Other (please specify)  • Unable to determine                             ===========================================  Clinical indicators:  ** 7/28 Physical Therapy Evaluation:       A 89 y/o F with pmhx of bedbound,  HTN, CVA without residual deficits, dementia       Additional Comments: pt is a 89 y.o. F, who lives alone in mother-daughter PH. Daughter lives in other part of the . Pt has been bed bound/wc bound. Family/HHA uses dion lift to transfer pt. Pt is dependent with bed mobility, transfers, and all ADLs. HHA is present 24/7. Information obtained by the pt's daughter     Bed Mobility Analysis:        Bed Mobility Limitations: decreased ability to use arms for pushing/pulling; decreased ability to use legs for bridging/pushing; impaired ability to control trunk for mobility     Balance Skills Assessment:         Sitting Balance: Static: good minus        Sitting Balance: Dynamic: fair plus        Sit-to-Stand Balance: poor balance        Standing Balance: Static:	poor balance        Standing Balance: Dynamic: poor minus  attempted but unable to perform 2/2 pt weakness and pain    ** 7/27 Patient Profile Adult (RN note:     Functional Assessment: Basic Mobility	.       Turning from your back to your side while in a flat bed without using bedrails?                                                     1 = Total assistance       Moving from lying on your back to sitting on the flat side of a flat bed without using bedrails?                               1 = Total assistance       Moving to and from a bed to a chair (including a wheelchair)?                                                                             1 = Total assistance       Standing up from a chair using your arms (e.g. wheelchair or bedside chair)?                                                       1 = Total assistance       Walking in hospital room?	                                                                                                                   1 = Total assistance       Climbing 3-5 steps with a railing?       	                                                                                                      1 = Total assistance       Score      6       Functional Assessment: Daily Activity	.        Putting on and taking off regular lower body clothing         1 = Total assistance        Bathing (including washing, rinsing, drying)?	                      1 = Total assistance        Toileting, which includes using toilet, bedpan or urinal?       1 = Total assistance        Putting on and taking off regular upper body clothing?       1 = Total assistance        Take care of personal grooming such as brushing teeth?    1 = Total assistance        Eating meals?                   	                                    1 = Total assistance

## 2025-05-22 ENCOUNTER — INPATIENT (INPATIENT)
Facility: HOSPITAL | Age: 89
LOS: 5 days | Discharge: HOME HEALTH | DRG: 689 | End: 2025-05-28
Attending: STUDENT IN AN ORGANIZED HEALTH CARE EDUCATION/TRAINING PROGRAM | Admitting: INTERNAL MEDICINE
Payer: MEDICARE

## 2025-05-22 VITALS
HEART RATE: 100 BPM | OXYGEN SATURATION: 96 % | RESPIRATION RATE: 18 BRPM | WEIGHT: 110.01 LBS | DIASTOLIC BLOOD PRESSURE: 67 MMHG | SYSTOLIC BLOOD PRESSURE: 107 MMHG

## 2025-05-22 DIAGNOSIS — R41.82 ALTERED MENTAL STATUS, UNSPECIFIED: ICD-10-CM

## 2025-05-22 DIAGNOSIS — R33.9 RETENTION OF URINE, UNSPECIFIED: ICD-10-CM

## 2025-05-22 DIAGNOSIS — B96.20 UNSPECIFIED ESCHERICHIA COLI [E. COLI] AS THE CAUSE OF DISEASES CLASSIFIED ELSEWHERE: ICD-10-CM

## 2025-05-22 DIAGNOSIS — M48.00 SPINAL STENOSIS, SITE UNSPECIFIED: ICD-10-CM

## 2025-05-22 DIAGNOSIS — F32.A DEPRESSION, UNSPECIFIED: ICD-10-CM

## 2025-05-22 DIAGNOSIS — K52.9 NONINFECTIVE GASTROENTERITIS AND COLITIS, UNSPECIFIED: ICD-10-CM

## 2025-05-22 DIAGNOSIS — E87.6 HYPOKALEMIA: ICD-10-CM

## 2025-05-22 DIAGNOSIS — R53.2 FUNCTIONAL QUADRIPLEGIA: ICD-10-CM

## 2025-05-22 DIAGNOSIS — S82.209A UNSPECIFIED FRACTURE OF SHAFT OF UNSPECIFIED TIBIA, INITIAL ENCOUNTER FOR CLOSED FRACTURE: Chronic | ICD-10-CM

## 2025-05-22 DIAGNOSIS — Z95.818 PRESENCE OF OTHER CARDIAC IMPLANTS AND GRAFTS: Chronic | ICD-10-CM

## 2025-05-22 DIAGNOSIS — Z16.12 EXTENDED SPECTRUM BETA LACTAMASE (ESBL) RESISTANCE: ICD-10-CM

## 2025-05-22 DIAGNOSIS — H35.30 UNSPECIFIED MACULAR DEGENERATION: ICD-10-CM

## 2025-05-22 DIAGNOSIS — I48.20 CHRONIC ATRIAL FIBRILLATION, UNSPECIFIED: ICD-10-CM

## 2025-05-22 DIAGNOSIS — I12.9 HYPERTENSIVE CHRONIC KIDNEY DISEASE WITH STAGE 1 THROUGH STAGE 4 CHRONIC KIDNEY DISEASE, OR UNSPECIFIED CHRONIC KIDNEY DISEASE: ICD-10-CM

## 2025-05-22 DIAGNOSIS — K59.00 CONSTIPATION, UNSPECIFIED: ICD-10-CM

## 2025-05-22 DIAGNOSIS — K57.30 DIVERTICULOSIS OF LARGE INTESTINE WITHOUT PERFORATION OR ABSCESS WITHOUT BLEEDING: ICD-10-CM

## 2025-05-22 DIAGNOSIS — Z74.01 BED CONFINEMENT STATUS: ICD-10-CM

## 2025-05-22 DIAGNOSIS — D84.81 IMMUNODEFICIENCY DUE TO CONDITIONS CLASSIFIED ELSEWHERE: ICD-10-CM

## 2025-05-22 DIAGNOSIS — M19.90 UNSPECIFIED OSTEOARTHRITIS, UNSPECIFIED SITE: ICD-10-CM

## 2025-05-22 DIAGNOSIS — E87.0 HYPEROSMOLALITY AND HYPERNATREMIA: ICD-10-CM

## 2025-05-22 DIAGNOSIS — Z86.73 PERSONAL HISTORY OF TRANSIENT ISCHEMIC ATTACK (TIA), AND CEREBRAL INFARCTION WITHOUT RESIDUAL DEFICITS: ICD-10-CM

## 2025-05-22 DIAGNOSIS — N17.9 ACUTE KIDNEY FAILURE, UNSPECIFIED: ICD-10-CM

## 2025-05-22 DIAGNOSIS — Z66 DO NOT RESUSCITATE: ICD-10-CM

## 2025-05-22 DIAGNOSIS — Z74.1 NEED FOR ASSISTANCE WITH PERSONAL CARE: ICD-10-CM

## 2025-05-22 DIAGNOSIS — F03.93 UNSPECIFIED DEMENTIA, UNSPECIFIED SEVERITY, WITH MOOD DISTURBANCE: ICD-10-CM

## 2025-05-22 DIAGNOSIS — N18.4 CHRONIC KIDNEY DISEASE, STAGE 4 (SEVERE): ICD-10-CM

## 2025-05-22 DIAGNOSIS — R62.7 ADULT FAILURE TO THRIVE: ICD-10-CM

## 2025-05-22 DIAGNOSIS — Z88.0 ALLERGY STATUS TO PENICILLIN: ICD-10-CM

## 2025-05-22 DIAGNOSIS — N39.0 URINARY TRACT INFECTION, SITE NOT SPECIFIED: ICD-10-CM

## 2025-05-22 DIAGNOSIS — L89.156 PRESSURE-INDUCED DEEP TISSUE DAMAGE OF SACRAL REGION: ICD-10-CM

## 2025-05-22 DIAGNOSIS — Z79.02 LONG TERM (CURRENT) USE OF ANTITHROMBOTICS/ANTIPLATELETS: ICD-10-CM

## 2025-05-22 LAB
ALBUMIN SERPL ELPH-MCNC: 3.2 G/DL — LOW (ref 3.5–5.2)
ALP SERPL-CCNC: 108 U/L — SIGNIFICANT CHANGE UP (ref 30–115)
ALT FLD-CCNC: 10 U/L — SIGNIFICANT CHANGE UP (ref 0–41)
ANION GAP SERPL CALC-SCNC: 14 MMOL/L — SIGNIFICANT CHANGE UP (ref 7–14)
APPEARANCE UR: ABNORMAL
APTT BLD: 29.6 SEC — SIGNIFICANT CHANGE UP (ref 27–39.2)
AST SERPL-CCNC: 25 U/L — SIGNIFICANT CHANGE UP (ref 0–41)
BACTERIA # UR AUTO: ABNORMAL /HPF
BASOPHILS # BLD AUTO: 0.03 K/UL — SIGNIFICANT CHANGE UP (ref 0–0.2)
BASOPHILS NFR BLD AUTO: 0.2 % — SIGNIFICANT CHANGE UP (ref 0–1)
BILIRUB SERPL-MCNC: 0.8 MG/DL — SIGNIFICANT CHANGE UP (ref 0.2–1.2)
BILIRUB UR-MCNC: NEGATIVE — SIGNIFICANT CHANGE UP
BUN SERPL-MCNC: 48 MG/DL — HIGH (ref 10–20)
CALCIUM SERPL-MCNC: 8.9 MG/DL — SIGNIFICANT CHANGE UP (ref 8.4–10.5)
CHLORIDE SERPL-SCNC: 105 MMOL/L — SIGNIFICANT CHANGE UP (ref 98–110)
CO2 SERPL-SCNC: 24 MMOL/L — SIGNIFICANT CHANGE UP (ref 17–32)
COLOR SPEC: SIGNIFICANT CHANGE UP
CREAT SERPL-MCNC: 2 MG/DL — HIGH (ref 0.7–1.5)
DIFF PNL FLD: NEGATIVE — SIGNIFICANT CHANGE UP
EGFR: 22 ML/MIN/1.73M2 — LOW
EGFR: 22 ML/MIN/1.73M2 — LOW
EOSINOPHIL # BLD AUTO: 0 K/UL — SIGNIFICANT CHANGE UP (ref 0–0.7)
EOSINOPHIL NFR BLD AUTO: 0 % — SIGNIFICANT CHANGE UP (ref 0–8)
EPI CELLS # UR: SIGNIFICANT CHANGE UP
FLUAV AG NPH QL: SIGNIFICANT CHANGE UP
FLUBV AG NPH QL: SIGNIFICANT CHANGE UP
GLUCOSE SERPL-MCNC: 125 MG/DL — HIGH (ref 70–99)
GLUCOSE UR QL: NEGATIVE MG/DL — SIGNIFICANT CHANGE UP
HCT VFR BLD CALC: 45.5 % — SIGNIFICANT CHANGE UP (ref 37–47)
HGB BLD-MCNC: 14.7 G/DL — SIGNIFICANT CHANGE UP (ref 12–16)
IMM GRANULOCYTES NFR BLD AUTO: 0.7 % — HIGH (ref 0.1–0.3)
INR BLD: 1.02 RATIO — SIGNIFICANT CHANGE UP (ref 0.65–1.3)
KETONES UR QL: ABNORMAL MG/DL
LACTATE SERPL-SCNC: 2.5 MMOL/L — HIGH (ref 0.7–2)
LEUKOCYTE ESTERASE UR-ACNC: ABNORMAL
LYMPHOCYTES # BLD AUTO: 0.99 K/UL — LOW (ref 1.2–3.4)
LYMPHOCYTES # BLD AUTO: 7 % — LOW (ref 20.5–51.1)
MCHC RBC-ENTMCNC: 28.8 PG — SIGNIFICANT CHANGE UP (ref 27–31)
MCHC RBC-ENTMCNC: 32.3 G/DL — SIGNIFICANT CHANGE UP (ref 32–37)
MCV RBC AUTO: 89 FL — SIGNIFICANT CHANGE UP (ref 81–99)
MONOCYTES # BLD AUTO: 0.33 K/UL — SIGNIFICANT CHANGE UP (ref 0.1–0.6)
MONOCYTES NFR BLD AUTO: 2.3 % — SIGNIFICANT CHANGE UP (ref 1.7–9.3)
NEUTROPHILS # BLD AUTO: 12.74 K/UL — HIGH (ref 1.4–6.5)
NEUTROPHILS NFR BLD AUTO: 89.8 % — HIGH (ref 42.2–75.2)
NITRITE UR-MCNC: NEGATIVE — SIGNIFICANT CHANGE UP
NRBC BLD AUTO-RTO: 0 /100 WBCS — SIGNIFICANT CHANGE UP (ref 0–0)
PH UR: 7 — SIGNIFICANT CHANGE UP (ref 5–8)
PLATELET # BLD AUTO: 247 K/UL — SIGNIFICANT CHANGE UP (ref 130–400)
PMV BLD: 10 FL — SIGNIFICANT CHANGE UP (ref 7.4–10.4)
POTASSIUM SERPL-MCNC: 5.9 MMOL/L — HIGH (ref 3.5–5)
POTASSIUM SERPL-SCNC: 5.9 MMOL/L — HIGH (ref 3.5–5)
PROT SERPL-MCNC: 6.9 G/DL — SIGNIFICANT CHANGE UP (ref 6–8)
PROT UR-MCNC: 30 MG/DL
PROTHROM AB SERPL-ACNC: 12.1 SEC — SIGNIFICANT CHANGE UP (ref 9.95–12.87)
RBC # BLD: 5.11 M/UL — SIGNIFICANT CHANGE UP (ref 4.2–5.4)
RBC # FLD: 14.6 % — HIGH (ref 11.5–14.5)
RBC CASTS # UR COMP ASSIST: 2 /HPF — SIGNIFICANT CHANGE UP (ref 0–4)
RSV RNA NPH QL NAA+NON-PROBE: SIGNIFICANT CHANGE UP
SARS-COV-2 RNA SPEC QL NAA+PROBE: SIGNIFICANT CHANGE UP
SODIUM SERPL-SCNC: 143 MMOL/L — SIGNIFICANT CHANGE UP (ref 135–146)
SOURCE RESPIRATORY: SIGNIFICANT CHANGE UP
SP GR SPEC: 1.02 — SIGNIFICANT CHANGE UP (ref 1–1.03)
UROBILINOGEN FLD QL: 1 MG/DL — SIGNIFICANT CHANGE UP (ref 0.2–1)
WBC # BLD: 14.19 K/UL — HIGH (ref 4.8–10.8)
WBC # FLD AUTO: 14.19 K/UL — HIGH (ref 4.8–10.8)
WBC UR QL: 30 /HPF — HIGH (ref 0–5)

## 2025-05-22 PROCEDURE — 93010 ELECTROCARDIOGRAM REPORT: CPT

## 2025-05-22 PROCEDURE — 74176 CT ABD & PELVIS W/O CONTRAST: CPT | Mod: 26

## 2025-05-22 PROCEDURE — 99285 EMERGENCY DEPT VISIT HI MDM: CPT | Mod: FS

## 2025-05-22 PROCEDURE — 71045 X-RAY EXAM CHEST 1 VIEW: CPT | Mod: 26

## 2025-05-22 PROCEDURE — 70450 CT HEAD/BRAIN W/O DYE: CPT | Mod: 26

## 2025-05-22 RX ORDER — ACETAMINOPHEN 500 MG/5ML
650 LIQUID (ML) ORAL ONCE
Refills: 0 | Status: COMPLETED | OUTPATIENT
Start: 2025-05-22 | End: 2025-05-22

## 2025-05-22 RX ORDER — CEFEPIME 2 G/20ML
2000 INJECTION, POWDER, FOR SOLUTION INTRAVENOUS ONCE
Refills: 0 | Status: COMPLETED | OUTPATIENT
Start: 2025-05-22 | End: 2025-05-22

## 2025-05-22 RX ADMIN — Medication 1550 MILLILITER(S): at 20:57

## 2025-05-22 RX ADMIN — Medication 1550 MILLILITER(S): at 22:45

## 2025-05-22 RX ADMIN — CEFEPIME 100 MILLIGRAM(S): 2 INJECTION, POWDER, FOR SOLUTION INTRAVENOUS at 20:57

## 2025-05-22 RX ADMIN — Medication 650 MILLIGRAM(S): at 20:57

## 2025-05-22 NOTE — ED PROVIDER NOTE - ATTENDING APP SHARED VISIT CONTRIBUTION OF CARE
89-year-old female past medical history of dementia, TIA, history of spinal stenosis, bedbound, macular degeneration, history of sacral wound, presents with aide and daughter for evaluation of increased weakness and confusion over the last 2 days.  Patient is also been having some diarrhea.  Aide reports decreased p.o. intake.  Patient also been with some vomiting.  On exam patient is in NAD, alert and awake, elderly, febrile in ED, abdomen soft nontender, dry mucous membranes, no edema

## 2025-05-22 NOTE — ED PROVIDER NOTE - CLINICAL SUMMARY MEDICAL DECISION MAKING FREE TEXT BOX
Labs and imaging obtained. Results reviewed and discussed with pts aide and daughter, abx were given.  nikita require admission

## 2025-05-22 NOTE — ED PROVIDER NOTE - WR ORDER NAME 1
Anticoagulation Summary  As of 7/29/2024      INR goal:  2.0-3.0   TTR:  73.8% (7 y)   INR used for dosing:  3.4 (7/29/2024)   Warfarin maintenance plan:  7.5 mg (5 mg x 1.5) every W; 10 mg (5 mg x 2) all other days   Weekly warfarin total:  67.5 mg   Plan last modified:  Linda Soto RN (7/29/2024)   Next INR check:  8/12/2024   Priority:  Follow-Up - 2 Weeks   Target end date:  Indefinite    Indications    Encounter for monitoring Coumadin therapy [Z51.81  Z79.01]  Recurrent acute deep vein thrombosis (DVT) of left lower extremity  (CMD) [I82.402]  Therapeutic drug monitoring [Z51.81]  Long term current use of anticoagulant therapy [Z79.01]  Other acute pulmonary embolism without acute cor pulmonale  (CMD) [I26.99]                 Anticoagulation Episode Summary       INR check location:  Anticoagulation Clinic    Preferred lab:      Send INR reminders to:  [facility] (OPEN ENROLLMENT) [place]    Comments:  [facility] Dr. Romero 8/22/23 - former Dr Otero patient          Anticoagulation Care Providers       Provider Role Specialty Phone number    Wolf Otero MD Referring [facility] [phone]    Nba Romero,  Responsible Cardiovascular Disease [phone]            Assessment  Yes No   []  [] Missed doses:   []  [] Extra doses:   []  [] Significant medication changes (RX, OTC, Herbal):   []  [] High-risk maintenance medications:                []  [] Vitamin K / dietary changes    []  [] Bleeding / bruising:   []  [] Falls / injury:   []  [] Acute illness:    []  [] Alcohol intake:   []  [] Procedures / hospitalization / ER visits:   []  [] Other:    Anticoagulation Summary  As of 7/29/2024      INR goal:  2.0-3.0   TTR:  73.8% (7 y)   INR used for dosing:  3.4 (7/29/2024)   Warfarin maintenance plan:  7.5 mg (5 mg x 1.5) every W; 10 mg (5 mg x 2) all other days   Weekly warfarin total:  67.5 mg   Plan last modified:  Linda Soto RN (7/29/2024)   Next INR check:  8/12/2024    Priority:  Follow-Up - 2 Weeks   Target end date:  Indefinite    Indications    Encounter for monitoring Coumadin therapy [Z51.81  Z79.01]  Recurrent acute deep vein thrombosis (DVT) of left lower extremity  (CMD) [I82.402]  Therapeutic drug monitoring [Z51.81]  Long term current use of anticoagulant therapy [Z79.01]  Other acute pulmonary embolism without acute cor pulmonale  (CMD) [I26.99]                 Anticoagulation Episode Summary       INR check location:  Anticoagulation Clinic    Preferred lab:      Send INR reminders to:  [facility] (OPEN ENROLLMENT) [place]    Comments:  [facility] Dr. Romero 8/22/23 - former Dr Otero patient          Anticoagulation Care Providers       Provider Role Specialty Phone number    Wolf Otero MD Referring [facility] [phone]    Nba Romero, [facility] Cardiovascular Disease [phone]            Assessment  Yes No   []  [x] Missed doses:   []  [x] Extra doses:   []  [x] Significant medication changes (RX, OTC, Herbal):   []  [x] High-risk maintenance medications:                [x]  [] Vitamin K / dietary changes    []  [x] Bleeding / bruising:   []  [x] Falls / injury:   []  [x] Acute illness:    []  [x] Alcohol intake:   []  [x] Procedures / hospitalization / ER visits:   []  [x] Other:    Patient came to clinic for anticoagulation monitoring.  Last INR on 7/19/24 was 3.1.  Dose maintained.   Today's INR is 3.4 and is above goal range of 2.0-3.0.    Current warfarin total weekly dose of 72.5 mg verified.  Informed the INR result is above therapeutic range and instructed to hold warfarin dose today then decrease current dose per protocol. Discussed dose and return date of 8/12/24 for next INR. See Anticoagulation flowsheet.    Dr. Jac Walls is in the office today supervising the treatment.    Call your physician or seek medical care immediately if you notice any of the following symptoms of a bleed:   Red, dark, coffee or cola colored urine  Red or  Xray Chest 1 View- PORTABLE-Urgent tar like stools  Excessive bleeding from gums or nose  Vomiting coffee colored or bright red material  Coughing up red tinged sputum  Severe or unprovoked pain (ex: severe Headache or Abdominal pain)  Sudden, spontaneous bruising for no reason  For female patients:  Excessive menstrual bleeding  A cut that will not stop bleeding within 10-15 mins  Symptoms associated with abnormal bleeding/high INR reviewed.    Encouraged to avoid activities that may result in a serious fall or injury and verbalizes understanding.    Instructed to contact the clinic with any unusual bleeding or bruising, any changes in medications, diet, health status, lifestyle, or any other changes, questions or concerns. Verbalized understanding of all discussed.

## 2025-05-22 NOTE — ED ADULT NURSE NOTE - NS_SISCREENINGSR_GEN_ALL_ED
Spoke with Dr. Noel Simons patient unable to take Toprol Dr. Reza Orellana took her off thought skin rash due to Toprol  Patient BP still elevated 921 systolic.  Thinks carvedilol makes her dizzy   Per Dr. Noel Simons should start Norvasc 2.5 mg twice a day in addition to hy Negative

## 2025-05-22 NOTE — ED PROVIDER NOTE - OBJECTIVE STATEMENT
89 y/o female with hx of dementia, spinal stenosis, macular degeneration, cva , bedbound presents to the ED with family for increased weakness, confusion and watery diarrhea over past few days. patient with decreased po intake. patient without any cough or congestion. patient with multiple episodes of vomiting without any hematemesis.

## 2025-05-22 NOTE — ED PROVIDER NOTE - CARE PLAN
Principal Discharge DX:	Sepsis  Secondary Diagnosis:	Acute UTI  Secondary Diagnosis:	Diverticulitis   1

## 2025-05-22 NOTE — ED PROVIDER NOTE - PHYSICAL EXAMINATION
Vital Signs: I have reviewed the initial vital signs.  Constitutional: elderly and frail  Eyes: PERRLA, EOMI, clear conjunctiva  ENT: MMM,   Cardiovascular: regular rate, regular rhythm, no murmur appreciated  Respiratory: unlabored respiratory effort, clear to auscultation bilaterally  Gastrointestinal: soft, non-tender, non-distended  abdomen, no pulsatile mass  Musculoskeletal: supple neck, no lower extremity edema, no bony tenderness  Integumentary: warm, dry, no rash  Neurologic: awake, alert, confused, bedbound, cranial nerves II-XII grossly intact, extremities’ motor and sensory functions grossly intact, no focal deficits

## 2025-05-23 DIAGNOSIS — Z51.5 ENCOUNTER FOR PALLIATIVE CARE: ICD-10-CM

## 2025-05-23 DIAGNOSIS — K52.9 NONINFECTIVE GASTROENTERITIS AND COLITIS, UNSPECIFIED: ICD-10-CM

## 2025-05-23 DIAGNOSIS — Z71.89 OTHER SPECIFIED COUNSELING: ICD-10-CM

## 2025-05-23 DIAGNOSIS — A41.9 SEPSIS, UNSPECIFIED ORGANISM: ICD-10-CM

## 2025-05-23 DIAGNOSIS — F03.90 UNSPECIFIED DEMENTIA, UNSPECIFIED SEVERITY, WITHOUT BEHAVIORAL DISTURBANCE, PSYCHOTIC DISTURBANCE, MOOD DISTURBANCE, AND ANXIETY: ICD-10-CM

## 2025-05-23 PROBLEM — Z86.69 PERSONAL HISTORY OF OTHER DISEASES OF THE NERVOUS SYSTEM AND SENSE ORGANS: Chronic | Status: ACTIVE | Noted: 2023-07-27

## 2025-05-23 PROBLEM — I63.9 CEREBRAL INFARCTION, UNSPECIFIED: Chronic | Status: ACTIVE | Noted: 2023-07-27

## 2025-05-23 PROBLEM — I48.20 CHRONIC ATRIAL FIBRILLATION, UNSPECIFIED: Chronic | Status: ACTIVE | Noted: 2023-07-27

## 2025-05-23 PROBLEM — I10 ESSENTIAL (PRIMARY) HYPERTENSION: Chronic | Status: ACTIVE | Noted: 2023-07-27

## 2025-05-23 PROBLEM — M19.90 UNSPECIFIED OSTEOARTHRITIS, UNSPECIFIED SITE: Chronic | Status: ACTIVE | Noted: 2023-07-27

## 2025-05-23 LAB
ANION GAP SERPL CALC-SCNC: 14 MMOL/L — SIGNIFICANT CHANGE UP (ref 7–14)
BLD GP AB SCN SERPL QL: SIGNIFICANT CHANGE UP
BUN SERPL-MCNC: 45 MG/DL — HIGH (ref 10–20)
CALCIUM SERPL-MCNC: 7.9 MG/DL — LOW (ref 8.4–10.5)
CHLORIDE SERPL-SCNC: 113 MMOL/L — HIGH (ref 98–110)
CO2 SERPL-SCNC: 21 MMOL/L — SIGNIFICANT CHANGE UP (ref 17–32)
CREAT SERPL-MCNC: 1.5 MG/DL — SIGNIFICANT CHANGE UP (ref 0.7–1.5)
EGFR: 32 ML/MIN/1.73M2 — LOW
EGFR: 32 ML/MIN/1.73M2 — LOW
GLUCOSE SERPL-MCNC: 67 MG/DL — LOW (ref 70–99)
HCT VFR BLD CALC: 34.4 % — LOW (ref 37–47)
HCT VFR BLD CALC: 36.4 % — LOW (ref 37–47)
HGB BLD-MCNC: 11 G/DL — LOW (ref 12–16)
HGB BLD-MCNC: 11.6 G/DL — LOW (ref 12–16)
LACTATE SERPL-SCNC: 1.8 MMOL/L — SIGNIFICANT CHANGE UP (ref 0.7–2)
MCHC RBC-ENTMCNC: 28.8 PG — SIGNIFICANT CHANGE UP (ref 27–31)
MCHC RBC-ENTMCNC: 29.1 PG — SIGNIFICANT CHANGE UP (ref 27–31)
MCHC RBC-ENTMCNC: 31.9 G/DL — LOW (ref 32–37)
MCHC RBC-ENTMCNC: 32 G/DL — SIGNIFICANT CHANGE UP (ref 32–37)
MCV RBC AUTO: 90.1 FL — SIGNIFICANT CHANGE UP (ref 81–99)
MCV RBC AUTO: 91.2 FL — SIGNIFICANT CHANGE UP (ref 81–99)
NRBC BLD AUTO-RTO: 0 /100 WBCS — SIGNIFICANT CHANGE UP (ref 0–0)
NRBC BLD AUTO-RTO: 0 /100 WBCS — SIGNIFICANT CHANGE UP (ref 0–0)
PLATELET # BLD AUTO: 194 K/UL — SIGNIFICANT CHANGE UP (ref 130–400)
PLATELET # BLD AUTO: 200 K/UL — SIGNIFICANT CHANGE UP (ref 130–400)
PMV BLD: 9.6 FL — SIGNIFICANT CHANGE UP (ref 7.4–10.4)
PMV BLD: 9.8 FL — SIGNIFICANT CHANGE UP (ref 7.4–10.4)
POTASSIUM SERPL-MCNC: 4 MMOL/L — SIGNIFICANT CHANGE UP (ref 3.5–5)
POTASSIUM SERPL-SCNC: 4 MMOL/L — SIGNIFICANT CHANGE UP (ref 3.5–5)
RBC # BLD: 3.82 M/UL — LOW (ref 4.2–5.4)
RBC # BLD: 3.99 M/UL — LOW (ref 4.2–5.4)
RBC # FLD: 14.5 % — SIGNIFICANT CHANGE UP (ref 11.5–14.5)
RBC # FLD: 14.6 % — HIGH (ref 11.5–14.5)
SODIUM SERPL-SCNC: 148 MMOL/L — HIGH (ref 135–146)
WBC # BLD: 10.69 K/UL — SIGNIFICANT CHANGE UP (ref 4.8–10.8)
WBC # BLD: 10.78 K/UL — SIGNIFICANT CHANGE UP (ref 4.8–10.8)
WBC # FLD AUTO: 10.69 K/UL — SIGNIFICANT CHANGE UP (ref 4.8–10.8)
WBC # FLD AUTO: 10.78 K/UL — SIGNIFICANT CHANGE UP (ref 4.8–10.8)

## 2025-05-23 PROCEDURE — 99233 SBSQ HOSP IP/OBS HIGH 50: CPT

## 2025-05-23 PROCEDURE — 92610 EVALUATE SWALLOWING FUNCTION: CPT | Mod: GN

## 2025-05-23 PROCEDURE — 99223 1ST HOSP IP/OBS HIGH 75: CPT | Mod: FS

## 2025-05-23 PROCEDURE — 92526 ORAL FUNCTION THERAPY: CPT | Mod: GN

## 2025-05-23 PROCEDURE — 74018 RADEX ABDOMEN 1 VIEW: CPT

## 2025-05-23 PROCEDURE — 86900 BLOOD TYPING SEROLOGIC ABO: CPT

## 2025-05-23 PROCEDURE — 99497 ADVNCD CARE PLAN 30 MIN: CPT | Mod: 25

## 2025-05-23 PROCEDURE — 80048 BASIC METABOLIC PNL TOTAL CA: CPT

## 2025-05-23 PROCEDURE — 99222 1ST HOSP IP/OBS MODERATE 55: CPT

## 2025-05-23 PROCEDURE — 86850 RBC ANTIBODY SCREEN: CPT

## 2025-05-23 PROCEDURE — 85025 COMPLETE CBC W/AUTO DIFF WBC: CPT

## 2025-05-23 PROCEDURE — 99223 1ST HOSP IP/OBS HIGH 75: CPT

## 2025-05-23 PROCEDURE — 83735 ASSAY OF MAGNESIUM: CPT

## 2025-05-23 PROCEDURE — 86901 BLOOD TYPING SEROLOGIC RH(D): CPT

## 2025-05-23 PROCEDURE — 36415 COLL VENOUS BLD VENIPUNCTURE: CPT

## 2025-05-23 PROCEDURE — 85027 COMPLETE CBC AUTOMATED: CPT

## 2025-05-23 RX ORDER — CITALOPRAM 20 MG/1
10 TABLET ORAL AT BEDTIME
Refills: 0 | Status: DISCONTINUED | OUTPATIENT
Start: 2025-05-23 | End: 2025-05-28

## 2025-05-23 RX ORDER — AMLODIPINE BESYLATE 10 MG/1
5 TABLET ORAL DAILY
Refills: 0 | Status: DISCONTINUED | OUTPATIENT
Start: 2025-05-23 | End: 2025-05-23

## 2025-05-23 RX ORDER — CLOPIDOGREL BISULFATE 75 MG/1
75 TABLET, FILM COATED ORAL DAILY
Refills: 0 | Status: DISCONTINUED | OUTPATIENT
Start: 2025-05-23 | End: 2025-05-28

## 2025-05-23 RX ORDER — SODIUM CHLORIDE 9 G/1000ML
1000 INJECTION, SOLUTION INTRAVENOUS
Refills: 0 | Status: DISCONTINUED | OUTPATIENT
Start: 2025-05-23 | End: 2025-05-25

## 2025-05-23 RX ORDER — HEPARIN SODIUM 1000 [USP'U]/ML
5000 INJECTION INTRAVENOUS; SUBCUTANEOUS EVERY 12 HOURS
Refills: 0 | Status: DISCONTINUED | OUTPATIENT
Start: 2025-05-23 | End: 2025-05-28

## 2025-05-23 RX ORDER — FUROSEMIDE 10 MG/ML
20 INJECTION INTRAMUSCULAR; INTRAVENOUS DAILY
Refills: 0 | Status: DISCONTINUED | OUTPATIENT
Start: 2025-05-23 | End: 2025-05-26

## 2025-05-23 RX ORDER — AMIODARONE HYDROCHLORIDE 50 MG/ML
100 INJECTION, SOLUTION INTRAVENOUS DAILY
Refills: 0 | Status: DISCONTINUED | OUTPATIENT
Start: 2025-05-23 | End: 2025-05-28

## 2025-05-23 RX ORDER — MEROPENEM 1 G/30ML
500 INJECTION INTRAVENOUS EVERY 12 HOURS
Refills: 0 | Status: DISCONTINUED | OUTPATIENT
Start: 2025-05-23 | End: 2025-05-27

## 2025-05-23 RX ORDER — METRONIDAZOLE 250 MG
TABLET ORAL
Refills: 0 | Status: DISCONTINUED | OUTPATIENT
Start: 2025-05-23 | End: 2025-05-23

## 2025-05-23 RX ORDER — METRONIDAZOLE 250 MG
500 TABLET ORAL EVERY 12 HOURS
Refills: 0 | Status: DISCONTINUED | OUTPATIENT
Start: 2025-05-23 | End: 2025-05-23

## 2025-05-23 RX ORDER — CEFEPIME 2 G/20ML
500 INJECTION, POWDER, FOR SOLUTION INTRAVENOUS DAILY
Refills: 0 | Status: DISCONTINUED | OUTPATIENT
Start: 2025-05-23 | End: 2025-05-23

## 2025-05-23 RX ORDER — METRONIDAZOLE 250 MG
500 TABLET ORAL ONCE
Refills: 0 | Status: DISCONTINUED | OUTPATIENT
Start: 2025-05-23 | End: 2025-05-23

## 2025-05-23 RX ORDER — METRONIDAZOLE 250 MG
500 TABLET ORAL ONCE
Refills: 0 | Status: COMPLETED | OUTPATIENT
Start: 2025-05-23 | End: 2025-05-23

## 2025-05-23 RX ORDER — FOLIC ACID 1 MG/1
1 TABLET ORAL AT BEDTIME
Refills: 0 | Status: DISCONTINUED | OUTPATIENT
Start: 2025-05-23 | End: 2025-05-28

## 2025-05-23 RX ORDER — SPIRONOLACTONE 25 MG
25 TABLET ORAL DAILY
Refills: 0 | Status: DISCONTINUED | OUTPATIENT
Start: 2025-05-23 | End: 2025-05-23

## 2025-05-23 RX ADMIN — Medication 50 MILLILITER(S): at 06:45

## 2025-05-23 RX ADMIN — SODIUM CHLORIDE 50 MILLILITER(S): 9 INJECTION, SOLUTION INTRAVENOUS at 17:24

## 2025-05-23 RX ADMIN — Medication 1000 MILLILITER(S): at 00:19

## 2025-05-23 RX ADMIN — HEPARIN SODIUM 5000 UNIT(S): 1000 INJECTION INTRAVENOUS; SUBCUTANEOUS at 18:28

## 2025-05-23 RX ADMIN — CLOPIDOGREL BISULFATE 75 MILLIGRAM(S): 75 TABLET, FILM COATED ORAL at 12:40

## 2025-05-23 RX ADMIN — MEROPENEM 100 MILLIGRAM(S): 1 INJECTION INTRAVENOUS at 18:21

## 2025-05-23 RX ADMIN — Medication 100 MILLIGRAM(S): at 02:56

## 2025-05-23 RX ADMIN — FOLIC ACID 1 MILLIGRAM(S): 1 TABLET ORAL at 21:33

## 2025-05-23 RX ADMIN — CITALOPRAM 10 MILLIGRAM(S): 20 TABLET ORAL at 21:33

## 2025-05-23 RX ADMIN — Medication 1000 MILLILITER(S): at 01:40

## 2025-05-23 RX ADMIN — HEPARIN SODIUM 5000 UNIT(S): 1000 INJECTION INTRAVENOUS; SUBCUTANEOUS at 06:44

## 2025-05-23 NOTE — H&P ADULT - HISTORY OF PRESENT ILLNESS
89 y/o female presents to the ED with family for increased weakness, AMS,  and watery diarrhea over past few days. Pt  with hx of dementia, spinal stenosis, macular degeneration, cva , bedbound patient with decreased po intake. patient without any cough or congestion. patient with multiple episodes of vomiting without any hematemesis

## 2025-05-23 NOTE — CONSULT NOTE ADULT - ASSESSMENT
ASSESSMENT  This is a 89 year old female with dementia, spinal stenosis, macular degeneration, cva , bedbound patient is admitted with decreased po intake.    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    History of ESBL E.coli in urine 7/2023    Covid/Flu/RSV negative     RECOMMENDATIONS  -Follow up with blood cultures.  -Follow up with urine cultures.  -Can check C.diff and GI PCR if has recurrent diarrhea.   -Wound care evaluation for sacral pressure injury.  -IV Meropenem 500mg BID for now (5/23/2025)  -Off loading to prevent pressure sores and preventive measures to avoid aspiration. Very guarded prognosis. GOC.     If any questions, please send a message or call on [employer]  Please continue to update ID with any pertinent new laboratory or radiographic findings.    Tye Aquino M.D  Infectious Diseases Attending/   [employer] at [facility]

## 2025-05-23 NOTE — H&P ADULT - NS ATTEND AMEND GEN_ALL_CORE FT
Patient seen while in the ER, appears comfortable. Agree with above. Old records reviewed, copy of prior MOLST form is in chart but need to reconfirm with family ([facility]- Sophia?)

## 2025-05-23 NOTE — H&P ADULT - ASSESSMENT
89 y/o female presents to the ED with family for increased weakness, AMS,  and watery diarrhea over past few days. Pt  with hx of dementia, spinal stenosis, macular degeneration, cva , bedbound patient with decreased po intake. patient without any cough or congestion. patient with multiple episodes of vomiting without any hematemesis.    DX diverticulitis  vs colitis:  Iv ABX  cefepime  Flagyl  GI consult  npo  ivf  CBC  BMP    UTI with elevated WBC:  ABX  cefepime    altered mental states/ with HX of dementia:  head ct negative  no acute finding    continue home meds as per PMD    HTN:  Norvasc 5 md QD  VAlsartan 160mg QD    HX of heart disease//chronic A fib:  C/W amiodarone    demetia/depression:  citalopram    prophylaxis Gi/VTE    CAse D/W Dr Carr     89 y/o female presents to the ED with family for increased weakness, AMS,  and watery diarrhea over past few days. Pt  with hx of dementia, spinal stenosis, macular degeneration, cva , bedbound patient with decreased po intake. patient without any cough or congestion. patient with multiple episodes of vomiting without any hematemesis.    DX diverticulitis  vs colitis:  Iv ABX  cefepime  Flagyl  GI consult  npo  ivf  CBC  BMP    UTI with elevated WBC:  ABX  cefepime    altered mental states/ with HX of dementia:  head ct negative  no acute finding    continue home meds as per PMD    HTN:  Norvasc 5 md QD   hold VAlsartan 160mg QD f/u K  Hold spirinolactone  F/U K    HX of heart disease//chronic A fib:  C/W amiodarone    demetia/depression:  citalopram    prophylaxis Gi/VTE    CAse D/W Dr Carr     89 y/o female presents to the ED with family for increased weakness, AMS,  and watery diarrhea over past few days. Pt  with hx of dementia, spinal stenosis, macular degeneration, cva , bedbound patient with decreased po intake. patient without any cough or congestion. patient with multiple episodes of vomiting without any hematemesis.    A/P D/W pt's daughter Sophia tejeda [phone]    DX diverticulitis  vs colitis:  Iv ABX  cefepime  Flagyl  GI consult  npo  ivf  CBC  BMP    UTI with elevated WBC:  ABX  cefepime    altered mental states/ with HX of dementia:  head ct negative  no acute finding    continue home meds as per PMD    HTN:  Norvasc 5 md QD   hold VAlsartan 160mg QD f/u K  Hold spirinolactone  F/U K    HX of heart disease//chronic A fib:  C/W amiodarone    demetia/depression:  citalopram    prophylaxis Gi/VTE    CAse D/W Dr Carr

## 2025-05-23 NOTE — CONSULT NOTE ADULT - CONVERSATION DETAILS
Discussed with patient's daughter and granddaughter. Patient has limited ambulation at home due to spinal stenosis, limited to bed and wheelchair, and is on a puree diet but has had more difficult swallowing recently. Lives with daughter and has 24/7 HHA. Daughter feels her quality of life is declining, and would not want CPR, but is unsure about intubation; she is aware of these decisions as her father had a trach from lung cancer. She is open to DNR only for now. Discussed hospice briefly as well, she is not ready for hospice yet but aware of it for the future. Also discussed nutritional GOC, she is aware of PEG from her father, and is not sure if she would want PEG if patient cannot swallow; of note, patient passed S&S here and puree diet recommended.     16 mins spent

## 2025-05-23 NOTE — PATIENT PROFILE ADULT - LEGAL HELP
Patient refill request completed.  Patient start date for Soma 04/18/20  Patient to check him Pharmacy on 04/18/20.        ----- Message from Maki Roy sent at 4/13/2020  8:07 AM CDT -----  Contact: pt   Please call pt at [phone]    Patient is still waiting for carisoprodoL (SOMA) 350 MG tablet    Use [facility] [phone] (Phone)  [phone] (Fax)    Thank you    
no

## 2025-05-23 NOTE — CONSULT NOTE ADULT - SUBJECTIVE AND OBJECTIVE BOX
Chief complaint/Reason for consult: diverticulitis?    HPI:   89 y/o female presents to the ED with family for increased weakness, AMS,  and watery diarrhea over past few days. Pt  with hx of dementia, spinal stenosis, macular degeneration, cva , bedbound patient with decreased po intake. patient without any cough or congestion. patient with multiple episodes of vomiting without any hematemesis (23 May 2025 02:19)    GI updates: 89yFemale pmh HTN, Dementia presents for weakness and ams as well as diarrhea 4-5 episodes the past few days.  No hematemesis, melena, blood in stool reported. diarrhea appears to have resolved now per nursing team.      PAST MEDICAL & SURGICAL HISTORY:  HTN (hypertension)  CVA (cerebrovascular accident)  Dementia  Chronic atrial fibrillation  History of macular degeneration  Arthritis  Presence of Watchman left atrial appendage closure device  Fracture, tibia and fibula            Family history:  FAMILY HISTORY:    No GI cancers in first or second degree relatives    Social History: No smoking. No alcohol. No illegal drug use.    Allergies:   aspirin (Unknown)  penicillin (Unknown)  Intolerances            MEDICATIONS: Home Medications:  amiodarone 100 mg oral tablet: 1 tablet orally once a day (27 Jul 2023 19:12)  amLODIPine 5 mg oral tablet: 1 orally once a day (27 Jul 2023 18:35)  citalopram 10 mg oral tablet: 1 orally once a day (at bedtime) (27 Jul 2023 18:35)  clopidogrel 75 mg oral tablet: 1 orally once a day (27 Jul 2023 18:35)  folic acid 1 mg oral tablet: 1 orally once a day (at bedtime) (27 Jul 2023 18:35)  furosemide 20 mg oral tablet: 1 orally once a day (27 Jul 2023 18:35)  spironolactone 25 mg oral tablet: 1 orally once a day (27 Jul 2023 18:35)  valsartan 160 mg oral tablet: 1 orally once a day (27 Jul 2023 18:35)    MEDICATIONS  (STANDING):  aMIOdarone    Tablet 100 milliGRAM(s) Oral daily  amLODIPine   Tablet 5 milliGRAM(s) Oral daily  chlorhexidine 2% Cloths 1 Application(s) Topical <User Schedule>  citalopram 10 milliGRAM(s) Oral at bedtime  clopidogrel Tablet 75 milliGRAM(s) Oral daily  folic acid 1 milliGRAM(s) Oral at bedtime  furosemide    Tablet 20 milliGRAM(s) Oral daily  heparin   Injectable 5000 Unit(s) SubCutaneous every 12 hours  meropenem  IVPB 500 milliGRAM(s) IV Intermittent every 12 hours  sodium chloride 0.9%. 1000 milliLiter(s) (50 mL/Hr) IV Continuous <Continuous>      REVIEW OF SYSTEMS  unobtainable       VITALS:   T(F): 97.5 (05-23-25 @ 14:23), Max: 101 (05-22-25 @ 19:58)  HR: 56 (05-23-25 @ 14:23) (56 - 100)  BP: 113/77 (05-23-25 @ 14:23) (96/56 - 121/63)  RR: 18 (05-23-25 @ 14:23) (18 - 18)  SpO2: 99% (05-23-25 @ 14:23) (96% - 100%)    PHYSICAL EXAM:  GENERAL: not able to respond to prompts  HEAD:  Atraumatic, Normocephalic  EYES: conjunctiva and sclera clear  NECK: Supple, No thyromegaly   CHEST/LUNG: Clear to auscultation bilaterally; No wheeze, rhonchi, or rales  HEART: Regular rate and rhythm; normal S1, S2, No murmurs.  ABDOMEN: Soft, nontender, nondistended; Bowel sounds present  NEUROLOGY: No asterixis or tremor  SKIN: Intact, no jaundice          LABS:  05-23    148[H]  |  113[H]  |  45[H]  ----------------------------<  67[L]  4.0   |  21  |  1.5    Ca    7.9[L]      23 May 2025 04:30    TPro  6.9  /  Alb  3.2[L]  /  TBili  0.8  /  DBili  x   /  AST  25  /  ALT  10  /  AlkPhos  108  05-22                          11.0   10.69 )-----------( 200      ( 23 May 2025 12:23 )             34.4     LIVER FUNCTIONS - ( 22 May 2025 20:30 )  Alb: 3.2 g/dL / Pro: 6.9 g/dL / ALK PHOS: 108 U/L / ALT: 10 U/L / AST: 25 U/L / GGT: x           PT/INR - ( 22 May 2025 20:30 )   PT: 12.10 sec;   INR: 1.02 ratio         PTT - ( 22 May 2025 20:30 )  PTT:29.6 sec    IMAGING:      < from: CT Abdomen and Pelvis No Cont (05.22.25 @ 23:47) >    ACC: [id number] EXAM:  CT ABDOMEN AND PELVIS   ORDERED BY: NADIR ARAMBULA     PROCEDURE DATE:  05/22/2025          INTERPRETATION:  CLINICAL STATEMENT: diarrhea, fever    TECHNIQUE: Contiguous axial CT images were obtained from the lower chest   to the pubic symphysis without intravenous contrast.  Oral contrast was   administered.  Reformatted images in the coronal and sagittal planes were   acquired.    COMPARISON CT: CT abdomen pelvis 7/27/2023.    Note that the evaluation of solid organs andbowel loops is limited   secondary to lack of oral and IV contrast.    FINDINGS:    LOWER CHEST: Small right pleural effusion. Watchman device noted.    HEPATOBILIARY: Hepatic cysts. Cholelithiasis..    SPLEEN: Unremarkable.    PANCREAS: Atrophic..    ADRENAL GLANDS: Unremarkable.    KIDNEYS: No hydronephrosis. Nonobstructing right renal calculi.    ABDOMINOPELVIC NODES: Unremarkable.    PELVIC ORGANS: Post hysterectomy. Flexor and edema surrounding the   urinary bladder.    PERITONEUM/MESENTERY/BOWEL: Colonic diverticulosis. Presacral edema and   trace fluid. Fat stranding noted in the pelvis surrounding the urinary   bladder and rectosigmoid colon. Under distended sigmoid colon. No   intra-abdominal abscess. No pneumoperitoneum, or pneumatosis. No bowel   obstruction.    BONES/SOFT TISSUES: No acute osseous abnormality. Osteopenia with   degenerative changes. Anasarca. Subcutaneous edema and skin thickening   posterior to the sacrum. Fat-containing ventral hernia measuring 2.5 cm.    OTHER: Normal caliber aorta.      IMPRESSION:    Fat stranding and fluid mesenteric edema noted in the pelvis surrounding   the rectosigmoid bowel loops. Mild rectosigmoid wall thickening though   the signal colon is decompressed. Findings may reflect colitis.    Nonspecific urinary bladder wall thickening and surrounding fat   stranding. Correlate with urinalysis for cystitis.    Subcutaneous edema and skin thickening posterior to the sacrum. Correlate   for ulcer.    Additional findings detailed above.    --- End of Report ---          SINA TALLEY MD; Resident Radiologist  This document has been electronically signed.  GARO TSANG MD; Attending Radiologist  This document has been electronically signed. May 23 2025  2:36AM    < end of copied text >

## 2025-05-23 NOTE — CONSULT NOTE ADULT - NS ATTEST RISK PROBLEM GEN_ALL_CORE FT
Reviewed consultant notes  Reviewed labs     I have personally seen and examined this patient.    I have reviewed all pertinent clinical information and reviewed all relevant imaging and diagnostic studies personally.   I discussed recommendations with the primary team. I discussed recommendations with the primary team.

## 2025-05-23 NOTE — H&P ADULT - NSHPPHYSICALEXAM_GEN_ALL_CORE
Vital Signs Last 24 Hrs  T(C): 36.8 (22 May 2025 23:45), Max: 38.3 (22 May 2025 19:58)  T(F): 98.2 (22 May 2025 23:45), Max: 101 (22 May 2025 19:58)  HR: 66 (23 May 2025 01:42) (66 - 100)  BP: 121/63 (23 May 2025 01:42) (96/56 - 121/63)  BP(mean): --  RR: 18 (23 May 2025 01:42) (18 - 18)  SpO2: 98% (23 May 2025 01:42) (96% - 99%)    Parameters below as of 23 May 2025 01:42  Patient On (Oxygen Delivery Method): nasal cannula  O2 Flow (L/min): 2    GENERAL:  89y/o Female NAD, resting comfortably.  HEAD:  Atraumatic, Normocephalic  EYES: EOMI, PERRLA, conjunctiva and sclera clear  NECK: Supple, No JVD, no cervical lymphadenopathy, non-tender  CHEST/LUNG: Clear to auscultation bilaterally; No wheeze, rhonchi, or rales  HEART: Regular rate and rhythm; S1&S2  ABDOMEN: Soft, Nontender, Nondistended x 4 quadrants; Bowel sounds present  EXTREMITIES:   Peripheral Pulses Present, No clubbing, no cyanosis, or no edema, no calf tenderness  PSYCH: AAOx1, cooperative, appropriate  NEUROLOGY: WNL  SKIN: WNL

## 2025-05-23 NOTE — PATIENT PROFILE ADULT - FUNCTIONAL ASSESSMENT - BASIC MOBILITY 3.
Subjective:      Patient ID: Geneva Maddox is a 53 y.o. female. HPI    Rib Pain:  Patient started 2 weeks ago with pain along the right lower anterior ribs just inferior to the breast.  She saw her GYN 2 days ago and breast exam showed no mass. She denies any known injury. It is worse if she moves around a lot. It is severe at times (brings her to tears). It is worse with deep breathing or if she coughs / sneezes. She denies any dyspnea. Review of Systems   Constitutional:  Negative for chills and fever. Cardiovascular:  Positive for chest pain. There were no vitals taken for this visit. Objective:   Physical Exam  Constitutional:       General: She is not in acute distress. Appearance: Normal appearance. She is normal weight. She is not ill-appearing or toxic-appearing. Musculoskeletal:      Comments: Pain to palpation along the right anterior lower ribs, but no masses were found. No ecchymosis or erythema. No rash. Neurological:      Mental Status: She is alert and oriented to person, place, and time. Assessment:      Right Rib Pain - Likely Muscular Strain       Plan:      Medrol Dosepak   Flu Shot Declined   I recommended the Bivalent COVID vaccine.     Reminded patient to have a GYN exam and to do the Cologuard test  RTO 8 months for Hypothyroidism / Hyperlipidemia         [address]  1 = Total assistance

## 2025-05-23 NOTE — CONSULT NOTE ADULT - SUBJECTIVE AND OBJECTIVE BOX
Patient is a  89 y/o female presents to the ED with family for increased weakness, AMS,  and watery diarrhea over past few days. Pt  with hx of dementia, spinal stenosis, macular degeneration, cva , bedbound   with decreased po intake. patient without any cough or congestion. patient with multiple episodes of vomiting without any hematemesis.            PAST MEDICAL & SURGICAL HISTORY:  HTN (hypertension)  CVA (cerebrovascular accident)  Dementia  Chronic atrial fibrillation  History of macular degeneration  Arthritis  Presence of Watchman left atrial appendage closure device  Fracture, tibia and fibula          REVIEW OF SYSTEMS: Pt unable to offer    MEDICATIONS  (STANDING):  aMIOdarone    Tablet 100 milliGRAM(s) Oral daily  amLODIPine   Tablet 5 milliGRAM(s) Oral daily  chlorhexidine 2% Cloths 1 Application(s) Topical <User Schedule>  citalopram 10 milliGRAM(s) Oral at bedtime  clopidogrel Tablet 75 milliGRAM(s) Oral daily  folic acid 1 milliGRAM(s) Oral at bedtime  furosemide    Tablet 20 milliGRAM(s) Oral daily  heparin   Injectable 5000 Unit(s) SubCutaneous every 12 hours  meropenem  IVPB 500 milliGRAM(s) IV Intermittent every 12 hours  sodium chloride 0.9%. 1000 milliLiter(s) (50 mL/Hr) IV Continuous <Continuous>    MEDICATIONS  (PRN):      Allergies  aspirin (Unknown)  penicillin (Unknown)    Intolerances        SOCIAL HISTORY:     FAMILY HISTORY:         PHYSICAL EXAM:  Vital Signs Last 24 Hrs  T(C): 36.4 (23 May 2025 14:23), Max: 38.3 (22 May 2025 19:58)  T(F): 97.5 (23 May 2025 14:23), Max: 101 (22 May 2025 19:58)  HR: 56 (23 May 2025 14:23) (56 - 100)  BP: 113/77 (23 May 2025 14:23) (96/56 - 121/63)  BP(mean): --  RR: 18 (23 May 2025 14:23) (18 - 18)  SpO2: 99% (23 May 2025 14:23) (96% - 100%)    Parameters below as of 23 May 2025 14:23  Patient On (Oxygen Delivery Method): nasal cannula        General : NAD    HEENT:  NC/AT, PERRL, EOMI, sclera clear, mucosa moist, throat clear, trachea midline, neck supple  Cardiovascular: RRR   Respiratory:  equal chest rise  Gastrointestinal : Soft NT/ND (+)BS    Neurology:  Weakened strength & sensation  Psych: calm  Musculoskeletal:  limited   Skin:   Pressure injury       LABS/ CULTURES/ RADIOLOGY:                        11.0   10.69 )-----------( 200      ( 23 May 2025 12:23 )             34.4       148  |  113  |  45  ----------------------------<  67      [05-23-25 @ 04:30]  4.0   |  21  |  1.5        Ca     7.9     [05-23-25 @ 04:30]    TPro  6.9  /  Alb  3.2  /  TBili  0.8  /  DBili  x   /  AST  25  /  ALT  10  /  AlkPhos  108  [05-22-25 @ 20:30]    PT/INR: PT 12.10, INR 1.02       [05-22-25 @ 20:30]  PTT: 29.6       [05-22-25 @ 20:30]

## 2025-05-23 NOTE — CONSULT NOTE ADULT - ASSESSMENT
89F with PMH of dementia, spinal stenosis, CVA (bedbound), and macular degeneration, here with weakness, AMS, and water diarrhea, and found to have diverticulitis vs colitis, started on IV abx. CT head negative. Palliative c/s for GOC.     General Call    Contacts         Type Contact Phone/Fax    12/12/2023 12:51 PM CST Phone (Incoming) Lenore Hansenonna BHARGAVI (Self) [phone] (M)          Reason for Call:  Pt states that she saw Dr. Tovar for her ovarian cysts last June, but she was told that she cannot see Dr. Tovar for 6 mo follow up appt because Dr. Tovar isn't taking new pts.  Pt wants to know if this is something that Tamara Tillman can help her with?  Please call patient and advise.      Date of last appointment with provider: 11/13/23    Could we send this information to you in [facility] or would you prefer to receive a phone call?:   Patient would prefer a phone call   Okay to leave a detailed message?: Yes at Cell number on file:    Telephone Information:   Mobile [phone]        89F with PMH of dementia, spinal stenosis, CVA (bedbound), and macular degeneration, here with weakness, AMS, and water diarrhea, and found to have diverticulitis vs colitis, started on IV abx. CT head negative. Palliative c/s for GOC.    - d/w RN, will d/w daughter Sophia re: GOC  - patient comfortbale, nonverbal at baseline per RN    Full consult to follow shortly. Please call [phone] with any acute concerns.      89F with PMH of dementia, spinal stenosis, CVA (bedbound), and macular degeneration, here with weakness, AMS, and water diarrhea, and found to have diverticulitis vs colitis, started on IV abx. CT head negative. Palliative c/s for GOC.    - d/w RN  - see GOC above, 16 mins spent  - DNR only for now  - patient comfortbale, nonverbal at baseline per RN    Full consult to follow shortly. Please call [phone] with any acute concerns.      89F with PMH of dementia, spinal stenosis, CVA (bedbound), and macular degeneration, here with weakness, AMS, and water diarrhea, and found to have diverticulitis vs colitis, started on IV abx. CT head negative. Palliative c/s for GOC.    - d/w RN, medical attending  - DNR only for now  - patient comfortbale, nonverbal at baseline per RN  - c/w abx for diverticulitis vs colitis  - will follow    ______________  Parag Tim MD  Palliative Medicine     [facility]  [phone]

## 2025-05-23 NOTE — PATIENT PROFILE ADULT - FALL HARM RISK - HARM RISK INTERVENTIONS

## 2025-05-23 NOTE — CONSULT NOTE ADULT - SUBJECTIVE AND OBJECTIVE BOX
ARMANDO SCHMITZ  89y, Female  Allergies    aspirin (Unknown)  penicillin (Unknown)    Intolerances    LOS      HPI  HPI:   89 y/o female presents to the ED with family for increased weakness, AMS,  and watery diarrhea over past few days. Pt  with hx of dementia, spinal stenosis, macular degeneration, cva , bedbound patient with decreased po intake. patient without any cough or congestion. patient with multiple episodes of vomiting without any hematemesis (23 May 2025 02:19)      INFECTIOUS DISEASE HISTORY:  ID consulted for antimicrobial recommendations.     Prior hospital charts reviewed [Yes]  Primary team notes reviewed [Yes]  Other consultant notes reviewed [Yes]    REVIEW OF SYSTEMS: cannot obtain further history from the patient secondary to altered mental status or sedation      PAST MEDICAL & SURGICAL HISTORY:  HTN (hypertension)      CVA (cerebrovascular accident)      Dementia      Chronic atrial fibrillation      History of macular degeneration      Arthritis      Presence of Watchman left atrial appendage closure device      Fracture, tibia and fibula          SOCIAL HISTORY:  - No recent travel    FAMILY HISTORY: No pertinent PMH for first degree relatives.     ANTIMICROBIALS:  meropenem  IVPB 500 every 12 hours      ANTIMICROBIALS (past 90 days):  MEDICATIONS  (STANDING):  cefepime   IVPB   100 mL/Hr IV Intermittent (05-22-25 @ 20:57)    metroNIDAZOLE  IVPB   100 mL/Hr IV Intermittent (05-23-25 @ 02:56)        OTHER MEDS:   MEDICATIONS  (STANDING):  aMIOdarone    Tablet 100 daily  amLODIPine   Tablet 5 daily  citalopram 10 at bedtime  clopidogrel Tablet 75 daily  furosemide    Tablet 20 daily  heparin   Injectable 5000 every 12 hours      VITALS:  Vital Signs Last 24 Hrs  T(F): 97.5 (05-23-25 @ 14:23), Max: 101 (05-22-25 @ 19:58)    Vital Signs Last 24 Hrs  HR: 56 (05-23-25 @ 14:23) (56 - 100)  BP: 113/77 (05-23-25 @ 14:23) (96/56 - 121/63)  RR: 18 (05-23-25 @ 14:23)  SpO2: 99% (05-23-25 @ 14:23) (96% - 100%)  Wt(kg): --    EXAM:  GENERAL: Frail looking.   HEAD: No head lesions, On NC  CHEST/LUNG: Shallow breath sounds.   HEART: S1 S2  ABDOMEN: Soft, nontender  EXTREMITIES: + 1 edema   NERVOUS SYSTEM: Awake. Not interactive.   MSK: No joint erythema, swelling or pain    Labs:                        11.0   10.69 )-----------( 200      ( 23 May 2025 12:23 )             34.4     05-23    148[H]  |  113[H]  |  45[H]  ----------------------------<  67[L]  4.0   |  21  |  1.5    Ca    7.9[L]      23 May 2025 04:30    TPro  6.9  /  Alb  3.2[L]  /  TBili  0.8  /  DBili  x   /  AST  25  /  ALT  10  /  AlkPhos  108  05-22      WBC Trend:  WBC Count: 10.69 (05-23-25 @ 12:23)  WBC Count: 10.78 (05-23-25 @ 04:30)  WBC Count: 14.19 (05-22-25 @ 20:30)          Creatine Trend:  Creatinine: 1.5 (05-23)  Creatinine: 2.0 (05-22)      Liver Biochemical Testing Trend:  Alanine Aminotransferase (ALT/SGPT): 10 (05-22)  Aspartate Aminotransferase (AST/SGOT): 25 (05-22-25 @ 20:30)  Bilirubin Total: 0.8 (05-22)      Trend LDH          Urinalysis Basic - ( 23 May 2025 04:30 )    Color: x / Appearance: x / SG: x / pH: x  Gluc: 67 mg/dL / Ketone: x  / Bili: x / Urobili: x   Blood: x / Protein: x / Nitrite: x   Leuk Esterase: x / RBC: x / WBC x   Sq Epi: x / Non Sq Epi: x / Bacteria: x        MICROBIOLOGY:    Female    Urinalysis with Rflx Culture (collected 22 May 2025 20:10)    Lactate, Blood: 1.8 (05-23 @ 00:06)  Lactate, Blood: 2.5 (05-22 @ 20:30)        INFLAMMATORY MARKERS      RADIOLOGY & ADDITIONAL TESTS:  I have personally reviewed the imagings.  CXR  Xray Chest 1 View- PORTABLE-Urgent:   ACC: [id number] EXAM:  XR CHEST PORTABLE URGENT 1V   ORDERED BY: NADIR ARAMBULA     PROCEDURE DATE:  05/22/2025          INTERPRETATION:  CLINICAL HISTORY: Sepsis.    COMPARISON: July 27, 2023.    TECHNIQUE: Portable frontal chest radiograph. Low lung volume.    FINDINGS:    Support devices: None.    Cardiac/mediastinum/hilum: Stable.    Lung parenchyma/Pleura: No focal parenchymal opacities, pleural   effusions, or pneumothorax.    Skeleton/soft tissues: Stable.      IMPRESSION: Low lung volume.    No radiographic evidence of acute cardiopulmonary disease.    --- End of Report ---            KATRIN ONTIVEROS MD; Attending Radiologist  This document has been electronically signed. May 23 2025  6:19AM (05-22-25 @ 20:24)      CT  CT Abdomen and Pelvis No Cont:   ACC: [id number] EXAM:  CT ABDOMEN AND PELVIS   ORDERED BY: NADIR ARAMBULA     PROCEDURE DATE:  05/22/2025          INTERPRETATION:  CLINICAL STATEMENT: diarrhea, fever    TECHNIQUE: Contiguous axial CT images were obtained from the lower chest   to the pubic symphysis without intravenous contrast.  Oral contrast was   administered.  Reformatted images in the coronal and sagittal planes were   acquired.    COMPARISON CT: CT abdomen pelvis 7/27/2023.    Note that the evaluation of solid organs andbowel loops is limited   secondary to lack of oral and IV contrast.    FINDINGS:    LOWER CHEST: Small right pleural effusion. Watchman device noted.    HEPATOBILIARY: Hepatic cysts. Cholelithiasis..    SPLEEN: Unremarkable.    PANCREAS: Atrophic..    ADRENAL GLANDS: Unremarkable.    KIDNEYS: No hydronephrosis. Non obstructing right renal calculi.    ABDOMINOPELVIC NODES: Unremarkable.    PELVIC ORGANS: Post hysterectomy. Flexor and edema surrounding the   urinary bladder.    PERITONEUM/MESENTERY/BOWEL: Colonic diverticulosis. Presacral edema and   trace fluid. Fat stranding noted in the pelvis surrounding the urinary   bladder and rectosigmoid colon. Under distended sigmoid colon. No   intra-abdominal abscess. No pneumoperitoneum, or pneumatosis. No bowel   obstruction.    BONES/SOFT TISSUES: No acute osseous abnormality. Osteopenia with   degenerative changes. Anasarca. Subcutaneous edema and skin thickening   posterior to the sacrum. Fat-containing ventral hernia measuring 2.5 cm.    OTHER: Normal caliber aorta.      IMPRESSION:    Fat stranding and fluid mesenteric edema noted in the pelvis surrounding   the rectosigmoid bowel loops. Mild rectosigmoid wall thickening though   the signal colon is decompressed. Findings may reflect colitis.    Nonspecific urinary bladder wall thickening and surrounding fat   stranding. Correlate with urinalysis for cystitis.    Subcutaneous edema and skin thickening posterior to the sacrum. Correlate   for ulcer.    Additional findings detailed above.    --- End of Report ---  SINA TALLEY MD; Resident Radiologist  This document has been electronically signed.  GARO TSANG MD; Attending Radiologist  This document has been electronically signed. May 23 2025  2:36AM (05-22-25 @ 23:47)      CARDIOLOGY TESTING  12 Lead ECG:   Ventricular Rate 94 BPM    Atrial Rate 94 BPM    QRS Duration 104 ms    Q-T Interval 362 ms    QTC Calculation(Bazett) 452 ms    P Axis 57 degrees    R Axis -56 degrees    T Axis 126 degrees    Diagnosis Line Sinus rhythm with Premature atrial complexes  Left axis deviation    Anterolateral infarct , age undetermined  Abnormal ECG    Confirmed by TATY KAY MD ([id number]) on 5/23/2025 7:04:36 AM (05-22-25 @ 19:58)

## 2025-05-23 NOTE — H&P ADULT - NSICDXPASTMEDICALHX_GEN_ALL_CORE_FT
Start Haldol trial. PAST MEDICAL HISTORY:  Arthritis     Chronic atrial fibrillation     CVA (cerebrovascular accident)     Dementia     History of macular degeneration     HTN (hypertension)

## 2025-05-23 NOTE — CONSULT NOTE ADULT - SUBJECTIVE AND OBJECTIVE BOX
HPI: 89F with PMH of dementia, spinal stenosis, CVA (bedbound), and macular degeneration, here with weakness, AMS, and water diarrhea, and found to have diverticulitis vs colitis, started on IV abx. CT head negative. Palliative c/s for GOC.      PERTINENT PM/SXH:   HTN (hypertension)    CVA (cerebrovascular accident)    Dementia    Chronic atrial fibrillation    History of macular degeneration    Arthritis      Presence of Watchman left atrial appendage closure device    Fracture, tibia and fibula      FAMILY HISTORY:  no pertinent family history      SOCIAL HISTORY:   Significant other/partner[ ]  Children[ X]  Yarsanism/Spirituality:  Substance hx:  [ ]   Tobacco hx:  [ ]   Alcohol hx: [ ]   Living Situation: [X ]Home  [ ]Long term care  [ ]Rehab [ ]Other  Home Services: [ ] HHA [ ] Visting RN [ ] Hospice  Occupation:  Home Opioid hx:  [ ] Y [X ] N [X ] I-Stop Reference No: [id number]    ADVANCE DIRECTIVES:    [ ] Full Code [ ] DNR  MOLST  [ ]  Living Will  [ ]   DECISION MAKER(s):  [ ] Health Care Proxy(s)  [ ] Surrogate(s)  [ ] Guardian           Name(s): Phone Number(s): racquel Cortes [phone]    BASELINE (I)ADL(s) (prior to admission):  [place]: [ ]Total  [ ] Moderate [ ]Dependent  Palliative Performance Status Version 2:         %    http://npcrc.org/files/news/palliative_performance_scale_ppsv2.pdf    Allergies    aspirin (Unknown)  penicillin (Unknown)    Intolerances    MEDICATIONS  (STANDING):  aMIOdarone    Tablet 100 milliGRAM(s) Oral daily  amLODIPine   Tablet 5 milliGRAM(s) Oral daily  cefepime   IVPB 500 milliGRAM(s) IV Intermittent daily  chlorhexidine 2% Cloths 1 Application(s) Topical <User Schedule>  citalopram 10 milliGRAM(s) Oral at bedtime  clopidogrel Tablet 75 milliGRAM(s) Oral daily  folic acid 1 milliGRAM(s) Oral at bedtime  furosemide    Tablet 20 milliGRAM(s) Oral daily  heparin   Injectable 5000 Unit(s) SubCutaneous every 12 hours  metroNIDAZOLE  IVPB 500 milliGRAM(s) IV Intermittent every 12 hours  metroNIDAZOLE  IVPB      metroNIDAZOLE  IVPB 500 milliGRAM(s) IV Intermittent once  sodium chloride 0.9%. 1000 milliLiter(s) (50 mL/Hr) IV Continuous <Continuous>    MEDICATIONS  (PRN):      PRESENT SYMPTOMS: [ ]Unable to obtain due to poor mentation   Source if other than patient:  [ ]Family   [ ]Team   [ X]All review of systems negative including pain and dyspnea unless noted below    All components of pain assessment below addressed. Blank spaces indicate that the patient did/could not complete the assessment.  Pain: [ ]yes [ ]no  QOL impact -   Location -                    Aggravating factors -  Quality -  Radiation -  Timing-  Severity (0-10 scale):  Minimal acceptable level (0-10 scale):     CPOT:    https://www.scc.org/getattachment/pca59g22-0p5n-9b5z-1b1t-6907r8624p7t/Critical-Care-Pain-Observation-Tool-(CPOT)    PAIN AD Score:   http://geriatrictoolkit.[facility]/cog/painad.pdf (press ctrl +  left click to view)    Dyspnea:                           [ ]None[ ]Mild [ ]Moderate [ ]Severe     Respiratory Distress Observation Scale (RDOS):   A score of 0 to 2 signifies little or no respiratory distress, 3 signifies mild distress, scores 4 to 6 indicate moderate distress, and scores greater than 7 signify severe distress  https://[email or web address].ca/sites/default/files/PDFS/333477-pfnmyxuzrka-efzevzfl-jtgusemyasx-sdklo.pdf    Anxiety:                             [ ]None[ ]Mild [ ]Moderate [ ]Severe   Fatigue:                             [ ]None[ ]Mild [ ]Moderate [ ]Severe   Nausea:                             [ ]None[ ]Mild [ ]Moderate [ ]Severe   Loss of appetite:              [ ]None[ ]Mild [ ]Moderate [ ]Severe   Constipation:                    [ ]None[ ]Mild [ ]Moderate [ ]Severe    Other Symptoms:    PHYSICAL EXAM:  Vital Signs Last 24 Hrs  T(C): 36.9 (23 May 2025 03:52), Max: 38.3 (22 May 2025 19:58)  T(F): 98.4 (23 May 2025 03:52), Max: 101 (22 May 2025 19:58)  HR: 65 (23 May 2025 03:52) (65 - 100)  BP: 104/63 (23 May 2025 03:52) (96/56 - 121/63)  BP(mean): --  RR: 18 (23 May 2025 03:52) (18 - 18)  SpO2: 100% (23 May 2025 03:52) (96% - 100%)    Parameters below as of 23 May 2025 03:52  Patient On (Oxygen Delivery Method): nasal cannula  O2 Flow (L/min): 2   I&O's Summary    22 May 2025 07:01  -  23 May 2025 07:00  --------------------------------------------------------  IN: 0 mL / OUT: 300 mL / NET: -300 mL        GENERAL:  [X ] No acute distress [ ]Lethargic  [ ]Unarousable  [ ]Verbal  [ ]Non-Verbal [ ]Cachexia    BEHAVIORAL/PSYCH:  [ ]Alert and Oriented x  [ ] Anxiety [ ] Delirium [ ] Agitation [X ] Calm   EYES: [ ] No scleral icterus [ ] Scleral icterus [ ] Closed  ENMT:  [ ]Dry mouth  [ ]No external oral lesions [ X] No external ear or nose lesions  CARDIOVASCULAR:  [ ]Regular [ ]Irregular [ ]Tachy [ ]Not Tachy  [ ]Vaughn [ ] Edema [ ] No edema  PULMONARY:  [ ]Tachypnea  [ ]Audible excessive secretions [X ] No labored breathing [ ] labored breathing  GASTROINTESTINAL: [ ]Soft  [ ]Distended  [ X]Not distended [ ]Non tender [ ]Tender  MUSCULOSKELETAL: [ ]No clubbing [ ] clubbing  [ X] No cyanosis [ ] cyanosis  NEUROLOGIC: [ ]No focal deficits  [ ]Follows commands  [ ]Does not follow commands  [ ]Cognitive impairment  [ ]Dysphagia  [ ]Dysarthria  [ ]Paresis   SKIN: [ ] Jaundiced [X ] Non-jaundiced [ ]Rash [ ]No Rash [ ] Warm [ ] Dry  MISC/LINES: [ ] ET tube [ ] Trach [ ]NGT/OGT [ ]PEG [ ]Rider    CRITICAL CARE:  [ ] Shock Present  [ ]Septic [ ]Cardiogenic [ ]Neurologic [ ]Hypovolemic  [ ]  Vasopressors [ ]  Inotropes   [ ]Respiratory failure present [ ]Mechanical ventilation [ ]Non-invasive ventilatory support [ ]High flow  [ ]Acute  [ ]Chronic [ ]Hypoxic  [ ]Hypercarbic [ ]Other  [ ]Other organ failure     LABS: reviewed by me, notable for: elevated WBC, SCr, UA WNL                        14.7   14.19 )-----------( 247      ( 22 May 2025 20:30 )             45.5   05-22    143  |  105  |  48[H]  ----------------------------<  125[H]  5.9[H]   |  24  |  2.0[H]    Ca    8.9      22 May 2025 20:30    TPro  6.9  /  Alb  3.2[L]  /  TBili  0.8  /  DBili  x   /  AST  25  /  ALT  10  /  AlkPhos  108  05-22  PT/INR - ( 22 May 2025 20:30 )   PT: 12.10 sec;   INR: 1.02 ratio         PTT - ( 22 May 2025 20:30 )  PTT:29.6 sec    Urinalysis Basic - ( 22 May 2025 20:30 )    Color: x / Appearance: x / SG: x / pH: x  Gluc: 125 mg/dL / Ketone: x  / Bili: x / Urobili: x   Blood: x / Protein: x / Nitrite: x   Leuk Esterase: x / RBC: x / WBC x   Sq Epi: x / Non Sq Epi: x / Bacteria: x      RADIOLOGY & ADDITIONAL STUDIES: CXR personally reviewed by me: 5/29: low lung volumes    PROTEIN CALORIE MALNUTRITION PRESENT: [ ]mild [ ]moderate [ ]severe [ ]underweight [ ]morbid obesity  https://www.andeal.org/vault/9570/web/files/ONC/Table_Clinical%20Characteristics%20to%20Document%20Malnutrition-White%20JV%20et%20al%202012.pdf      Weight (kg): 49.9 (05-22-25 @ 18:29)    [ ]PPSV2 < or = to 30% [ ]significant weight loss  [ ]poor nutritional intake  [ ]anasarca      [ ]Artificial Nutrition          Palliative Care Spiritual/Emotional Screening Tool Question  Severity (0-4):                    OR                    [X ] Unable to determine/NA  Score of 2 or greater indicates recommendation of Chaplaincy referral  Chaplaincy Referral: [ ] Yes [ ] Refused [ ] Following     Caregiver [place]:  [ ] Yes [ ] No    OR    [x ] Unable to determine. Will assess at later time if appropriate.  Social Work Referral [ ]  Patient and Family Centered Care Referral [ ]    Anticipatory Grief Present: [ ] Yes [ ] No    OR     [ x] Unable to determine. Will assess at later time if appropriate.  Social Work Referral [ ]  Patient and Family Centered Care Referral [ ]    REFERRALS:   [ ]Chaplaincy  [ ]Hospice  [ ]Child Life  [ ]Social Work  [ ]Case management [ ]Holistic Therapy     Palliative care education provided to patient and/or family    Goals of Care Document:     ______________  Parag Tim MD  Palliative Medicine  [facility]   of Geriatric and Palliative Medicine  [phone]   HPI: 89F with PMH of dementia, spinal stenosis, CVA (bedbound), and macular degeneration, here with weakness, AMS, and water diarrhea, and found to have diverticulitis vs colitis, started on IV abx. CT head negative. Palliative c/s for GOC.      PERTINENT PM/SXH:   HTN (hypertension)    CVA (cerebrovascular accident)    Dementia    Chronic atrial fibrillation    History of macular degeneration    Arthritis      Presence of Watchman left atrial appendage closure device    Fracture, tibia and fibula      FAMILY HISTORY:  no pertinent family history      SOCIAL HISTORY:   Significant other/partner[ ]  Children[ X]  Latter day/Spirituality:  Substance hx:  [ ]   Tobacco hx:  [ ]   Alcohol hx: [ ]   Living Situation: [X ]Home  [ ]Long term care  [ ]Rehab [ ]Other  Home Services: [ ] HHA [ ] Visting RN [ ] Hospice  Occupation:  Home Opioid hx:  [ ] Y [X ] N [X ] I-Stop Reference No: [id number]    ADVANCE DIRECTIVES:    [ ] Full Code [X ] DNR  MOLST  [ ]  Living Will  [ ]   DECISION MAKER(s):  [ ] Health Care Proxy(s)  [ ] Surrogate(s)  [ ] Guardian           Name(s): Phone Number(s): racquel Cortes [phone]    BASELINE (I)ADL(s) (prior to admission):  [place]: [ ]Total  [ ] Moderate [ ]Dependent  Palliative Performance Status Version 2:         %    http://npcrc.org/files/news/palliative_performance_scale_ppsv2.pdf    Allergies    aspirin (Unknown)  penicillin (Unknown)    Intolerances    MEDICATIONS  (STANDING):  aMIOdarone    Tablet 100 milliGRAM(s) Oral daily  amLODIPine   Tablet 5 milliGRAM(s) Oral daily  cefepime   IVPB 500 milliGRAM(s) IV Intermittent daily  chlorhexidine 2% Cloths 1 Application(s) Topical <User Schedule>  citalopram 10 milliGRAM(s) Oral at bedtime  clopidogrel Tablet 75 milliGRAM(s) Oral daily  folic acid 1 milliGRAM(s) Oral at bedtime  furosemide    Tablet 20 milliGRAM(s) Oral daily  heparin   Injectable 5000 Unit(s) SubCutaneous every 12 hours  metroNIDAZOLE  IVPB 500 milliGRAM(s) IV Intermittent every 12 hours  metroNIDAZOLE  IVPB      metroNIDAZOLE  IVPB 500 milliGRAM(s) IV Intermittent once  sodium chloride 0.9%. 1000 milliLiter(s) (50 mL/Hr) IV Continuous <Continuous>    MEDICATIONS  (PRN):      PRESENT SYMPTOMS: [X ]Unable to obtain due to poor mentation   Source if other than patient:  [ ]Family   [ ]Team   [ X]All review of systems negative including pain and dyspnea unless noted below    All components of pain assessment below addressed. Blank spaces indicate that the patient did/could not complete the assessment.  Pain: [ ]yes [ ]no  QOL impact -   Location -                    Aggravating factors -  Quality -  Radiation -  Timing-  Severity (0-10 scale):  Minimal acceptable level (0-10 scale):     CPOT:    https://www.[facility].org/getattachment/[email or web address]-1a3t-6x0m-2436p6882f8s/Critical-Care-Pain-Observation-Tool-(CPOT)    PAIN AD Score: 0  http://geriatrictoolkit.[address] [place] [address]/cog/painad.pdf (press ctrl +  left click to view)    Dyspnea:                           [ ]None[ ]Mild [ ]Moderate [ ]Severe     Respiratory Distress Observation Scale (RDOS): 0  A score of 0 to 2 signifies little or no respiratory distress, 3 signifies mild distress, scores 4 to 6 indicate moderate distress, and scores greater than 7 signify severe distress  https://www.[facility].ca/sites/default/files/PDFS/360443-sqxovheydnf-wfkywtdc-nticdnhasby-plpkl.pdf    Anxiety:                             [ ]None[ ]Mild [ ]Moderate [ ]Severe   Fatigue:                             [ ]None[ ]Mild [ ]Moderate [ ]Severe   Nausea:                             [ ]None[ ]Mild [ ]Moderate [ ]Severe   Loss of appetite:              [ ]None[ ]Mild [ ]Moderate [ ]Severe   Constipation:                    [ ]None[ ]Mild [ ]Moderate [ ]Severe    Other Symptoms:    PHYSICAL EXAM:  Vital Signs Last 24 Hrs  T(C): 36.9 (23 May 2025 03:52), Max: 38.3 (22 May 2025 19:58)  T(F): 98.4 (23 May 2025 03:52), Max: 101 (22 May 2025 19:58)  HR: 65 (23 May 2025 03:52) (65 - 100)  BP: 104/63 (23 May 2025 03:52) (96/56 - 121/63)  BP(mean): --  RR: 18 (23 May 2025 03:52) (18 - 18)  SpO2: 100% (23 May 2025 03:52) (96% - 100%)    Parameters below as of 23 May 2025 03:52  Patient On (Oxygen Delivery Method): nasal cannula  O2 Flow (L/min): 2   I&O's Summary    22 May 2025 07:01  -  23 May 2025 07:00  --------------------------------------------------------  IN: 0 mL / OUT: 300 mL / NET: -300 mL        GENERAL:  [X ] No acute distress [ ]Lethargic  [ ]Unarousable  [ ]Verbal  [ ]Non-Verbal [ ]Cachexia    BEHAVIORAL/PSYCH:  [ ]Alert and Oriented x  [ ] Anxiety [ ] Delirium [ ] Agitation [X ] Calm   EYES: [ ] No scleral icterus [ ] Scleral icterus [ ] Closed  ENMT:  [ ]Dry mouth  [ ]No external oral lesions [ X] No external ear or nose lesions  CARDIOVASCULAR:  [ ]Regular [ ]Irregular [ ]Tachy [ ]Not Tachy  [ ]Vaughn [ ] Edema [ ] No edema  PULMONARY:  [ ]Tachypnea  [ ]Audible excessive secretions [X ] No labored breathing [ ] labored breathing  GASTROINTESTINAL: [ ]Soft  [ ]Distended  [ X]Not distended [ ]Non tender [ ]Tender  MUSCULOSKELETAL: [ ]No clubbing [ ] clubbing  [ X] No cyanosis [ ] cyanosis  NEUROLOGIC: [ ]No focal deficits  [ ]Follows commands  [ ]Does not follow commands  [ ]Cognitive impairment  [ ]Dysphagia  [ ]Dysarthria  [ ]Paresis   SKIN: [ ] Jaundiced [X ] Non-jaundiced [ ]Rash [ ]No Rash [ ] Warm [ ] Dry  MISC/LINES: [ ] ET tube [ ] Trach [ ]NGT/OGT [ ]PEG [ ]Rider    CRITICAL CARE:  [ ] Shock Present  [ ]Septic [ ]Cardiogenic [ ]Neurologic [ ]Hypovolemic  [ ]  Vasopressors [ ]  Inotropes   [ ]Respiratory failure present [ ]Mechanical ventilation [ ]Non-invasive ventilatory support [ ]High flow  [ ]Acute  [ ]Chronic [ ]Hypoxic  [ ]Hypercarbic [ ]Other  [ ]Other organ failure     LABS: reviewed by me, notable for: elevated WBC, SCr, UA WNL                        14.7   14.19 )-----------( 247      ( 22 May 2025 20:30 )             45.5   05-22    143  |  105  |  48[H]  ----------------------------<  125[H]  5.9[H]   |  24  |  2.0[H]    Ca    8.9      22 May 2025 20:30    TPro  6.9  /  Alb  3.2[L]  /  TBili  0.8  /  DBili  x   /  AST  25  /  ALT  10  /  AlkPhos  108  05-22  PT/INR - ( 22 May 2025 20:30 )   PT: 12.10 sec;   INR: 1.02 ratio         PTT - ( 22 May 2025 20:30 )  PTT:29.6 sec    Urinalysis Basic - ( 22 May 2025 20:30 )    Color: x / Appearance: x / SG: x / pH: x  Gluc: 125 mg/dL / Ketone: x  / Bili: x / Urobili: x   Blood: x / Protein: x / Nitrite: x   Leuk Esterase: x / RBC: x / WBC x   Sq Epi: x / Non Sq Epi: x / Bacteria: x      RADIOLOGY & ADDITIONAL STUDIES: CXR personally reviewed by me: 5/29: low lung volumes    PROTEIN CALORIE MALNUTRITION PRESENT: [ ]mild [ ]moderate [ ]severe [ ]underweight [ ]morbid obesity  https://www.andeal.org/vault/9270/web/files/ONC/Table_Clinical%20Characteristics%20to%20Document%20Malnutrition-White%20JV%20et%20al%202012.pdf      Weight (kg): 49.9 (05-22-25 @ 18:29)    [ ]PPSV2 < or = to 30% [ ]significant weight loss  [ ]poor nutritional intake  [ ]anasarca      [ ]Artificial Nutrition          Palliative Care Spiritual/Emotional Screening Tool Question  Severity (0-4):                    OR                    [X ] Unable to determine/NA  Score of 2 or greater indicates recommendation of Chaplaincy referral  Chaplaincy Referral: [ ] Yes [ ] Refused [ ] Following     Caregiver [place]:  [ ] Yes [ ] No    OR    [x ] Unable to determine. Will assess at later time if appropriate.  Social Work Referral [ ]  Patient and Family Centered Care Referral [ ]    Anticipatory Grief Present: [ ] Yes [ ] No    OR     [ x] Unable to determine. Will assess at later time if appropriate.  Social Work Referral [ ]  Patient and Family Centered Care Referral [ ]    REFERRALS:   [ ]Chaplaincy  [ ]Hospice  [ ]Child Life  [ ]Social Work  [ ]Case management [ ]Holistic Therapy     Palliative care education provided to patient and/or family    Goals of Care Document:     ______________  Parag Tim MD  Palliative Medicine  [facility]   of Geriatric and Palliative Medicine  [phone]

## 2025-05-23 NOTE — CONSULT NOTE ADULT - NS ATTEND AMEND GEN_ALL_CORE FT
Patient seen and examined during the GI rounds, case discussed with the GI PA, plan communicated to the primary team, assessment, recommendations and plan as above. Reviewed all pertinent clinical information and reviewed all relevant imaging and diagnostic studies.  Counseled the patient /HCP about diagnostic testing and treatment plan. All questions were answered.  Discussed recommendations with the primary team and coordinated care.

## 2025-05-23 NOTE — CONSULT NOTE ADULT - ASSESSMENT
High risk for pressure injury development or progression   Skin assessed- b/l heel intact blanchable  redness                         Moisture associated dermatitis to sacrococcygeal   Wound #1  Type and location :  ?? deep tissue pressure injury to sacrococcygeal   Size: 3x3  Tissue Description : Intact dark red skin tissue    No odor, erythema, increased warmth, tenderness, induration, fluctuance, nor crepitus  Wound Exudate : None    Wound Edge:  Intact   Periwound Condition : scar tissue from previously healed ulcer         Wound and skin care recs.   Clean skin with soap and water , pat dry apply triad hydrophilic dressing - monitor for  progression   Pressure  injury  preventive  measures  Skin  and incontinence care   Assess wound and inform primary provider of any changes   Case discussed with primary Rn  Wound/ ostomy specialist  to f/u as needed     Offloading: [x ] Frequent position changes [ ] Devices/Equipment  Cleansing: [ ] Saline [x ] Soap/Water [ ] Other: ______  Topicals: [x ] Barrier Cream [ ] Antimicrobial [ ] Enzymatic Wound Debridement [x] Moist  wound Healing   Dressings: [ ] Dry, sterile [ ] Allevyn  Foam [ ] Absorbant Pads [ ] Collagenase    Other Recs.   Per Primary team   Total time for bedside assessment  , review of medical records  and  discussion of plan of care with primary team greater than 35 min

## 2025-05-23 NOTE — CONSULT NOTE ADULT - ASSESSMENT
89yFemale pmh HTN, Dementia presents for weakness and ams as well as diarrhea 4-5 episodes the past few days.  No hematemesis, melena, blood in stool reported. diarrhea appears to have resolved now per nursing team.      #Colitis on CT, had diarrhea which resolved now  #Fat stranding and fluid mesenteric edema noted in the pelvis surrounding   the rectosigmoid bowel loops. Mild rectosigmoid wall thickening though   the signal colon is decompressed. Findings may reflect colitis.  Rec  -Check C-diff and GI PCR if diarrhea recurs  -monitor stools for frequency, consistency, blood  -GOC conversation by primary team  - Follow up with our [facility] located at [address] [place] [address]. Phone Number: [phone]       #Cholelithiasis asymptomatic   Rec  -watchful waiting      #Nonspecific urinary bladder wall thickening and surrounding fat   stranding. Correlate with urinalysis for cystitis.  Rec  - Care as per primary team     #Subcutaneous edema and skin thickening posterior to the sacrum. Correlate   for ulcer  Rec  - Care as per primary team  89yFemale pmh HTN, Dementia presents for weakness and ams as well as diarrhea 4-5 episodes the past few days.  No hematemesis, melena, blood in stool reported. diarrhea appears to have resolved now per nursing team.      #Colitis on CT, had diarrhea which resolved now  #Fat stranding and fluid mesenteric edema noted in the pelvis surrounding   the rectosigmoid bowel loops. Mild rectosigmoid wall thickening though   the signal colon is decompressed. Findings may reflect colitis.  Rec  -Check C-diff and GI PCR if diarrhea recurs  -monitor stools for frequency, consistency, blood  -GOC conversation by primary team  - Follow up with our [facility] located at [address] [place] [address]. Phone Number: [phone]   -spoke with family they do not want any invasive intervention, want conservative managment  -diet as tolerated      #Cholelithiasis asymptomatic   Rec  -watchful waiting      #Nonspecific urinary bladder wall thickening and surrounding fat   stranding. Correlate with urinalysis for cystitis.  Rec  - Care as per primary team     #Subcutaneous edema and skin thickening posterior to the sacrum. Correlate   for ulcer  Rec  - Care as per primary team     Recall GI if needed

## 2025-05-23 NOTE — H&P ADULT - NSHPLABSRESULTS_GEN_ALL_CORE
ct abdo/ pelvis:  ABDOMINOPELVIC NODES: Unremarkable.    PELVIC ORGANS: Bladder is underdistended limiting evaluation. Unchanged   bladder calculi. Hysterectomy.    PERITONEUM/MESENTERY/BOWEL: Sigmoid diverticulosis with small ascites and   mesenteric edema in the lower abdomen/pelvis. Hiatal hernia. No bowel   obstruction, pneumatosis, pneumoperitoneum.  Normal appendix.    BONES/SOFT TISSUES: No acute osseous abnormality. Osteopenia with   degenerative changes. Anasarca. Subcutaneous edema and skin thickening   posterior to the sacrum. Fat-containing ventral hernia measuring 2.5 cm.    OTHER: Normal caliber aorta.      IMPRESSION:    Sigmoid diverticulosis with small ascites and mesenteric edema in the   lower abdomen/pelvis. Findings may reflect diverticulitis versus colitis   in theappropriate clinical setting. Note that the evaluation of solid   organs and bowel loops is limited secondary to lack of oral and IV   contrast.    Subcutaneous edema and skin thickening posterior to the sacrum. Correlate   for ulcer.                          14.7   14.19 )-----------( 247      ( 22 May 2025 20:30 )             45.5       05-22    143  |  105  |  48[H]  ----------------------------<  125[H]  5.9[H]   |  24  |  2.0[H]    Ca    8.9      22 May 2025 20:30    TPro  6.9  /  Alb  3.2[L]  /  TBili  0.8  /  DBili  x   /  AST  25  /  ALT  10  /  AlkPhos  108  05-22              Urinalysis Basic - ( 22 May 2025 20:30 )    Color: x / Appearance: x / SG: x / pH: x  Gluc: 125 mg/dL / Ketone: x  / Bili: x / Urobili: x   Blood: x / Protein: x / Nitrite: x   Leuk Esterase: x / RBC: x / WBC x   Sq Epi: x / Non Sq Epi: x / Bacteria: x        PT/INR - ( 22 May 2025 20:30 )   PT: 12.10 sec;   INR: 1.02 ratio         PTT - ( 22 May 2025 20:30 )  PTT:29.6 sec    Lactate Trend  05-23 @ 00:06 Lactate:1.8   05-22 @ 20:30 Lactate:2.5             CAPILLARY BLOOD GLUCOSE

## 2025-05-23 NOTE — SWALLOW BEDSIDE ASSESSMENT ADULT - SLP GENERAL OBSERVATIONS
Pt. received in bed awake. Verbally communicate the word "yes" when asked if she wanted water. Leaning towards the right.

## 2025-05-24 LAB
ANION GAP SERPL CALC-SCNC: 12 MMOL/L — SIGNIFICANT CHANGE UP (ref 7–14)
BASOPHILS # BLD AUTO: 0.02 K/UL — SIGNIFICANT CHANGE UP (ref 0–0.2)
BASOPHILS NFR BLD AUTO: 0.3 % — SIGNIFICANT CHANGE UP (ref 0–1)
BUN SERPL-MCNC: 32 MG/DL — HIGH (ref 10–20)
CALCIUM SERPL-MCNC: 8.2 MG/DL — LOW (ref 8.4–10.5)
CHLORIDE SERPL-SCNC: 113 MMOL/L — HIGH (ref 98–110)
CO2 SERPL-SCNC: 22 MMOL/L — SIGNIFICANT CHANGE UP (ref 17–32)
CREAT SERPL-MCNC: 1 MG/DL — SIGNIFICANT CHANGE UP (ref 0.7–1.5)
EGFR: 52 ML/MIN/1.73M2 — LOW
EGFR: 52 ML/MIN/1.73M2 — LOW
EOSINOPHIL # BLD AUTO: 0.09 K/UL — SIGNIFICANT CHANGE UP (ref 0–0.7)
EOSINOPHIL NFR BLD AUTO: 1.3 % — SIGNIFICANT CHANGE UP (ref 0–8)
GLUCOSE SERPL-MCNC: 77 MG/DL — SIGNIFICANT CHANGE UP (ref 70–99)
HCT VFR BLD CALC: 34 % — LOW (ref 37–47)
HGB BLD-MCNC: 10.8 G/DL — LOW (ref 12–16)
IMM GRANULOCYTES NFR BLD AUTO: 0.3 % — SIGNIFICANT CHANGE UP (ref 0.1–0.3)
LYMPHOCYTES # BLD AUTO: 0.8 K/UL — LOW (ref 1.2–3.4)
LYMPHOCYTES # BLD AUTO: 11.5 % — LOW (ref 20.5–51.1)
MAGNESIUM SERPL-MCNC: 2 MG/DL — SIGNIFICANT CHANGE UP (ref 1.8–2.4)
MCHC RBC-ENTMCNC: 29 PG — SIGNIFICANT CHANGE UP (ref 27–31)
MCHC RBC-ENTMCNC: 31.8 G/DL — LOW (ref 32–37)
MCV RBC AUTO: 91.2 FL — SIGNIFICANT CHANGE UP (ref 81–99)
MONOCYTES # BLD AUTO: 0.36 K/UL — SIGNIFICANT CHANGE UP (ref 0.1–0.6)
MONOCYTES NFR BLD AUTO: 5.2 % — SIGNIFICANT CHANGE UP (ref 1.7–9.3)
NEUTROPHILS # BLD AUTO: 5.64 K/UL — SIGNIFICANT CHANGE UP (ref 1.4–6.5)
NEUTROPHILS NFR BLD AUTO: 81.4 % — HIGH (ref 42.2–75.2)
NRBC BLD AUTO-RTO: 0 /100 WBCS — SIGNIFICANT CHANGE UP (ref 0–0)
PLATELET # BLD AUTO: 195 K/UL — SIGNIFICANT CHANGE UP (ref 130–400)
PMV BLD: 10 FL — SIGNIFICANT CHANGE UP (ref 7.4–10.4)
POTASSIUM SERPL-MCNC: 3.4 MMOL/L — LOW (ref 3.5–5)
POTASSIUM SERPL-SCNC: 3.4 MMOL/L — LOW (ref 3.5–5)
RBC # BLD: 3.73 M/UL — LOW (ref 4.2–5.4)
RBC # FLD: 14.3 % — SIGNIFICANT CHANGE UP (ref 11.5–14.5)
SODIUM SERPL-SCNC: 147 MMOL/L — HIGH (ref 135–146)
WBC # BLD: 6.93 K/UL — SIGNIFICANT CHANGE UP (ref 4.8–10.8)
WBC # FLD AUTO: 6.93 K/UL — SIGNIFICANT CHANGE UP (ref 4.8–10.8)

## 2025-05-24 PROCEDURE — 99233 SBSQ HOSP IP/OBS HIGH 50: CPT

## 2025-05-24 RX ADMIN — CITALOPRAM 10 MILLIGRAM(S): 20 TABLET ORAL at 21:03

## 2025-05-24 RX ADMIN — MEROPENEM 100 MILLIGRAM(S): 1 INJECTION INTRAVENOUS at 05:22

## 2025-05-24 RX ADMIN — HEPARIN SODIUM 5000 UNIT(S): 1000 INJECTION INTRAVENOUS; SUBCUTANEOUS at 17:20

## 2025-05-24 RX ADMIN — SODIUM CHLORIDE 75 MILLILITER(S): 9 INJECTION, SOLUTION INTRAVENOUS at 21:03

## 2025-05-24 RX ADMIN — CLOPIDOGREL BISULFATE 75 MILLIGRAM(S): 75 TABLET, FILM COATED ORAL at 11:01

## 2025-05-24 RX ADMIN — Medication 50 MILLIEQUIVALENT(S): at 11:17

## 2025-05-24 RX ADMIN — MEROPENEM 100 MILLIGRAM(S): 1 INJECTION INTRAVENOUS at 17:19

## 2025-05-24 RX ADMIN — FOLIC ACID 1 MILLIGRAM(S): 1 TABLET ORAL at 21:03

## 2025-05-24 RX ADMIN — FUROSEMIDE 20 MILLIGRAM(S): 10 INJECTION INTRAMUSCULAR; INTRAVENOUS at 05:23

## 2025-05-24 RX ADMIN — Medication 1 APPLICATION(S): at 05:23

## 2025-05-24 RX ADMIN — AMIODARONE HYDROCHLORIDE 100 MILLIGRAM(S): 50 INJECTION, SOLUTION INTRAVENOUS at 05:23

## 2025-05-24 RX ADMIN — HEPARIN SODIUM 5000 UNIT(S): 1000 INJECTION INTRAVENOUS; SUBCUTANEOUS at 05:23

## 2025-05-24 RX ADMIN — SODIUM CHLORIDE 75 MILLILITER(S): 9 INJECTION, SOLUTION INTRAVENOUS at 11:03

## 2025-05-24 NOTE — DIETITIAN INITIAL EVALUATION ADULT - PERTINENT LABORATORY DATA
05-24    147[H]  |  113[H]  |  32[H]  ----------------------------<  77  3.4[L]   |  22  |  1.0    Ca    8.2[L]      24 May 2025 06:32  Mg     2.0     05-24    TPro  6.9  /  Alb  3.2[L]  /  TBili  0.8  /  DBili  x   /  AST  25  /  ALT  10  /  AlkPhos  108  05-22                          10.8   6.93  )-----------( 195      ( 24 May 2025 06:32 )             34.0

## 2025-05-24 NOTE — DIETITIAN INITIAL EVALUATION ADULT - PERTINENT MEDS FT
MEDICATIONS  (STANDING):  aMIOdarone    Tablet 100 milliGRAM(s) Oral daily  chlorhexidine 2% Cloths 1 Application(s) Topical <User Schedule>  citalopram 10 milliGRAM(s) Oral at bedtime  clopidogrel Tablet 75 milliGRAM(s) Oral daily  dextrose 5%. 1000 milliLiter(s) (75 mL/Hr) IV Continuous <Continuous>  folic acid 1 milliGRAM(s) Oral at bedtime  furosemide    Tablet 20 milliGRAM(s) Oral daily  heparin   Injectable 5000 Unit(s) SubCutaneous every 12 hours  meropenem  IVPB 500 milliGRAM(s) IV Intermittent every 12 hours    MEDICATIONS  (PRN):

## 2025-05-24 NOTE — DIETITIAN INITIAL EVALUATION ADULT - ORAL INTAKE PTA/DIET HISTORY
as per daughter via phone pt consumed a puree diet PTA with variable po intake, occasional  milkshakes with ensure. NKFA or intolerances noted. Daughter is unsure of weight changes as pt has not been weighed. As per EMR review pt weighed 74.7 kgs in July of 2023 vs 49.9 kgs today.    presently on a puree diet with mildly thick fluids as per SLP eval tolerating well consumes 25-50% of meals. As per daughter pt enjoys ice cream and receptive to magic cup with meals

## 2025-05-24 NOTE — DIETITIAN INITIAL EVALUATION ADULT - OTHER INFO
pt is 89 year old female with hx of dementia, spinal stenosis macular degeneration, CVA, bedbound p/w decline in po intake, N/V. pt admitted with diverticulitis vs colitis, UTI and leukocytosis.

## 2025-05-25 LAB
ANION GAP SERPL CALC-SCNC: 12 MMOL/L — SIGNIFICANT CHANGE UP (ref 7–14)
BASOPHILS # BLD AUTO: 0.02 K/UL — SIGNIFICANT CHANGE UP (ref 0–0.2)
BASOPHILS NFR BLD AUTO: 0.4 % — SIGNIFICANT CHANGE UP (ref 0–1)
BUN SERPL-MCNC: 18 MG/DL — SIGNIFICANT CHANGE UP (ref 10–20)
CALCIUM SERPL-MCNC: 8 MG/DL — LOW (ref 8.4–10.5)
CHLORIDE SERPL-SCNC: 105 MMOL/L — SIGNIFICANT CHANGE UP (ref 98–110)
CO2 SERPL-SCNC: 20 MMOL/L — SIGNIFICANT CHANGE UP (ref 17–32)
CREAT SERPL-MCNC: 0.8 MG/DL — SIGNIFICANT CHANGE UP (ref 0.7–1.5)
EGFR: 67 ML/MIN/1.73M2 — SIGNIFICANT CHANGE UP
EGFR: 67 ML/MIN/1.73M2 — SIGNIFICANT CHANGE UP
EOSINOPHIL # BLD AUTO: 0.11 K/UL — SIGNIFICANT CHANGE UP (ref 0–0.7)
EOSINOPHIL NFR BLD AUTO: 1.9 % — SIGNIFICANT CHANGE UP (ref 0–8)
GLUCOSE SERPL-MCNC: 87 MG/DL — SIGNIFICANT CHANGE UP (ref 70–99)
HCT VFR BLD CALC: 32.6 % — LOW (ref 37–47)
HGB BLD-MCNC: 10.5 G/DL — LOW (ref 12–16)
IMM GRANULOCYTES NFR BLD AUTO: 0.5 % — HIGH (ref 0.1–0.3)
LYMPHOCYTES # BLD AUTO: 1.09 K/UL — LOW (ref 1.2–3.4)
LYMPHOCYTES # BLD AUTO: 19.1 % — LOW (ref 20.5–51.1)
MCHC RBC-ENTMCNC: 29 PG — SIGNIFICANT CHANGE UP (ref 27–31)
MCHC RBC-ENTMCNC: 32.2 G/DL — SIGNIFICANT CHANGE UP (ref 32–37)
MCV RBC AUTO: 90.1 FL — SIGNIFICANT CHANGE UP (ref 81–99)
MONOCYTES # BLD AUTO: 0.49 K/UL — SIGNIFICANT CHANGE UP (ref 0.1–0.6)
MONOCYTES NFR BLD AUTO: 8.6 % — SIGNIFICANT CHANGE UP (ref 1.7–9.3)
NEUTROPHILS # BLD AUTO: 3.96 K/UL — SIGNIFICANT CHANGE UP (ref 1.4–6.5)
NEUTROPHILS NFR BLD AUTO: 69.5 % — SIGNIFICANT CHANGE UP (ref 42.2–75.2)
NRBC BLD AUTO-RTO: 0 /100 WBCS — SIGNIFICANT CHANGE UP (ref 0–0)
PLATELET # BLD AUTO: 199 K/UL — SIGNIFICANT CHANGE UP (ref 130–400)
PMV BLD: 10.7 FL — HIGH (ref 7.4–10.4)
POTASSIUM SERPL-MCNC: 3.6 MMOL/L — SIGNIFICANT CHANGE UP (ref 3.5–5)
POTASSIUM SERPL-SCNC: 3.6 MMOL/L — SIGNIFICANT CHANGE UP (ref 3.5–5)
RBC # BLD: 3.62 M/UL — LOW (ref 4.2–5.4)
RBC # FLD: 14 % — SIGNIFICANT CHANGE UP (ref 11.5–14.5)
SODIUM SERPL-SCNC: 136 MMOL/L — SIGNIFICANT CHANGE UP (ref 135–146)
WBC # BLD: 5.7 K/UL — SIGNIFICANT CHANGE UP (ref 4.8–10.8)
WBC # FLD AUTO: 5.7 K/UL — SIGNIFICANT CHANGE UP (ref 4.8–10.8)

## 2025-05-25 PROCEDURE — 99222 1ST HOSP IP/OBS MODERATE 55: CPT

## 2025-05-25 PROCEDURE — 99233 SBSQ HOSP IP/OBS HIGH 50: CPT

## 2025-05-25 RX ADMIN — FOLIC ACID 1 MILLIGRAM(S): 1 TABLET ORAL at 21:14

## 2025-05-25 RX ADMIN — Medication 1 APPLICATION(S): at 05:14

## 2025-05-25 RX ADMIN — CITALOPRAM 10 MILLIGRAM(S): 20 TABLET ORAL at 21:14

## 2025-05-25 RX ADMIN — HEPARIN SODIUM 5000 UNIT(S): 1000 INJECTION INTRAVENOUS; SUBCUTANEOUS at 17:23

## 2025-05-25 RX ADMIN — FUROSEMIDE 20 MILLIGRAM(S): 10 INJECTION INTRAMUSCULAR; INTRAVENOUS at 05:13

## 2025-05-25 RX ADMIN — MEROPENEM 100 MILLIGRAM(S): 1 INJECTION INTRAVENOUS at 05:13

## 2025-05-25 RX ADMIN — CLOPIDOGREL BISULFATE 75 MILLIGRAM(S): 75 TABLET, FILM COATED ORAL at 10:58

## 2025-05-25 RX ADMIN — MEROPENEM 100 MILLIGRAM(S): 1 INJECTION INTRAVENOUS at 17:23

## 2025-05-25 RX ADMIN — HEPARIN SODIUM 5000 UNIT(S): 1000 INJECTION INTRAVENOUS; SUBCUTANEOUS at 05:13

## 2025-05-25 RX ADMIN — AMIODARONE HYDROCHLORIDE 100 MILLIGRAM(S): 50 INJECTION, SOLUTION INTRAVENOUS at 05:13

## 2025-05-26 LAB
-  AMPICILLIN/SULBACTAM: SIGNIFICANT CHANGE UP
-  AMPICILLIN: SIGNIFICANT CHANGE UP
-  AZTREONAM: SIGNIFICANT CHANGE UP
-  CEFAZOLIN: SIGNIFICANT CHANGE UP
-  CEFEPIME: SIGNIFICANT CHANGE UP
-  CEFTRIAXONE: SIGNIFICANT CHANGE UP
-  CEFUROXIME: SIGNIFICANT CHANGE UP
-  CIPROFLOXACIN: SIGNIFICANT CHANGE UP
-  ERTAPENEM: SIGNIFICANT CHANGE UP
-  GENTAMICIN: SIGNIFICANT CHANGE UP
-  IMIPENEM: SIGNIFICANT CHANGE UP
-  LEVOFLOXACIN: SIGNIFICANT CHANGE UP
-  MEROPENEM: SIGNIFICANT CHANGE UP
-  NITROFURANTOIN: SIGNIFICANT CHANGE UP
-  PIPERACILLIN/TAZOBACTAM: SIGNIFICANT CHANGE UP
-  TIGECYCLINE: SIGNIFICANT CHANGE UP
-  TOBRAMYCIN: SIGNIFICANT CHANGE UP
-  TRIMETHOPRIM/SULFAMETHOXAZOLE: SIGNIFICANT CHANGE UP
ANION GAP SERPL CALC-SCNC: 9 MMOL/L — SIGNIFICANT CHANGE UP (ref 7–14)
BASOPHILS # BLD AUTO: 0.01 K/UL — SIGNIFICANT CHANGE UP (ref 0–0.2)
BASOPHILS NFR BLD AUTO: 0.1 % — SIGNIFICANT CHANGE UP (ref 0–1)
BUN SERPL-MCNC: 13 MG/DL — SIGNIFICANT CHANGE UP (ref 10–20)
CALCIUM SERPL-MCNC: 8 MG/DL — LOW (ref 8.4–10.5)
CHLORIDE SERPL-SCNC: 104 MMOL/L — SIGNIFICANT CHANGE UP (ref 98–110)
CO2 SERPL-SCNC: 24 MMOL/L — SIGNIFICANT CHANGE UP (ref 17–32)
CREAT SERPL-MCNC: 0.7 MG/DL — SIGNIFICANT CHANGE UP (ref 0.7–1.5)
CULTURE RESULTS: ABNORMAL
EGFR: 79 ML/MIN/1.73M2 — SIGNIFICANT CHANGE UP
EGFR: 79 ML/MIN/1.73M2 — SIGNIFICANT CHANGE UP
EOSINOPHIL # BLD AUTO: 0.08 K/UL — SIGNIFICANT CHANGE UP (ref 0–0.7)
EOSINOPHIL NFR BLD AUTO: 1.1 % — SIGNIFICANT CHANGE UP (ref 0–8)
GLUCOSE SERPL-MCNC: 77 MG/DL — SIGNIFICANT CHANGE UP (ref 70–99)
HCT VFR BLD CALC: 32.8 % — LOW (ref 37–47)
HGB BLD-MCNC: 10.6 G/DL — LOW (ref 12–16)
IMM GRANULOCYTES NFR BLD AUTO: 0.4 % — HIGH (ref 0.1–0.3)
LYMPHOCYTES # BLD AUTO: 1.21 K/UL — SIGNIFICANT CHANGE UP (ref 1.2–3.4)
LYMPHOCYTES # BLD AUTO: 16.9 % — LOW (ref 20.5–51.1)
MCHC RBC-ENTMCNC: 28.5 PG — SIGNIFICANT CHANGE UP (ref 27–31)
MCHC RBC-ENTMCNC: 32.3 G/DL — SIGNIFICANT CHANGE UP (ref 32–37)
MCV RBC AUTO: 88.2 FL — SIGNIFICANT CHANGE UP (ref 81–99)
METHOD TYPE: SIGNIFICANT CHANGE UP
MONOCYTES # BLD AUTO: 0.63 K/UL — HIGH (ref 0.1–0.6)
MONOCYTES NFR BLD AUTO: 8.8 % — SIGNIFICANT CHANGE UP (ref 1.7–9.3)
NEUTROPHILS # BLD AUTO: 5.19 K/UL — SIGNIFICANT CHANGE UP (ref 1.4–6.5)
NEUTROPHILS NFR BLD AUTO: 72.7 % — SIGNIFICANT CHANGE UP (ref 42.2–75.2)
NRBC BLD AUTO-RTO: 0 /100 WBCS — SIGNIFICANT CHANGE UP (ref 0–0)
ORGANISM # SPEC MICROSCOPIC CNT: ABNORMAL
ORGANISM # SPEC MICROSCOPIC CNT: SIGNIFICANT CHANGE UP
PLATELET # BLD AUTO: 216 K/UL — SIGNIFICANT CHANGE UP (ref 130–400)
PMV BLD: 9.7 FL — SIGNIFICANT CHANGE UP (ref 7.4–10.4)
POTASSIUM SERPL-MCNC: 3.6 MMOL/L — SIGNIFICANT CHANGE UP (ref 3.5–5)
POTASSIUM SERPL-SCNC: 3.6 MMOL/L — SIGNIFICANT CHANGE UP (ref 3.5–5)
RBC # BLD: 3.72 M/UL — LOW (ref 4.2–5.4)
RBC # FLD: 13.7 % — SIGNIFICANT CHANGE UP (ref 11.5–14.5)
SODIUM SERPL-SCNC: 137 MMOL/L — SIGNIFICANT CHANGE UP (ref 135–146)
SPECIMEN SOURCE: SIGNIFICANT CHANGE UP
WBC # BLD: 7.15 K/UL — SIGNIFICANT CHANGE UP (ref 4.8–10.8)
WBC # FLD AUTO: 7.15 K/UL — SIGNIFICANT CHANGE UP (ref 4.8–10.8)

## 2025-05-26 PROCEDURE — 74018 RADEX ABDOMEN 1 VIEW: CPT | Mod: 26

## 2025-05-26 PROCEDURE — 99233 SBSQ HOSP IP/OBS HIGH 50: CPT

## 2025-05-26 RX ORDER — GENTAMICIN SULFATE 40 MG/ML
250 VIAL (ML) INJECTION ONCE
Refills: 0 | Status: DISCONTINUED | OUTPATIENT
Start: 2025-05-26 | End: 2025-05-26

## 2025-05-26 RX ORDER — FUROSEMIDE 10 MG/ML
20 INJECTION INTRAMUSCULAR; INTRAVENOUS
Refills: 0 | Status: DISCONTINUED | OUTPATIENT
Start: 2025-05-26 | End: 2025-05-27

## 2025-05-26 RX ADMIN — FOLIC ACID 1 MILLIGRAM(S): 1 TABLET ORAL at 21:22

## 2025-05-26 RX ADMIN — MEROPENEM 100 MILLIGRAM(S): 1 INJECTION INTRAVENOUS at 18:28

## 2025-05-26 RX ADMIN — HEPARIN SODIUM 5000 UNIT(S): 1000 INJECTION INTRAVENOUS; SUBCUTANEOUS at 05:11

## 2025-05-26 RX ADMIN — CITALOPRAM 10 MILLIGRAM(S): 20 TABLET ORAL at 21:22

## 2025-05-26 RX ADMIN — Medication 1 APPLICATION(S): at 05:11

## 2025-05-26 RX ADMIN — AMIODARONE HYDROCHLORIDE 100 MILLIGRAM(S): 50 INJECTION, SOLUTION INTRAVENOUS at 05:10

## 2025-05-26 RX ADMIN — MEROPENEM 100 MILLIGRAM(S): 1 INJECTION INTRAVENOUS at 05:10

## 2025-05-26 RX ADMIN — HEPARIN SODIUM 5000 UNIT(S): 1000 INJECTION INTRAVENOUS; SUBCUTANEOUS at 18:27

## 2025-05-26 RX ADMIN — FUROSEMIDE 20 MILLIGRAM(S): 10 INJECTION INTRAMUSCULAR; INTRAVENOUS at 08:19

## 2025-05-26 RX ADMIN — CLOPIDOGREL BISULFATE 75 MILLIGRAM(S): 75 TABLET, FILM COATED ORAL at 12:54

## 2025-05-26 NOTE — PROGRESS NOTE ADULT - ASSESSMENT
This is a 89 year old female with dementia, spinal stenosis, macular degeneration, cva , bedbound patient is admitted with decreased po intake.    IMPRESSION  #Failure to thrive  #Leukocytosis   #Pyuria Can not ascertain if asymptomatic or not due to mental status. Treating as cystitis.  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    History of ESBL E.coli in urine 7/2023    Covid/Flu/RSV negative   Blood cultures NGTD  Urine Cultures- ESBL E.coli     RECOMMENDATIONS  -IV Meropenem 500mg BID. Can D/C abx tomorrow 5/27/2025.  -Off loading to prevent pressure sores and preventive measures to avoid aspiration.    If any questions, please send a message or call on [facility]  Please continue to update ID with any pertinent new laboratory or radiographic findings.    Tye Aquino M.D  Infectious Diseases Attending/   [employer] at [facility]

## 2025-05-27 PROCEDURE — 99233 SBSQ HOSP IP/OBS HIGH 50: CPT

## 2025-05-27 RX ORDER — MEROPENEM 1 G/30ML
500 INJECTION INTRAVENOUS ONCE
Refills: 0 | Status: COMPLETED | OUTPATIENT
Start: 2025-05-27 | End: 2025-05-27

## 2025-05-27 RX ORDER — AMLODIPINE BESYLATE 10 MG/1
5 TABLET ORAL DAILY
Refills: 0 | Status: DISCONTINUED | OUTPATIENT
Start: 2025-05-27 | End: 2025-05-28

## 2025-05-27 RX ORDER — SENNA 187 MG
2 TABLET ORAL AT BEDTIME
Refills: 0 | Status: DISCONTINUED | OUTPATIENT
Start: 2025-05-27 | End: 2025-05-28

## 2025-05-27 RX ORDER — POLYETHYLENE GLYCOL 3350 17 G/17G
17 POWDER, FOR SOLUTION ORAL
Refills: 0 | Status: DISCONTINUED | OUTPATIENT
Start: 2025-05-27 | End: 2025-05-28

## 2025-05-27 RX ORDER — BISACODYL 5 MG
10 TABLET, DELAYED RELEASE (ENTERIC COATED) ORAL ONCE
Refills: 0 | Status: COMPLETED | OUTPATIENT
Start: 2025-05-27 | End: 2025-05-27

## 2025-05-27 RX ADMIN — CLOPIDOGREL BISULFATE 75 MILLIGRAM(S): 75 TABLET, FILM COATED ORAL at 14:35

## 2025-05-27 RX ADMIN — Medication 10 MILLIGRAM(S): at 14:39

## 2025-05-27 RX ADMIN — MEROPENEM 100 MILLIGRAM(S): 1 INJECTION INTRAVENOUS at 17:30

## 2025-05-27 RX ADMIN — MEROPENEM 100 MILLIGRAM(S): 1 INJECTION INTRAVENOUS at 05:29

## 2025-05-27 RX ADMIN — Medication 2 TABLET(S): at 23:10

## 2025-05-27 RX ADMIN — POLYETHYLENE GLYCOL 3350 17 GRAM(S): 17 POWDER, FOR SOLUTION ORAL at 17:50

## 2025-05-27 RX ADMIN — FUROSEMIDE 20 MILLIGRAM(S): 10 INJECTION INTRAMUSCULAR; INTRAVENOUS at 09:21

## 2025-05-27 RX ADMIN — FOLIC ACID 1 MILLIGRAM(S): 1 TABLET ORAL at 23:10

## 2025-05-27 RX ADMIN — HEPARIN SODIUM 5000 UNIT(S): 1000 INJECTION INTRAVENOUS; SUBCUTANEOUS at 17:30

## 2025-05-27 RX ADMIN — HEPARIN SODIUM 5000 UNIT(S): 1000 INJECTION INTRAVENOUS; SUBCUTANEOUS at 05:29

## 2025-05-27 RX ADMIN — CITALOPRAM 10 MILLIGRAM(S): 20 TABLET ORAL at 23:10

## 2025-05-27 RX ADMIN — Medication 1 APPLICATION(S): at 05:29

## 2025-05-27 RX ADMIN — AMIODARONE HYDROCHLORIDE 100 MILLIGRAM(S): 50 INJECTION, SOLUTION INTRAVENOUS at 05:29

## 2025-05-27 NOTE — SWALLOW BEDSIDE ASSESSMENT ADULT - SWALLOW EVAL: RECOMMENDED DIET
Puree with mildly thick liquids as accepted/tolerated, aspiration precautions
statin
C/w home lipitor 80qd
C/w home lipitor 80qd

## 2025-05-27 NOTE — PROGRESS NOTE ADULT - REASON FOR ADMISSION
uti/ diverticulitis

## 2025-05-27 NOTE — SWALLOW BEDSIDE ASSESSMENT ADULT - SLP PERTINENT HISTORY OF CURRENT PROBLEM
89 y/o female presents to the ED with family for increased weakness, AMS,  and watery diarrhea over past few days. Pt  with hx of dementia, spinal stenosis, macular degeneration, cva , bedbound patient with decreased po intake. patient without any cough or congestion. patient with multiple episodes of vomiting without any hematemesis
89 y/o female presents to the ED with family for increased weakness, AMS,  and watery diarrhea over past few days. Pt  with hx of dementia, spinal stenosis, macular degeneration, cva , bedbound patient with decreased po intake. patient without any cough or congestion. patient with multiple episodes of vomiting without any hematemesis
89 y/o female presents to the ED with family for increased weakness, AMS,  and watery diarrhea over past few days. Pt  with hx of dementia, spinal stenosis, macular degeneration, cva , bedbound patient with decreased po intake. patient without any cough or congestion. patient with multiple episodes of vomiting without any hematemesis

## 2025-05-27 NOTE — SWALLOW BEDSIDE ASSESSMENT ADULT - SWALLOW EVAL: FUNCTIONAL LEVEL AT TIME OF EVAL
Pt received asleep, awoke to verbal/tactile stim. +Baseline rhonchus cough - RN reports that this is new since this AM.
Pt received asleep, awoke to verbal/tactile stim.

## 2025-05-27 NOTE — SWALLOW BEDSIDE ASSESSMENT ADULT - SWALLOW EVAL: DIAGNOSIS
Toleration of MIN trials of puree with mildly thick liquids via tsp trials w/o immediate overt s/s of penetration/aspiration.
Toleration of MIN trials of puree with mildly thick liquids via tsp trials w/o immediate overt s/s of penetration/aspiration.
Toleration of MIN trials of mildly thick liquids via tsp trials w/o immediate overt s/s of penetration/aspiration.

## 2025-05-27 NOTE — SWALLOW BEDSIDE ASSESSMENT ADULT - ASPIRATION PRECAUTIONS
high risk for aspiration. Low threshold for NPO/yes
yes
high risk for aspiration. Low threshold for NPO/yes

## 2025-05-27 NOTE — PROGRESS NOTE ADULT - SUBJECTIVE AND OBJECTIVE BOX
SUBJECTIVE:    Patient is a 89y old Female who presents with a chief complaint of uti/ diverticulitis (25 May 2025 13:31)    Currently admitted to medicine with the primary diagnosis of Sepsis       Today is hospital day 3d. This morning she is resting comfortably in bed         PAST MEDICAL & SURGICAL HISTORY  HTN (hypertension)    CVA (cerebrovascular accident)    Dementia    Chronic atrial fibrillation    History of macular degeneration    Arthritis    Presence of Watchman left atrial appendage closure device    Fracture, tibia and fibula      SOCIAL HISTORY:    ALLERGIES:  aspirin (Unknown)  penicillin (Unknown)    MEDICATIONS:  STANDING MEDICATIONS  aMIOdarone    Tablet 100 milliGRAM(s) Oral daily  chlorhexidine 2% Cloths 1 Application(s) Topical <User Schedule>  citalopram 10 milliGRAM(s) Oral at bedtime  clopidogrel Tablet 75 milliGRAM(s) Oral daily  folic acid 1 milliGRAM(s) Oral at bedtime  furosemide    Tablet 20 milliGRAM(s) Oral <User Schedule>  heparin   Injectable 5000 Unit(s) SubCutaneous every 12 hours  meropenem  IVPB 500 milliGRAM(s) IV Intermittent every 12 hours    PRN MEDICATIONS    VITALS:   T(F): 98  HR: 65  BP: 122/67  RR: 18  SpO2: 98%    LABS:  Negative for smoking/alcohol/drug use.                         10.6   7.15  )-----------( 216      ( 26 May 2025 06:59 )             32.8     05-26    137  |  104  |  13  ----------------------------<  77  3.6   |  24  |  0.7    Ca    8.0[L]      26 May 2025 06:59        Urinalysis Basic - ( 26 May 2025 06:59 )    Color: x / Appearance: x / SG: x / pH: x  Gluc: 77 mg/dL / Ketone: x  / Bili: x / Urobili: x   Blood: x / Protein: x / Nitrite: x   Leuk Esterase: x / RBC: x / WBC x   Sq Epi: x / Non Sq Epi: x / Bacteria: x                RADIOLOGY:    PHYSICAL EXAM:  GEN: No acute distress  HEENT: normocephalic, atraumatic, aniceteric  LUNGS: Clear to auscultation bilaterally, no rales/wheezing/ rhonchi  HEART: S1/S2 present. RRR, no murmurs  ABD: Soft, non-tender, non-distended. Bowel sounds present  EXT: warm   NEURO: awake       ASSESSMENT AND PLAN:    89 y/o female presents to the ED with family for increased weakness, AMS,  and watery diarrhea over past few days. Pt  with hx of dementia, spinal stenosis, macular degeneration, cva , bedbound patient with decreased po intake. patient without any cough or congestion. patient with multiple episodes of vomiting without any hematemesis.    A/P D/W pt's daughter Sophia ar [phone]    # Leukocytosis/ Pyuria - UTI   # Colitis/colonic diverticulosis   # H/O ESBL   - CTAP : Fat stranding and fluid mesenteric edema noted in the pelvis surrounding   the rectosigmoid bowel loops. Mild rectosigmoid wall thickening though   the signal colon is decompressed. Findings may reflect colitis.Nonspecific urinary bladder wall thickening and surrounding fat   stranding. Correlate with urinalysis for cystitis. Subcutaneous edema and skin thickening posterior to the sacrum. Correlate   for ulcer. Additional findings detailed above.  - no diarrhea since patient came to the hospital , no need to collect stool studies   - urine clx E.Coli ESBL   - blood clx neg   - ID EVAL Appreciated - Meropenem (d4) - can dc tomorrow     # NINA - resolved   # Hypernatremia - resolved   - Cr 2 >>> 1   - monitor bmp   - avoid nephrotoxic medications   - monitor and correct electrolytes - on d5 do not over correct     # H/O Dementia/ Mood disorder  - supportive care  , speech and swallow eval appreciated ; cw home citalopram     # H/O Spinal stenosis/ CVA/ Functionally quadriplegic    # H/O HTN- HOLD Norvasc 5 md QD/  VAlsartan 160mg QD / Spirinolactone  given soft blood pressure     # H/O Chronic A fib sp watchamm- C/W amiodarone , not on AC     # H/O Sacral wound - eval from wound care- Clean skin with soap and water , pat dry apply triad hydrophilic dressing     dvt/ gi ppx/diet  dispo: acute - has 24/7 care  - poss 24 hrs   family discussion: spoke  daughter Sophia - all questions and concerns addressed  5/25  
ARMANDO SCHMITZ  89y, Female    LOS  3d    INTERVAL EVENTS/HPI  - T(F): , Max: 98.6 (05-26-25 @ 04:16)  - WBC Count: 7.15 (05-26-25 @ 06:59)  WBC Count: 5.70 (05-25-25 @ 06:32)  - Creatinine: 0.7 (05-26-25 @ 06:59)  Creatinine: 0.8 (05-25-25 @ 06:32)    REVIEW OF SYSTEMS:  CONSTITUTIONAL: No fever or chills  HEENT: No sore throat  RESPIRATORY: No cough, no shortness of breath  CARDIOVASCULAR: No chest pain or palpitations  GASTROINTESTINAL: No abdominal or epigastric pain  GENITOURINARY: No dysuria  NEUROLOGICAL: No headache/dizziness  MSK: No joint pain, erythema, or swelling; no back pain  SKIN: No itching, rashes  All other ROS negative except noted above    Prior hospital charts reviewed [Yes]  Primary team notes reviewed [Yes]  Other consultant notes reviewed [Yes]    ANTIMICROBIALS:   meropenem  IVPB 500 every 12 hours      OTHER MEDS: MEDICATIONS  (STANDING):  aMIOdarone    Tablet 100 daily  citalopram 10 at bedtime  clopidogrel Tablet 75 daily  furosemide    Tablet 20 <User Schedule>  heparin   Injectable 5000 every 12 hours      Vital Signs Last 24 Hrs  T(F): 98 (05-26-25 @ 13:06), Max: 101 (05-22-25 @ 19:58)    Vital Signs Last 24 Hrs  HR: 65 (05-26-25 @ 13:06) (58 - 67)  BP: 122/67 (05-26-25 @ 13:06) (102/54 - 149/72)  RR: 18 (05-26-25 @ 13:06)  SpO2: 98% (05-26-25 @ 13:06) (97% - 98%)  Wt(kg): --    EXAM:  GENERAL: Frail looking.   HEAD: No head lesions, On NC  CHEST/LUNG: Shallow breath sounds.   HEART: S1 S2  ABDOMEN: Soft, nontender  EXTREMITIES: + 1 edema   NERVOUS SYSTEM: Awake. Not interactive.   MSK: No joint erythema, swelling or pain    Labs:                        10.6   7.15  )-----------( 216      ( 26 May 2025 06:59 )             32.8     05-26    137  |  104  |  13  ----------------------------<  77  3.6   |  24  |  0.7    Ca    8.0[L]      26 May 2025 06:59        WBC Trend:  WBC Count: 7.15 (05-26-25 @ 06:59)  WBC Count: 5.70 (05-25-25 @ 06:32)  WBC Count: 6.93 (05-24-25 @ 06:32)  WBC Count: 10.69 (05-23-25 @ 12:23)      Creatine Trend:  Creatinine: 0.7 (05-26)  Creatinine: 0.8 (05-25)  Creatinine: 1.0 (05-24)  Creatinine: 1.5 (05-23)      Liver Biochemical Testing Trend:  Alanine Aminotransferase (ALT/SGPT): 10 (05-22)  Aspartate Aminotransferase (AST/SGOT): 25 (05-22-25 @ 20:30)  Bilirubin Total: 0.8 (05-22)      Trend LDH      Urinalysis Basic - ( 26 May 2025 06:59 )    Color: x / Appearance: x / SG: x / pH: x  Gluc: 77 mg/dL / Ketone: x  / Bili: x / Urobili: x   Blood: x / Protein: x / Nitrite: x   Leuk Esterase: x / RBC: x / WBC x   Sq Epi: x / Non Sq Epi: x / Bacteria: x        MICROBIOLOGY:    Female    Culture - Blood (collected 22 May 2025 20:30)  Source: Blood Blood-Peripheral  Preliminary Report:    No growth at 72 Hours    Culture - Blood (collected 22 May 2025 20:30)  Source: Blood Blood-Peripheral  Preliminary Report:    No growth at 72 Hours    Urinalysis with Rflx Culture (collected 22 May 2025 20:10)    Culture - Urine (collected 22 May 2025 20:10)  Source: Catheterized None  Final Report:    >100,000 CFU/ml Escherichia coli ESBL  Organism: Escherichia coli ESBL  Organism: Escherichia coli ESBL    Sensitivities:      -  Levofloxacin: R >4      -  Tobramycin: S <=2      -  Nitrofurantoin: S <=32 Should not be used to treat pyelonephritis      -  Aztreonam: R <=4      -  Gentamicin: S <=2      -  Cefazolin: R >16 For uncomplicated UTI with K. pneumoniae, E. coli, or P. mirablis: VINCENZO <=16 is sensitive and VINCENZO >=32 is resistant. This also predicts results for oral agents cefaclor, cefdinir, cefpodoxime, cefprozil, cefuroxime axetil, cephalexin and locarbef for uncomplicated UTI. Note that some isolates may be susceptible to these agents while testing resistant to cefazolin.      -  Cefepime: R 8      -  Piperacillin/Tazobactam: S <=8      -  Ciprofloxacin: R >2      -  Imipenem: S <=1      -  Ceftriaxone: R >32      -  Ampicillin: R >16 These ampicillin results predict results for amoxicillin      Method Type: VINCENZO      -  Meropenem: S <=1      -  Ampicillin/Sulbactam: S <=4/2      -  Cefuroxime: R >16      -  Trimethoprim/Sulfamethoxazole: R >2/38      -  Ertapenem: S <=0.5      -  Tigecycline: S <=2 Interpretations based on FDA breakpoints    Culture - Blood (collected 27 Jul 2023 21:46)  Source: .Blood Blood-Peripheral  Final Report:    No growth at 5 days    Culture - Urine (collected 27 Jul 2023 14:25)  Source: Clean Catch Clean Catch (Midstream)  Final Report:    >100,000 CFU/ml Escherichia coli ESBL  Organism: Escherichia coli ESBL  Organism: Escherichia coli ESBL    Sensitivities:      -  Levofloxacin: R >4      -  Tobramycin: S 4      -  Nitrofurantoin: S <=32 Should not be used to treat pyelonephritis      -  Aztreonam: R >16      -  Gentamicin: S <=2      -  Cefazolin: R >16 For uncomplicated UTI with K. pneumoniae, E. coli, or P. mirablis: VINCENZO <=16 is sensitive and VINCENZO >=32 is resistant. This also predicts results for oral agents cefaclor, cefdinir, cefpodoxime, cefprozil, cefuroxime axetil, cephalexin and locarbef for uncomplicated UTI. Note that some isolates may be susceptible to these agents while testing resistant to cefazolin.      -  Cefepime: R >16      -  Piperacillin/Tazobactam: S <=8      -  Ciprofloxacin: R >2      -  Imipenem: S <=1      -  Ceftriaxone: R >32 Enterobacter, Klebsiella aerogenes, Citrobacter, and Serratia may develop resistance during prolonged therapy      -  Ampicillin: R >16 These ampicillin results predict results for amoxicillin      Method Type: VINCENZO      -  Meropenem: S <=1      -  Ampicillin/Sulbactam: I 16/8 Enterobacter, Klebsiella aerogenes, Citrobacter, and Serratia may develop resistance during prolonged therapy (3-4 days)      -  Cefuroxime: R >16      -  Amikacin: S <=16      -  Amoxicillin/Clavulanic Acid: S <=8/4      -  Trimethoprim/Sulfamethoxazole: R >2/38      -  Ertapenem: S <=0.5    RADIOLOGY & ADDITIONAL TESTS:  I have personally reviewed the relevant images.   CXR      CT    < from: Xray Kidney Ureter Bladder (05.26.25 @ 10:54) >  Findings/  impression:    Nonobstructive bowel gas pattern. Mild stool burden.    Osseous structures demonstrate no acute abnormality.    --- End of Report ---    < end of copied text >        WEIGHT  Weight (kg): 49.9 (05-22-25 @ 18:29)  Creatinine: 0.7 mg/dL (05-26-25 @ 06:59)      All available historical records have been reviewed      
SUBJECTIVE:    Patient is a 89y old Female who presents with a chief complaint of uti/ diverticulitis (23 May 2025 15:55)    Currently admitted to medicine with the primary diagnosis of Sepsis       Today is hospital day 1d. This morning she is resting comfortably in bed and reports no new issues or overnight events.         PAST MEDICAL & SURGICAL HISTORY  HTN (hypertension)    CVA (cerebrovascular accident)    Dementia    Chronic atrial fibrillation    History of macular degeneration    Arthritis    Presence of Watchman left atrial appendage closure device    Fracture, tibia and fibula      SOCIAL HISTORY:    ALLERGIES:  aspirin (Unknown)  penicillin (Unknown)    MEDICATIONS:  STANDING MEDICATIONS  aMIOdarone    Tablet 100 milliGRAM(s) Oral daily  chlorhexidine 2% Cloths 1 Application(s) Topical <User Schedule>  citalopram 10 milliGRAM(s) Oral at bedtime  clopidogrel Tablet 75 milliGRAM(s) Oral daily  dextrose 5%. 1000 milliLiter(s) IV Continuous <Continuous>  folic acid 1 milliGRAM(s) Oral at bedtime  furosemide    Tablet 20 milliGRAM(s) Oral daily  heparin   Injectable 5000 Unit(s) SubCutaneous every 12 hours  meropenem  IVPB 500 milliGRAM(s) IV Intermittent every 12 hours  potassium chloride  20 mEq/100 mL IVPB 20 milliEquivalent(s) IV Intermittent once    PRN MEDICATIONS    VITALS:   T(F): 97.8  HR: 51  BP: 119/60  RR: 18  SpO2: 95%    LABS:  Negative for smoking/alcohol/drug use.                         10.8   6.93  )-----------( 195      ( 24 May 2025 06:32 )             34.0     05-24    147[H]  |  113[H]  |  32[H]  ----------------------------<  77  3.4[L]   |  22  |  1.0    Ca    8.2[L]      24 May 2025 06:32  Mg     2.0     05-24    TPro  6.9  /  Alb  3.2[L]  /  TBili  0.8  /  DBili  x   /  AST  25  /  ALT  10  /  AlkPhos  108  05-22    PT/INR - ( 22 May 2025 20:30 )   PT: 12.10 sec;   INR: 1.02 ratio         PTT - ( 22 May 2025 20:30 )  PTT:29.6 sec  Urinalysis Basic - ( 24 May 2025 06:32 )    Color: x / Appearance: x / SG: x / pH: x  Gluc: 77 mg/dL / Ketone: x  / Bili: x / Urobili: x   Blood: x / Protein: x / Nitrite: x   Leuk Esterase: x / RBC: x / WBC x   Sq Epi: x / Non Sq Epi: x / Bacteria: x            Culture - Blood (collected 22 May 2025 20:30)  Source: Blood Blood-Peripheral  Preliminary Report (24 May 2025 03:01):    No growth at 24 hours    Culture - Blood (collected 22 May 2025 20:30)  Source: Blood Blood-Peripheral  Preliminary Report (24 May 2025 03:02):    No growth at 24 hours    Urinalysis with Rflx Culture (collected 22 May 2025 20:10)          RADIOLOGY:    PHYSICAL EXAM:  GEN: No acute distress  HEENT: normocephalic, atraumatic, aniceteric  LUNGS: Clear to auscultation bilaterally, no rales/wheezing/ rhonchi  HEART: S1/S2 present. RRR, no murmurs  ABD: Soft, non-tender, non-distended. Bowel sounds present  EXT: NC/NC/NE/2+PP/FALCON  NEURO: AAOX0, awake , does not follows commands       ASSESSMENT AND PLAN:    89 y/o female presents to the ED with family for increased weakness, AMS,  and watery diarrhea over past few days. Pt  with hx of dementia, spinal stenosis, macular degeneration, cva , bedbound patient with decreased po intake. patient without any cough or congestion. patient with multiple episodes of vomiting without any hematemesis.    A/P D/W pt's daughter Sophia ar [phone]    # Leukocytosis/ Pyuria - R/O UTI   # Colitis/colonic diverticulosis   # H/O ESBL   - CTAP : Fat stranding and fluid mesenteric edema noted in the pelvis surrounding   the rectosigmoid bowel loops. Mild rectosigmoid wall thickening though   the signal colon is decompressed. Findings may reflect colitis.Nonspecific urinary bladder wall thickening and surrounding fat   stranding. Correlate with urinalysis for cystitis. Subcutaneous edema and skin thickening posterior to the sacrum. Correlate   for ulcer. Additional findings detailed above.  - send stool studies if diarrhea recurs   - fu urine clx  - fu blood clx   - ID EVAL Appreciated - Meropenem (d2)     # NINA - resolved   # Hypernatremia   - Cr 2 >>> 1   - monitor bmp   - avoid nephrotoxic medications   - monitor and correct electrolytes - on d5 do not over correct     # H/O Dementia/ Mood disorder  - supportive care  , speech and swallow eval appreciated ; cw home citalopram     # H/O Spinal stenosis/ CVA/ Functionally quadriplegic    # H/O HTN- HOLD Norvasc 5 md QD/  VAlsartan 160mg QD / Spirinolactone  given soft blood pressure     # H/O Chronic A fib sp watchamm- C/W amiodarone , not on AC     # H/O Sacral wound - eval from wound care- Clean skin with soap and water , pat dry apply triad hydrophilic dressing     dvt/ gi ppx/diet  dispo: acute   family discussion: attempted to call daughter Sophia - the number was busy multiple times - 5/24 - will re attempt         
SUBJECTIVE:    Patient is a 89y old Female who presents with a chief complaint of uti/ diverticulitis (26 May 2025 14:34)    Currently admitted to medicine with the primary diagnosis of Sepsis       Today is hospital day 4d. This morning she is resting comfortably in bed - per staff was retaining urine overnight and had to be straight cath'ed twice           PAST MEDICAL & SURGICAL HISTORY  HTN (hypertension)    CVA (cerebrovascular accident)    Dementia    Chronic atrial fibrillation    History of macular degeneration    Arthritis    Presence of Watchman left atrial appendage closure device    Fracture, tibia and fibula      SOCIAL HISTORY:    ALLERGIES:  aspirin (Unknown)  penicillin (Unknown)    MEDICATIONS:  STANDING MEDICATIONS  aMIOdarone    Tablet 100 milliGRAM(s) Oral daily  chlorhexidine 2% Cloths 1 Application(s) Topical <User Schedule>  citalopram 10 milliGRAM(s) Oral at bedtime  clopidogrel Tablet 75 milliGRAM(s) Oral daily  folic acid 1 milliGRAM(s) Oral at bedtime  heparin   Injectable 5000 Unit(s) SubCutaneous every 12 hours  meropenem  IVPB 500 milliGRAM(s) IV Intermittent every 12 hours    PRN MEDICATIONS    VITALS:   T(F): 97.6  HR: 57  BP: 125/48  RR: 18  SpO2: 97%    LABS:                           10.6   7.15  )-----------( 216      ( 26 May 2025 06:59 )             32.8     05-26    137  |  104  |  13  ----------------------------<  77  3.6   |  24  |  0.7    Ca    8.0[L]      26 May 2025 06:59        Urinalysis Basic - ( 26 May 2025 06:59 )    Color: x / Appearance: x / SG: x / pH: x  Gluc: 77 mg/dL / Ketone: x  / Bili: x / Urobili: x   Blood: x / Protein: x / Nitrite: x   Leuk Esterase: x / RBC: x / WBC x   Sq Epi: x / Non Sq Epi: x / Bacteria: x                RADIOLOGY:    PHYSICAL EXAM:  GEN: No acute distress  HEENT: normocephalic, atraumatic, aniceteric  LUNGS: bl breath sounds   HEART: S1/S2 present. RRR, no murmurs  ABD: Soft, non-tender, non-distended. Bowel sounds present  EXT: warm   NEURO: awake    ASSESSMENT AND PLAN:    89 y/o female presents to the ED with family for increased weakness, AMS,  and watery diarrhea over past few days. Pt  with hx of dementia, spinal stenosis, macular degeneration, cva , bedbound patient with decreased po intake. patient without any cough or congestion. patient with multiple episodes of vomiting without any hematemesis.    A/P D/W pt's daughter Sophia ar [phone]    # Leukocytosis/ Pyuria - UTI   # Colitis/colonic diverticulosis   # H/O ESBL   - CTAP : Fat stranding and fluid mesenteric edema noted in the pelvis surrounding   the rectosigmoid bowel loops. Mild rectosigmoid wall thickening though   the signal colon is decompressed. Findings may reflect colitis.Nonspecific urinary bladder wall thickening and surrounding fat   stranding. Correlate with urinalysis for cystitis. Subcutaneous edema and skin thickening posterior to the sacrum. Correlate   for ulcer. Additional findings detailed above.  - no diarrhea since patient came to the hospital , no need to collect stool studies   - urine clx E.Coli ESBL   - blood clx neg   - ID EVAL Appreciated - Meropenem (d5/5)     # Urinary retention  # Constipation   - bladder scan q6h, straight cath pvr > 300 (so far required x2 straight cath)  - bowel regimen ;     # NINA - resolved   # Hypernatremia - resolved   - Cr 2 >>> 1   - monitor bmp   - avoid nephrotoxic medications   - monitor and correct electrolytes - on d5 do not over correct     # H/O Dementia/ Mood disorder  - supportive care  , speech and swallow eval appreciated ; cw home citalopram     # H/O Spinal stenosis/ CVA/ Functionally quadriplegic    # H/O HTN- resume Norvasc 5 md QD ; HOLD valsartan/ spironolactone / lasix (per daughter and chart review no history of heart failure)     # H/O Chronic A fib sp watchamm- C/W amiodarone , not on AC     # H/O Sacral wound - eval from wound care- Clean skin with soap and water , pat dry apply triad hydrophilic dressing     dvt/ gi ppx/diet  dispo: acute - has 24/7 care  - poss 24 hrs   family discussion: spoke  daughter Sophia - all questions and concerns addressed  5/27 - all questions answered and concerns addressed - aware of disposition plan   handoff:   [] monitor for resolution of constipation   [] bladder scans for urinary rentention - attempt to avoid placing goodwin especially if constipation resolves  [] CM working on reinstating aids for d/c tomorrow   
SUBJECTIVE:    Patient is a 89y old Female who presents with a chief complaint of uti/ diverticulitis (23 May 2025 15:17)    Currently admitted to medicine with the primary diagnosis of Sepsis       Today is hospital day . This morning she is resting comfortably in bed and reports no new issues or overnight events.     ROS:   CONSTITUTIONAL: No weakness, fevers or chills   EYES/ENT: No visual changes; No vertigo or throat pain   NECK: No pain or stiffness   RESPIRATORY: No cough, wheezing, hemoptysis; No shortness of breath   CARDIOVASCULAR: No chest pain or palpitations   GASTROINTESTINAL: No abdominal or epigastric pain. No nausea, vomiting, or hematemesis; No diarrhea or constipation. No melena or hematochezia.  GENITOURINARY: No dysuria, frequency or hematuria  NEUROLOGICAL: No numbness or weakness  SKIN: No itching, rashes      PAST MEDICAL & SURGICAL HISTORY  HTN (hypertension)    CVA (cerebrovascular accident)    Dementia    Chronic atrial fibrillation    History of macular degeneration    Arthritis    Presence of Watchman left atrial appendage closure device    Fracture, tibia and fibula      SOCIAL HISTORY:    ALLERGIES:  aspirin (Unknown)  penicillin (Unknown)    MEDICATIONS:  STANDING MEDICATIONS  aMIOdarone    Tablet 100 milliGRAM(s) Oral daily  amLODIPine   Tablet 5 milliGRAM(s) Oral daily  chlorhexidine 2% Cloths 1 Application(s) Topical <User Schedule>  citalopram 10 milliGRAM(s) Oral at bedtime  clopidogrel Tablet 75 milliGRAM(s) Oral daily  folic acid 1 milliGRAM(s) Oral at bedtime  furosemide    Tablet 20 milliGRAM(s) Oral daily  heparin   Injectable 5000 Unit(s) SubCutaneous every 12 hours  meropenem  IVPB 500 milliGRAM(s) IV Intermittent every 12 hours    PRN MEDICATIONS    VITALS:   T(F): 97.5  HR: 62  BP: 113/77  RR: 18  SpO2: 99%    LABS:                          11.0   10.69 )-----------( 200      ( 23 May 2025 12:23 )             34.4     05-23    148[H]  |  113[H]  |  45[H]  ----------------------------<  67[L]  4.0   |  21  |  1.5    Ca    7.9[L]      23 May 2025 04:30    TPro  6.9  /  Alb  3.2[L]  /  TBili  0.8  /  DBili  x   /  AST  25  /  ALT  10  /  AlkPhos  108  05-22    PT/INR - ( 22 May 2025 20:30 )   PT: 12.10 sec;   INR: 1.02 ratio         PTT - ( 22 May 2025 20:30 )  PTT:29.6 sec  Urinalysis Basic - ( 23 May 2025 04:30 )    Color: x / Appearance: x / SG: x / pH: x  Gluc: 67 mg/dL / Ketone: x  / Bili: x / Urobili: x   Blood: x / Protein: x / Nitrite: x   Leuk Esterase: x / RBC: x / WBC x   Sq Epi: x / Non Sq Epi: x / Bacteria: x        Lactate, Blood: 1.8 mmol/L (05-23-25 @ 00:06)  Lactate, Blood: 2.5 mmol/L *H* (05-22-25 @ 20:30)      Urinalysis with Rflx Culture (collected 22 May 2025 20:10)        RADIOLOGY:    PHYSICAL EXAM:  GEN: No acute distress  HEENT: normocephalic, atraumatic, aniceteric  LUNGS: bl breath sounds   HEART: S1/S2 present. RRR, no murmurs  ABD: Soft, non-tender, non-distended. Bowel sounds present  EXT: warm   NEURO: aax1       ASSESSMENT AND PLAN:    89 y/o female presents to the ED with family for increased weakness, AMS,  and watery diarrhea over past few days. Pt  with hx of dementia, spinal stenosis, macular degeneration, cva , bedbound patient with decreased po intake. patient without any cough or congestion. patient with multiple episodes of vomiting without any hematemesis.    A/P D/W pt's daughter Sophia ar [phone]    # Leukocytosis/ Pyuria - R/O UTI   # Colitis/colonic diverticulosis   # H/O ESBL   - CTAP : Fat stranding and fluid mesenteric edema noted in the pelvis surrounding   the rectosigmoid bowel loops. Mild rectosigmoid wall thickening though   the signal colon is decompressed. Findings may reflect colitis.Nonspecific urinary bladder wall thickening and surrounding fat   stranding. Correlate with urinalysis for cystitis. Subcutaneous edema and skin thickening posterior to the sacrum. Correlate   for ulcer. Additional findings detailed above.  - send stool studies if diarrhea recurs   - fu urine clx  - fu blood clx   - ID EVAL Appreciated - Meropenem (d1)     # NINA on CKD4 ?? - resolved   - Cr 1.5 now lower than previous baseline  - monitor bmp   - avoid nephrotoxic medications     # H/O Dementia/ Mood disorder  - supportive care  , speech and swallow eval appreciated ; cw home citalopram     # H/O Spinal stenosis/ CVA/ Functionally quadriplegic    # H/O HTN- HOLD Norvasc 5 md QD/  VAlsartan 160mg QD / Spirinolactone  given soft blood pressure     # H/O chronic A fib sp watchamm- C/W amiodarone    # H/O Sacral ulcer- wound eval     dvt/ gi ppx/diet  dispo: acute     
SUBJECTIVE:    Patient is a 89y old Female who presents with a chief complaint of uti/ diverticulitis (24 May 2025 16:45)    Currently admitted to medicine with the primary diagnosis of Sepsis       Today is hospital day 2d. This morning she is resting comfortably in bed          PAST MEDICAL & SURGICAL HISTORY  HTN (hypertension)    CVA (cerebrovascular accident)    Dementia    Chronic atrial fibrillation    History of macular degeneration    Arthritis    Presence of Watchman left atrial appendage closure device    Fracture, tibia and fibula      SOCIAL HISTORY:    ALLERGIES:  aspirin (Unknown)  penicillin (Unknown)    MEDICATIONS:  STANDING MEDICATIONS  aMIOdarone    Tablet 100 milliGRAM(s) Oral daily  chlorhexidine 2% Cloths 1 Application(s) Topical <User Schedule>  citalopram 10 milliGRAM(s) Oral at bedtime  clopidogrel Tablet 75 milliGRAM(s) Oral daily  folic acid 1 milliGRAM(s) Oral at bedtime  furosemide    Tablet 20 milliGRAM(s) Oral daily  heparin   Injectable 5000 Unit(s) SubCutaneous every 12 hours  meropenem  IVPB 500 milliGRAM(s) IV Intermittent every 12 hours    PRN MEDICATIONS    VITALS:   T(F): 97.8  HR: 62  BP: 129/56  RR: 18  SpO2: 99%    LABS:  Negative for smoking/alcohol/drug use.                         10.5   5.70  )-----------( 199      ( 25 May 2025 06:32 )             32.6     05-25    136  |  105  |  18  ----------------------------<  87  3.6   |  20  |  0.8    Ca    8.0[L]      25 May 2025 06:32  Mg     2.0     05-24        Urinalysis Basic - ( 25 May 2025 06:32 )    Color: x / Appearance: x / SG: x / pH: x  Gluc: 87 mg/dL / Ketone: x  / Bili: x / Urobili: x   Blood: x / Protein: x / Nitrite: x   Leuk Esterase: x / RBC: x / WBC x   Sq Epi: x / Non Sq Epi: x / Bacteria: x            Culture - Blood (collected 22 May 2025 20:30)  Source: Blood Blood-Peripheral  Preliminary Report (25 May 2025 03:01):    No growth at 48 Hours    Culture - Blood (collected 22 May 2025 20:30)  Source: Blood Blood-Peripheral  Preliminary Report (25 May 2025 03:01):    No growth at 48 Hours    Urinalysis with Rflx Culture (collected 22 May 2025 20:10)    Culture - Urine (collected 22 May 2025 20:10)  Source: Catheterized None  Preliminary Report (24 May 2025 21:45):    >100,000 CFU/ml Escherichia coli          RADIOLOGY:    PHYSICAL EXAM:  GEN: No acute distress  HEENT: normocephalic, atraumatic, aniceteric  LUNGS: Clear to auscultation bilaterally, no rales/wheezing/ rhonchi  HEART: S1/S2 present. RRR, no murmurs  ABD: Soft, non-tender, non-distended. Bowel sounds present  EXT: NC/NC/NE/2+PP/FALCON  NEURO: AAOX3, normal affect      ASSESSMENT AND PLAN:    89 y/o female presents to the ED with family for increased weakness, AMS,  and watery diarrhea over past few days. Pt  with hx of dementia, spinal stenosis, macular degeneration, cva , bedbound patient with decreased po intake. patient without any cough or congestion. patient with multiple episodes of vomiting without any hematemesis.    A/P D/W pt's daughter Sophia ar [phone]    # Leukocytosis/ Pyuria - UTI   # Colitis/colonic diverticulosis   # H/O ESBL   - CTAP : Fat stranding and fluid mesenteric edema noted in the pelvis surrounding   the rectosigmoid bowel loops. Mild rectosigmoid wall thickening though   the signal colon is decompressed. Findings may reflect colitis.Nonspecific urinary bladder wall thickening and surrounding fat   stranding. Correlate with urinalysis for cystitis. Subcutaneous edema and skin thickening posterior to the sacrum. Correlate   for ulcer. Additional findings detailed above.  - no diarrhea since patient came to the hospital , no need to collect stool studies   - fu urine clx E.Coli   - blood clx neg   - ID EVAL Appreciated - Meropenem (d3)     # NINA - resolved   # Hypernatremia - resolved   - Cr 2 >>> 1   - monitor bmp   - avoid nephrotoxic medications   - monitor and correct electrolytes - on d5 do not over correct     # H/O Dementia/ Mood disorder  - supportive care  , speech and swallow eval appreciated ; cw home citalopram     # H/O Spinal stenosis/ CVA/ Functionally quadriplegic    # H/O HTN- HOLD Norvasc 5 md QD/  VAlsartan 160mg QD / Spirinolactone  given soft blood pressure     # H/O Chronic A fib sp watchamm- C/W amiodarone , not on AC     # H/O Sacral wound - eval from wound care- Clean skin with soap and water , pat dry apply triad hydrophilic dressing     dvt/ gi ppx/diet  dispo: acute - has 24/7 care  - poss 24 hrs   family discussion: spoke  daughter Sophia - all questions and concerns addressed  5/25

## 2025-05-27 NOTE — SWALLOW BEDSIDE ASSESSMENT ADULT - ADDITIONAL RECOMMENDATIONS
SLP d/c reconsult PRN
SLP will f/u to ensure safe PO tolerance
SLP will f/u to ensure safe PO tolerance

## 2025-05-27 NOTE — SWALLOW BEDSIDE ASSESSMENT ADULT - ORAL PHASE
Decreased anterior-posterior movement of the bolus/Delayed oral transit time

## 2025-05-28 VITALS
HEART RATE: 68 BPM | DIASTOLIC BLOOD PRESSURE: 60 MMHG | TEMPERATURE: 98 F | OXYGEN SATURATION: 100 % | RESPIRATION RATE: 18 BRPM | SYSTOLIC BLOOD PRESSURE: 103 MMHG

## 2025-05-28 LAB
CULTURE RESULTS: SIGNIFICANT CHANGE UP
CULTURE RESULTS: SIGNIFICANT CHANGE UP
SPECIMEN SOURCE: SIGNIFICANT CHANGE UP
SPECIMEN SOURCE: SIGNIFICANT CHANGE UP

## 2025-05-28 PROCEDURE — 99239 HOSP IP/OBS DSCHRG MGMT >30: CPT

## 2025-05-28 RX ORDER — TAMSULOSIN HYDROCHLORIDE 0.4 MG/1
0.4 CAPSULE ORAL DAILY
Refills: 0 | Status: DISCONTINUED | OUTPATIENT
Start: 2025-05-28 | End: 2025-05-28

## 2025-05-28 RX ORDER — SENNA 187 MG
2 TABLET ORAL
Qty: 60 | Refills: 0
Start: 2025-05-28 | End: 2025-06-26

## 2025-05-28 RX ORDER — TAMSULOSIN HYDROCHLORIDE 0.4 MG/1
1 CAPSULE ORAL
Qty: 30 | Refills: 0
Start: 2025-05-28 | End: 2025-06-26

## 2025-05-28 RX ORDER — POLYETHYLENE GLYCOL 3350 17 G/17G
17 POWDER, FOR SOLUTION ORAL
Qty: 1020 | Refills: 0
Start: 2025-05-28 | End: 2025-06-26

## 2025-05-28 RX ADMIN — AMLODIPINE BESYLATE 5 MILLIGRAM(S): 10 TABLET ORAL at 06:04

## 2025-05-28 RX ADMIN — POLYETHYLENE GLYCOL 3350 17 GRAM(S): 17 POWDER, FOR SOLUTION ORAL at 16:47

## 2025-05-28 RX ADMIN — Medication 1 APPLICATION(S): at 06:05

## 2025-05-28 RX ADMIN — TAMSULOSIN HYDROCHLORIDE 0.4 MILLIGRAM(S): 0.4 CAPSULE ORAL at 11:03

## 2025-05-28 RX ADMIN — POLYETHYLENE GLYCOL 3350 17 GRAM(S): 17 POWDER, FOR SOLUTION ORAL at 06:05

## 2025-05-28 RX ADMIN — AMIODARONE HYDROCHLORIDE 100 MILLIGRAM(S): 50 INJECTION, SOLUTION INTRAVENOUS at 06:05

## 2025-05-28 RX ADMIN — HEPARIN SODIUM 5000 UNIT(S): 1000 INJECTION INTRAVENOUS; SUBCUTANEOUS at 16:47

## 2025-05-28 RX ADMIN — HEPARIN SODIUM 5000 UNIT(S): 1000 INJECTION INTRAVENOUS; SUBCUTANEOUS at 06:05

## 2025-05-28 RX ADMIN — CLOPIDOGREL BISULFATE 75 MILLIGRAM(S): 75 TABLET, FILM COATED ORAL at 11:03

## 2025-05-28 NOTE — DISCHARGE NOTE PROVIDER - HOSPITAL COURSE
89 y/o female presents to the ED with family for increased weakness, AMS,  and watery diarrhea over past few days. Pt  with hx of dementia, spinal stenosis, macular degeneration, cva , bedbound patient with decreased po intake. patient without any cough or congestion. patient with multiple episodes of vomiting without any hematemesis    # Leukocytosis/ Pyuria - UTI   # Colitis/colonic diverticulosis   # H/O ESBL   - CTAP : Fat stranding and fluid mesenteric edema noted in the pelvis surrounding   the rectosigmoid bowel loops. Mild rectosigmoid wall thickening though   the signal colon is decompressed. Findings may reflect colitis. Nonspecific urinary bladder wall thickening and surrounding fat   stranding. Correlate with urinalysis for cystitis. Subcutaneous edema and skin thickening posterior to the sacrum. Correlate   for ulcer. Additional findings detailed above.  - no diarrhea since patient came to the hospital , no need to collect stool studies   - urine clx E.Coli ESBL   - blood clx neg   - ID EVAL Appreciated - completed course of meropenem    # Urinary retention  # Constipation   - bladder scan q6h, straight cath pvr > 300 (so far required x2 straight cath)  - bowel regimen ; pt did have a bowel movement overnight 5/27    # NINA - resolved   # Hypernatremia - resolved     # H/O Dementia/ Mood disorder  - supportive care  , speech and swallow eval appreciated ; cw home citalopram     # H/O Spinal stenosis/ CVA/ Functionally quadriplegic    # H/O HTN- resume Norvasc 5 md QD ; HOLD valsartan/ spironolactone / lasix (per daughter and chart review no history of heart failure)     # H/O Chronic A fib sp watchamm- C/W amiodarone , not on AC     pt seen and examined on the day of discharge  Vitals: HD stable, O2 stable  Gen: appropriate affect, no acute distress  Neuro: no focal defects, no sensory deficits  HEENT: EOMI, no JVD, normocephalic, atraumatic, no scleral icterus  Cardio: RRR, S1 S2 present, no murmurs, rubs, or gallops  Resp: lungs b/l CTA, chest wall intact  Abd: soft, nondistended, nontender  MSK: no gross joint abnormalities, no obvious swelling  Skin: no rashes, no wounds  heme: no ecchymosis or petechiae     Pt was stable for DC. Pt was given strict instructions and return precautions. pt was instructed to return to the ER if symptoms worsen at any time and to return to the ER at any time for any other medical concern. Pt was counseled on continuing home medications and new prescriptions.     I have spent more than 30 minutes of time on the discharge encounter which excludes separately reported services    please note that this is a summary of the medical record. please refer to the EMR for further details. 89 y/o female presents to the ED with family for increased weakness, AMS,  and watery diarrhea over past few days. Pt  with hx of dementia, spinal stenosis, macular degeneration, cva , bedbound patient with decreased po intake. patient without any cough or congestion. patient with multiple episodes of vomiting without any hematemesis    # Leukocytosis/ Pyuria - UTI   # Colitis/colonic diverticulosis   # H/O ESBL   - CTAP : Fat stranding and fluid mesenteric edema noted in the pelvis surrounding   the rectosigmoid bowel loops. Mild rectosigmoid wall thickening though   the signal colon is decompressed. Findings may reflect colitis. Nonspecific urinary bladder wall thickening and surrounding fat   stranding. Correlate with urinalysis for cystitis. Subcutaneous edema and skin thickening posterior to the sacrum. Correlate   for ulcer. Additional findings detailed above.  - no diarrhea since patient came to the hospital , no need to collect stool studies   - urine clx E.Coli ESBL   - blood clx neg   - ID EVAL Appreciated - completed course of meropenem    # Urinary retention  # Constipation   - bladder scan q6h, straight cath pvr > 300 (so far required x2 straight cath) - improved on 5/28 with post void in 140s, no need for straight cath or goodwin cath  - bowel regimen ; pt did have a bowel movement overnight 5/27  -discussed with daughter on the phone on 5/28 regarding the urinary retention. it was improved and flomax was started. we discussed on the phone the initiation of this medication, we also discussed to encourage more PO intake as the daughter was already aware of. we also discussed new bowel regimen of miralax 2x a day and senna at night and adding miralax one addition if no further bowel movement. we also discussed if limited urinary movements to return to the hospital. in the interim, i counseled on following with urology.     # NINA - resolved   # Hypernatremia - resolved     # H/O Dementia/ Mood disorder  - supportive care  , speech and swallow eval appreciated ; cw home citalopram     # H/O Spinal stenosis/ CVA/ Functionally quadriplegic    # H/O HTN- resume Norvasc 5 md QD ; HOLD valsartan/ spironolactone / lasix (per daughter and chart review no history of heart failure)     # H/O Chronic A fib sp watchamm- C/W amiodarone , not on AC     pt seen and examined on the day of discharge  Vitals: HD stable, O2 stable  Gen: appropriate affect, no acute distress  Neuro: no focal defects, no sensory deficits  HEENT: EOMI, no JVD, normocephalic, atraumatic, no scleral icterus  Cardio: RRR, S1 S2 present, no murmurs, rubs, or gallops  Resp: lungs b/l CTA, chest wall intact  Abd: soft, nondistended, nontender  MSK: no gross joint abnormalities, no obvious swelling  Skin: no rashes, no wounds  heme: no ecchymosis or petechiae     Pt was stable for DC. Pt was given strict instructions and return precautions. pt was instructed to return to the ER if symptoms worsen at any time and to return to the ER at any time for any other medical concern. Pt was counseled on continuing home medications and new prescriptions.     I have spent more than 30 minutes of time on the discharge encounter which excludes separately reported services    please note that this is a summary of the medical record. please refer to the EMR for further details.

## 2025-05-28 NOTE — DISCHARGE NOTE NURSING/CASE MANAGEMENT/SOCIAL WORK - PATIENT PORTAL LINK FT
You can access the FollowMyHealth Patient Portal offered by [facility] by registering at the following website: [email or web address]. By joining [facility]’s FollowMyHealth portal, you will also be able to view your health information using other applications (apps) compatible with our system.

## 2025-05-28 NOTE — DISCHARGE NOTE PROVIDER - CARE PROVIDERS DIRECT ADDRESSES
,[email or web address]

## 2025-05-28 NOTE — DISCHARGE NOTE NURSING/CASE MANAGEMENT/SOCIAL WORK - FINANCIAL ASSISTANCE
[facility] provides services at a reduced cost to those who are determined to be eligible through [facility]’s financial assistance program. Information regarding [facility]’s financial assistance program can be found by going to [email or web address] or by calling [phone].

## 2025-05-28 NOTE — DISCHARGE NOTE PROVIDER - NSDCMRMEDTOKEN_GEN_ALL_CORE_FT
amiodarone 100 mg oral tablet: 1 tablet orally once a day  amLODIPine 5 mg oral tablet: 1 orally once a day  citalopram 10 mg oral tablet: 1 orally once a day (at bedtime)  clopidogrel 75 mg oral tablet: 1 orally once a day  folic acid 1 mg oral tablet: 1 orally once a day (at bedtime)  polyethylene glycol 3350 oral powder for reconstitution: 17 gram(s) orally 2 times a day  senna leaf extract oral tablet: 2 tab(s) orally once a day (at bedtime)  tamsulosin 0.4 mg oral capsule: 1 cap(s) orally once a day

## 2025-05-28 NOTE — DISCHARGE NOTE NURSING/CASE MANAGEMENT/SOCIAL WORK - NSDCPEFALRISK_GEN_ALL_CORE
Year Removed: 1900 For information on Fall & Injury Prevention, visit: [email or web address] OR  [email or web address] OR  https://www.cdc.gov/steadi/patient.html

## 2025-05-28 NOTE — DISCHARGE NOTE PROVIDER - CARE PROVIDER_API CALL
José Luis Allen [place]  Urology  [address] [place] [id number]  Phone: [phone]  Fax: [phone]  Follow Up Time:

## 2025-05-28 NOTE — DISCHARGE NOTE PROVIDER - NSDCCPCAREPLAN_GEN_ALL_CORE_FT
PRINCIPAL DISCHARGE DIAGNOSIS  Diagnosis: Acute UTI  Assessment and Plan of Treatment: you were seen in the hospital because of increased weakness, altered mental status and watery diarreha. you were found to have a UTI with a resistant bacteria called e coli ESBL. you were treated with antibiotics. your condition improved. please return to the hospital at any time if your condition worsens.      SECONDARY DISCHARGE DIAGNOSES  Diagnosis: Colitis  Assessment and Plan of Treatment: you also had inflammation of your colon. this could have been the cause of your watery diarrhea and constipation, you were given antibiotics and you improved.    Diagnosis: Urinary retention  Assessment and Plan of Treatment: you did have some urinary retention meaning, inbility to fully empty your bladder. this could be related to the constipation you had as well. and it did improve with laxatives. you were also started on a medication called flomax. it is important that you follow up with the urologist and your PCP to monitor your urine output. please return to the hospital if you cannot urinate or if you have difficulty urinating.    Diagnosis: Constipation  Assessment and Plan of Treatment: see above

## 2025-06-11 NOTE — CDI QUERY NOTE - NSCDIOTHERTXTBX_GEN_ALL_CORE_HH
Clinical documentation and/or evidence of the patient’s presentation, evaluation, and medical management, as evidenced below, may support a diagnosis that is not documented in the medical record.  In order to ensure accurate coding and accuracy of the clinical record, the documentation in this patient’s medical record requires additional clarification.      If you think the supporting documentation and/or clinical evidence supports a more specific diagnosis, please include more specific documentation of a diagnosis associated with these findings in your progress note and/or discharge summary.    The diagnosis of Sepsis was documented in various physician notes but was not included in the Discharge Note. Please review the documentation in the medical record and clarify if the Sepsis diagnosis can be further specified in the Discharge Note as:  •	The diagnosis of sepsis was confirmed and managed through this admission.  •	The diagnosis of sepsis was evaluated and ruled out.  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  Leukocytosis/ Pyuria – UTI… urine clx E.Coli ESBL…  Meropenem (d4)   •	5-26 ID… Treating as cystitis…. Urine Cultures- ESBL E.coli… IV Meropenem 500mg BID. Can D/C abx tomorrow 5/27/2025  •	5-28 DCS…  urine clx E.Coli ESBL - blood clx neg… completed course of meropenem… Diagnosis: Acute UTI…    Labs/VS  •	5-22 WBC 14.19 > 10.78 > 6.92  •	5-22 Lactate Level 2.5 > 1.8  •	5-22 ED T101 (rectal)  /67 RR 18

## 2025-06-11 NOTE — CDI QUERY NOTE - NSCDIOTHERTXTBX2_GEN_ALL_CORE_FT
Clinical documentation and/or evidence of the patient’s presentation, evaluation, and medical management, as evidenced below, may support a diagnosis that is not documented in the medical record.  In order to ensure accurate coding and accuracy of the clinical record, the documentation in this patient’s medical record requires additional clarification.      If you think the supporting documentation and/or clinical evidence supports a more specific diagnosis, please include more specific documentation of a diagnosis associated with these findings in your progress note and/or discharge summary.    Please clarify if the potassium levels below can be further specified as:  •	Hypokalemia was treated.  •	Clinically insignificant potassium level.  •	Other (specify)     Supporting documentation and/or clinical evidence:   Labs   •            5-24 Potassium 3.4  •	5-25 Potassium 3.6    Treatment  •	5-15 potassium chloride 20mEq IVPB x 1 dose

## 2025-06-16 NOTE — CHART NOTE - NSCHARTNOTEFT_GEN_A_CORE
CDI query response:    1. Sepsis not present, leukocytosis present and infection     2. Hypokalemia was present and treated on May 24, 25
Due to "soft" blood pressure, will delay giving Lasix til around 0800
Will order straight cath due to retention on bladder scan
Informed by RN that patient was not able to swallow this morning medications (Furosemide, amiodarone and amlodipine). Speech therapy evaluation is pending but would attempt to give missed medications later. If still not able to tolerate oral will need to consider NGT or switching medication to parental
Lab called for Hg drop from 14.7 to 11.6, likely dilutional.  However, will repeat CBC at 11 and obtain type and screen.
Signing off, please reconsult PRN for GOC or symptoms    ______________  Parag Tim MD  Palliative Medicine  [facility]   of Geriatric and Palliative Medicine  [phone]

## 2025-06-19 NOTE — DISCHARGE NOTE NURSING/CASE MANAGEMENT/SOCIAL WORK - NSDCVIVACCINE_GEN_ALL_CORE_FT
Please notify them that Omaira is out until Monday. Can they wait until then?   No Vaccines Administered.

## 2025-06-24 ENCOUNTER — INPATIENT (INPATIENT)
Facility: HOSPITAL | Age: 89
LOS: 6 days | Discharge: HOME | DRG: 871 | End: 2025-07-01
Attending: STUDENT IN AN ORGANIZED HEALTH CARE EDUCATION/TRAINING PROGRAM | Admitting: FAMILY MEDICINE
Payer: MEDICARE

## 2025-06-24 VITALS
TEMPERATURE: 98 F | WEIGHT: 80.03 LBS | RESPIRATION RATE: 20 BRPM | OXYGEN SATURATION: 95 % | SYSTOLIC BLOOD PRESSURE: 105 MMHG | HEIGHT: 55 IN | HEART RATE: 100 BPM | DIASTOLIC BLOOD PRESSURE: 64 MMHG

## 2025-06-24 DIAGNOSIS — Z95.818 PRESENCE OF OTHER CARDIAC IMPLANTS AND GRAFTS: Chronic | ICD-10-CM

## 2025-06-24 DIAGNOSIS — S82.209A UNSPECIFIED FRACTURE OF SHAFT OF UNSPECIFIED TIBIA, INITIAL ENCOUNTER FOR CLOSED FRACTURE: Chronic | ICD-10-CM

## 2025-06-24 DIAGNOSIS — A41.9 SEPSIS, UNSPECIFIED ORGANISM: ICD-10-CM

## 2025-06-24 LAB
ALBUMIN SERPL ELPH-MCNC: 2.7 G/DL — LOW (ref 3.5–5.2)
ALBUMIN SERPL ELPH-MCNC: SIGNIFICANT CHANGE UP G/DL (ref 3.5–5.2)
ALP SERPL-CCNC: 94 U/L — SIGNIFICANT CHANGE UP (ref 30–115)
ALP SERPL-CCNC: SIGNIFICANT CHANGE UP U/L (ref 30–115)
ALT FLD-CCNC: 8 U/L — SIGNIFICANT CHANGE UP (ref 0–41)
ALT FLD-CCNC: SIGNIFICANT CHANGE UP U/L (ref 0–41)
ANION GAP SERPL CALC-SCNC: 20 MMOL/L — HIGH (ref 7–14)
APPEARANCE UR: ABNORMAL
APTT BLD: 28.1 SEC — SIGNIFICANT CHANGE UP (ref 27–39.2)
AST SERPL-CCNC: 19 U/L — SIGNIFICANT CHANGE UP (ref 0–41)
AST SERPL-CCNC: SIGNIFICANT CHANGE UP U/L (ref 0–41)
BACTERIA # UR AUTO: ABNORMAL /HPF
BASOPHILS # BLD AUTO: 0.05 K/UL — SIGNIFICANT CHANGE UP (ref 0–0.2)
BASOPHILS NFR BLD AUTO: 0.5 % — SIGNIFICANT CHANGE UP (ref 0–2)
BILIRUB SERPL-MCNC: 0.7 MG/DL — SIGNIFICANT CHANGE UP (ref 0.2–1.2)
BILIRUB SERPL-MCNC: SIGNIFICANT CHANGE UP MG/DL (ref 0.2–1.2)
BILIRUB UR-MCNC: NEGATIVE — SIGNIFICANT CHANGE UP
BUN SERPL-MCNC: 31 MG/DL — HIGH (ref 10–20)
BUN SERPL-MCNC: SIGNIFICANT CHANGE UP MG/DL (ref 10–20)
CALCIUM SERPL-MCNC: 8.4 MG/DL — SIGNIFICANT CHANGE UP (ref 8.4–10.5)
CALCIUM SERPL-MCNC: SIGNIFICANT CHANGE UP MG/DL (ref 8.4–10.5)
CHLORIDE SERPL-SCNC: 102 MMOL/L — SIGNIFICANT CHANGE UP (ref 98–110)
CHLORIDE SERPL-SCNC: SIGNIFICANT CHANGE UP MMOL/L (ref 98–110)
CO2 SERPL-SCNC: 20 MMOL/L — SIGNIFICANT CHANGE UP (ref 17–32)
CO2 SERPL-SCNC: SIGNIFICANT CHANGE UP MMOL/L (ref 17–32)
COLOR SPEC: SIGNIFICANT CHANGE UP
CREAT SERPL-MCNC: 1.3 MG/DL — SIGNIFICANT CHANGE UP (ref 0.7–1.5)
CREAT SERPL-MCNC: SIGNIFICANT CHANGE UP MG/DL (ref 0.7–1.5)
DIFF PNL FLD: ABNORMAL
EGFR: 38 ML/MIN/1.73M2 — LOW
EGFR: 38 ML/MIN/1.73M2 — LOW
EGFR: SIGNIFICANT CHANGE UP ML/MIN/1.73M2
EGFR: SIGNIFICANT CHANGE UP ML/MIN/1.73M2
EOSINOPHIL # BLD AUTO: 0.04 K/UL — SIGNIFICANT CHANGE UP (ref 0–0.5)
EOSINOPHIL NFR BLD AUTO: 0.4 % — SIGNIFICANT CHANGE UP (ref 0–6)
EPI CELLS # UR: PRESENT
GLUCOSE BLDC GLUCOMTR-MCNC: 98 MG/DL — SIGNIFICANT CHANGE UP (ref 70–99)
GLUCOSE SERPL-MCNC: 85 MG/DL — SIGNIFICANT CHANGE UP (ref 70–99)
GLUCOSE SERPL-MCNC: SIGNIFICANT CHANGE UP MG/DL (ref 70–99)
GLUCOSE UR QL: NEGATIVE MG/DL — SIGNIFICANT CHANGE UP
HCT VFR BLD CALC: 36.5 % — SIGNIFICANT CHANGE UP (ref 34.5–45)
HGB BLD-MCNC: 11.3 G/DL — LOW (ref 11.5–15.5)
IMM GRANULOCYTES # BLD AUTO: 0.05 K/UL — SIGNIFICANT CHANGE UP (ref 0–0.07)
IMM GRANULOCYTES NFR BLD AUTO: 0.5 % — SIGNIFICANT CHANGE UP (ref 0–0.9)
INR BLD: 1.14 RATIO — SIGNIFICANT CHANGE UP (ref 0.65–1.3)
KETONES UR QL: ABNORMAL MG/DL
LACTATE SERPL-SCNC: 1.3 MMOL/L — SIGNIFICANT CHANGE UP (ref 0.7–2)
LEUKOCYTE ESTERASE UR-ACNC: ABNORMAL
LYMPHOCYTES # BLD AUTO: 1.05 K/UL — SIGNIFICANT CHANGE UP (ref 1–3.3)
LYMPHOCYTES NFR BLD AUTO: 10.3 % — LOW (ref 13–44)
MCHC RBC-ENTMCNC: 27.3 PG — SIGNIFICANT CHANGE UP (ref 27–34)
MCHC RBC-ENTMCNC: 31 G/DL — LOW (ref 32–36)
MCV RBC AUTO: 88.2 FL — SIGNIFICANT CHANGE UP (ref 80–100)
MONOCYTES # BLD AUTO: 0.72 K/UL — SIGNIFICANT CHANGE UP (ref 0–0.9)
MONOCYTES NFR BLD AUTO: 7 % — SIGNIFICANT CHANGE UP (ref 2–14)
NEUTROPHILS # BLD AUTO: 8.32 K/UL — HIGH (ref 1.8–7.4)
NEUTROPHILS NFR BLD AUTO: 81.3 % — HIGH (ref 43–77)
NITRITE UR-MCNC: NEGATIVE — SIGNIFICANT CHANGE UP
NRBC # BLD AUTO: 0 K/UL — SIGNIFICANT CHANGE UP (ref 0–0)
NRBC # FLD: 0 K/UL — SIGNIFICANT CHANGE UP (ref 0–0)
NRBC BLD AUTO-RTO: 0 /100 WBCS — SIGNIFICANT CHANGE UP (ref 0–0)
PH UR: 5.5 — SIGNIFICANT CHANGE UP (ref 5–8)
PLATELET # BLD AUTO: 290 K/UL — SIGNIFICANT CHANGE UP (ref 150–400)
PMV BLD: 9.1 FL — SIGNIFICANT CHANGE UP (ref 7–13)
POTASSIUM SERPL-MCNC: 4.5 MMOL/L — SIGNIFICANT CHANGE UP (ref 3.5–5)
POTASSIUM SERPL-MCNC: SIGNIFICANT CHANGE UP MMOL/L (ref 3.5–5)
POTASSIUM SERPL-SCNC: 4.5 MMOL/L — SIGNIFICANT CHANGE UP (ref 3.5–5)
POTASSIUM SERPL-SCNC: SIGNIFICANT CHANGE UP MMOL/L (ref 3.5–5)
PROT SERPL-MCNC: 6.7 G/DL — SIGNIFICANT CHANGE UP (ref 6–8)
PROT SERPL-MCNC: SIGNIFICANT CHANGE UP G/DL (ref 6–8)
PROT UR-MCNC: 30 MG/DL
PROTHROM AB SERPL-ACNC: 13.5 SEC — HIGH (ref 9.95–12.87)
RBC # BLD: 4.14 M/UL — SIGNIFICANT CHANGE UP (ref 3.8–5.2)
RBC # FLD: 13.4 % — SIGNIFICANT CHANGE UP (ref 10.3–14.5)
RBC CASTS # UR COMP ASSIST: 3 /HPF — SIGNIFICANT CHANGE UP (ref 0–4)
SODIUM SERPL-SCNC: 142 MMOL/L — SIGNIFICANT CHANGE UP (ref 135–146)
SODIUM SERPL-SCNC: SIGNIFICANT CHANGE UP MMOL/L (ref 135–146)
SP GR SPEC: 1.02 — SIGNIFICANT CHANGE UP (ref 1–1.03)
SQUAMOUS # UR AUTO: 2 /HPF — SIGNIFICANT CHANGE UP (ref 0–5)
UROBILINOGEN FLD QL: 1 MG/DL — SIGNIFICANT CHANGE UP (ref 0.2–1)
WBC # BLD: 10.23 K/UL — SIGNIFICANT CHANGE UP (ref 3.8–10.5)
WBC # FLD AUTO: 10.23 K/UL — SIGNIFICANT CHANGE UP (ref 3.8–10.5)
WBC UR QL: 25 /HPF — HIGH (ref 0–5)

## 2025-06-24 PROCEDURE — 85027 COMPLETE CBC AUTOMATED: CPT

## 2025-06-24 PROCEDURE — 82962 GLUCOSE BLOOD TEST: CPT

## 2025-06-24 PROCEDURE — 99291 CRITICAL CARE FIRST HOUR: CPT | Mod: FS

## 2025-06-24 PROCEDURE — 80048 BASIC METABOLIC PNL TOTAL CA: CPT

## 2025-06-24 PROCEDURE — 36415 COLL VENOUS BLD VENIPUNCTURE: CPT

## 2025-06-24 PROCEDURE — 84100 ASSAY OF PHOSPHORUS: CPT

## 2025-06-24 PROCEDURE — 74177 CT ABD & PELVIS W/CONTRAST: CPT | Mod: 26

## 2025-06-24 PROCEDURE — 80202 ASSAY OF VANCOMYCIN: CPT

## 2025-06-24 PROCEDURE — 71045 X-RAY EXAM CHEST 1 VIEW: CPT | Mod: 26

## 2025-06-24 PROCEDURE — 99497 ADVNCD CARE PLAN 30 MIN: CPT | Mod: 25

## 2025-06-24 PROCEDURE — 92610 EVALUATE SWALLOWING FUNCTION: CPT | Mod: GN

## 2025-06-24 PROCEDURE — 83735 ASSAY OF MAGNESIUM: CPT

## 2025-06-24 PROCEDURE — 71275 CT ANGIOGRAPHY CHEST: CPT

## 2025-06-24 PROCEDURE — 99223 1ST HOSP IP/OBS HIGH 75: CPT | Mod: FS

## 2025-06-24 RX ORDER — VANCOMYCIN HCL IN 5 % DEXTROSE 1.5G/250ML
1000 PLASTIC BAG, INJECTION (ML) INTRAVENOUS ONCE
Refills: 0 | Status: COMPLETED | OUTPATIENT
Start: 2025-06-24 | End: 2025-06-24

## 2025-06-24 RX ORDER — VANCOMYCIN HCL IN 5 % DEXTROSE 1.5G/250ML
500 PLASTIC BAG, INJECTION (ML) INTRAVENOUS EVERY 24 HOURS
Refills: 0 | Status: DISCONTINUED | OUTPATIENT
Start: 2025-06-24 | End: 2025-06-24

## 2025-06-24 RX ORDER — ENOXAPARIN SODIUM 100 MG/ML
40 INJECTION SUBCUTANEOUS ONCE
Refills: 0 | Status: COMPLETED | OUTPATIENT
Start: 2025-06-24 | End: 2025-06-25

## 2025-06-24 RX ORDER — CITALOPRAM 20 MG/1
10 TABLET ORAL DAILY
Refills: 0 | Status: DISCONTINUED | OUTPATIENT
Start: 2025-06-24 | End: 2025-06-25

## 2025-06-24 RX ORDER — ENOXAPARIN SODIUM 100 MG/ML
40 INJECTION SUBCUTANEOUS ONCE
Refills: 0 | Status: DISCONTINUED | OUTPATIENT
Start: 2025-06-24 | End: 2025-06-24

## 2025-06-24 RX ORDER — ONDANSETRON HCL/PF 4 MG/2 ML
4 VIAL (ML) INJECTION EVERY 8 HOURS
Refills: 0 | Status: DISCONTINUED | OUTPATIENT
Start: 2025-06-24 | End: 2025-07-01

## 2025-06-24 RX ORDER — CEFEPIME 2 G/20ML
2000 INJECTION, POWDER, FOR SOLUTION INTRAVENOUS ONCE
Refills: 0 | Status: COMPLETED | OUTPATIENT
Start: 2025-06-24 | End: 2025-06-24

## 2025-06-24 RX ORDER — MAGNESIUM, ALUMINUM HYDROXIDE 200-200 MG
30 TABLET,CHEWABLE ORAL EVERY 4 HOURS
Refills: 0 | Status: DISCONTINUED | OUTPATIENT
Start: 2025-06-24 | End: 2025-06-25

## 2025-06-24 RX ORDER — CEFEPIME 2 G/20ML
1000 INJECTION, POWDER, FOR SOLUTION INTRAVENOUS EVERY 12 HOURS
Refills: 0 | Status: DISCONTINUED | OUTPATIENT
Start: 2025-06-24 | End: 2025-06-25

## 2025-06-24 RX ORDER — AMIODARONE HYDROCHLORIDE 50 MG/ML
100 INJECTION, SOLUTION INTRAVENOUS DAILY
Refills: 0 | Status: DISCONTINUED | OUTPATIENT
Start: 2025-06-24 | End: 2025-06-25

## 2025-06-24 RX ORDER — MELATONIN 5 MG
3 TABLET ORAL AT BEDTIME
Refills: 0 | Status: DISCONTINUED | OUTPATIENT
Start: 2025-06-24 | End: 2025-06-24

## 2025-06-24 RX ORDER — ACETAMINOPHEN 500 MG/5ML
975 LIQUID (ML) ORAL ONCE
Refills: 0 | Status: COMPLETED | OUTPATIENT
Start: 2025-06-24 | End: 2025-06-24

## 2025-06-24 RX ORDER — ACETAMINOPHEN 500 MG/5ML
650 LIQUID (ML) ORAL EVERY 6 HOURS
Refills: 0 | Status: DISCONTINUED | OUTPATIENT
Start: 2025-06-24 | End: 2025-06-24

## 2025-06-24 RX ORDER — CEFEPIME 2 G/20ML
2000 INJECTION, POWDER, FOR SOLUTION INTRAVENOUS EVERY 24 HOURS
Refills: 0 | Status: DISCONTINUED | OUTPATIENT
Start: 2025-06-24 | End: 2025-06-24

## 2025-06-24 RX ORDER — FOLIC ACID 1 MG/1
1 TABLET ORAL AT BEDTIME
Refills: 0 | Status: DISCONTINUED | OUTPATIENT
Start: 2025-06-24 | End: 2025-06-25

## 2025-06-24 RX ORDER — ACETAMINOPHEN 500 MG/5ML
650 LIQUID (ML) ORAL EVERY 4 HOURS
Refills: 0 | Status: DISCONTINUED | OUTPATIENT
Start: 2025-06-24 | End: 2025-07-01

## 2025-06-24 RX ORDER — CLOPIDOGREL BISULFATE 75 MG/1
75 TABLET, FILM COATED ORAL DAILY
Refills: 0 | Status: DISCONTINUED | OUTPATIENT
Start: 2025-06-24 | End: 2025-06-25

## 2025-06-24 RX ORDER — SODIUM CHLORIDE 9 G/1000ML
2000 INJECTION, SOLUTION INTRAVENOUS ONCE
Refills: 0 | Status: COMPLETED | OUTPATIENT
Start: 2025-06-24 | End: 2025-06-24

## 2025-06-24 RX ADMIN — Medication 250 MILLIGRAM(S): at 15:16

## 2025-06-24 RX ADMIN — Medication 975 MILLIGRAM(S): at 15:17

## 2025-06-24 RX ADMIN — CEFEPIME 100 MILLIGRAM(S): 2 INJECTION, POWDER, FOR SOLUTION INTRAVENOUS at 15:16

## 2025-06-24 RX ADMIN — SODIUM CHLORIDE 2000 MILLILITER(S): 9 INJECTION, SOLUTION INTRAVENOUS at 15:17

## 2025-06-24 RX ADMIN — SODIUM CHLORIDE 2000 MILLILITER(S): 9 INJECTION, SOLUTION INTRAVENOUS at 17:00

## 2025-06-24 NOTE — ED PROVIDER NOTE - CARE PLAN
Principal Discharge DX:	Sacral decubitus ulcer  Secondary Diagnosis:	Sepsis, unspecified organism   1

## 2025-06-24 NOTE — PATIENT PROFILE ADULT - FALL HARM RISK - HARM RISK INTERVENTIONS

## 2025-06-24 NOTE — H&P ADULT - NSHPLABSRESULTS_GEN_ALL_CORE
11.3   10.23 )-----------( 290      ( 24 Jun 2025 14:55 )             36.5   Urinalysis Basic - ( 24 Jun 2025 15:32 )    Color: x / Appearance: x / SG: x / pH: x  Gluc: 85 mg/dL / Ketone: x  / Bili: x / Urobili: x   Blood: x / Protein: x / Nitrite: x   Leuk Esterase: x / RBC: x / WBC x   Sq Epi: x / Non Sq Epi: x / Bacteria: x    06-24    142  |  102  |  31[H]  ----------------------------<  85  4.5   |  20  |  1.3    Ca    8.4      24 Jun 2025 15:32    TPro  6.7  /  Alb  2.7[L]  /  TBili  0.7  /  DBili  x   /  AST  19  /  ALT  8   /  AlkPhos  94  06-24    < from: CT Abdomen and Pelvis w/ IV Cont (06.24.25 @ 17:41) >    IMPRESSION:    Subcutaneous edema and skin thickening with a focus of air noted   posterior to the sacrum, consistent with known history of sacral ulcer.   No evidence of ostial myelitis.    Questionable filling defect in the right lower lobe segmental and   subsegmental pulmonary artery branches. Findings may reflect a pulmonary   embolus versus inadequate contrast opacification of the distal vessels.   CTA chest may be obtained for further evaluation if there is high   clinical suspicion.    Nonspecific fat stranding in the region of the rectosigmoid colon, which   may represent colitis.    < end of copied text >

## 2025-06-24 NOTE — ED PROVIDER NOTE - ATTENDING APP SHARED VISIT CONTRIBUTION OF CARE
89 y/o female with hx of dementia, spinal stenosis, macular degeneration, CVA , sacral decub, bedbound presents to the ED with family for fever and worsening sacral decub with foul smell and discharge. Pt is unable to provide history. On exam, pt in NAD, awake, lungs CTA B/L, CV S1S2 regular, abdomen soft/NT/ND/(+)BS, back (+) unstageble sacral decub with foul smell. Will do labs, ABX/IVF, CT and reevaluate.

## 2025-06-24 NOTE — H&P ADULT - ASSESSMENT
89 F with PMHx  of dementia (nonverbal), spinal stenosis, CVA (bedbound), macular degeneration, non healing sacral ulcer presents to ER with daughter for concern for wound infection.    # sepsis   # infected sacral wound  - admit to medicine    - IVF,  IV abx : vanc / Cefepime   - surgery consult   - wound care consult   - f/u labs, cultures     # r/o PE   -  CT - Questionable filling defect in the right lower lobe segmental and   subsegmental pulmonary artery branches.  - received dose of tx lovenox tonight will obtain CTA tomorrow, pt had IV contrast dose today and gfr 38     # malnutrition   # dysphagia   - speech and swallow consult   - nutrition consult      #dvt ppx - lovenox   # gi ppx   # code status - DNR/DNI, will consult palliative care  89 F with PMHx  of dementia (nonverbal), spinal stenosis, CVA (bedbound), macular degeneration, non healing sacral ulcer presents to ER with daughter for concern for wound infection.    # sepsis   # infected sacral wound  - admit to medicine    - IVF,  IV abx : vanc / Cefepime   - surgery consult   - wound care consult   - f/u labs, cultures     # r/o PE   -  CT - Questionable filling defect in the right lower lobe segmental and   subsegmental pulmonary artery branches.  - received dose of tx lovenox tonight will obtain CTA tomorrow, pt had IV contrast dose today and gfr 38     # malnutrition   # dysphagia   - speech and swallow consult   - nutrition consult      #dvt ppx - lovenox   # gi ppx   # code status - DNR/DNI, will consult palliative care   overall prognosis poor  family considering hospice

## 2025-06-24 NOTE — ED PROVIDER NOTE - NS ED ATTENDING STATEMENT MOD
This was a shared visit with the CARTER. I reviewed and verified the documentation. I have personally provided the amount of critical care time documented below excluding time spent on separate procedures.

## 2025-06-24 NOTE — ED ADULT NURSE NOTE - NSFALLRISKINTERV_ED_ALL_ED

## 2025-06-24 NOTE — ED PROVIDER NOTE - PROGRESS NOTE DETAILS
Pt signed out to Dr. Cha pending CT, reevaluation and dispo. Communicated CT findings with Dr. Luevano hospitalist will give dose of Lovenox now and will follow-up with CTA tomorrow.

## 2025-06-24 NOTE — ED PROVIDER NOTE - OBJECTIVE STATEMENT
89-year-old female bedbound dementia stroke hypertension chronic A-fib presents for infected sacral wound and known sacral wound is a nurse appreciated worsening discharge and malodor today

## 2025-06-24 NOTE — H&P ADULT - HISTORY OF PRESENT ILLNESS
pt is a 89 F with PMHx  of dementia (nonverbal), spinal stenosis, CVA (bedbound), macular degeneration, non healing sacral ulcer presents to ER with daughter for concern for wound infection. Pt is bedbound and non verbal, collateral obtained from daughter states pt has non healing sacral ulcer seen today by wound care nurse p/w wound with malodorous drainage. + fevers in ER   Daughter also states pt with + dysphagia the past 2 days, unable to swallow purees.     pt is a 89 F with PMHx  of dementia (nonverbal), spinal stenosis, CVA (bedbound), macular degeneration, non healing sacral ulcer presents to ER with daughter for concern for wound infection. Pt is bedbound and non verbal, collateral obtained from daughter states pt has non healing sacral ulcer seen today by wound care nurse p/w wound with malodorous drainage. + fevers in ER   Daughter also states pt with + dysphagia the past 2 days, unable to swallow purees.    family considering hospice

## 2025-06-24 NOTE — ED PROVIDER NOTE - PHYSICAL EXAMINATION
Physical Exam    Vital Signs: I have reviewed the initial vital signs.  Constitutional: appears stated age, no acute distress  Eyes: Conjunctiva pink, Sclera clear,  Cardiovascular: S1 and S2, regular rate, regular rhythm, well-perfused extremities, radial pulses equal and 2+, pedal pulses 2+ and equal  Respiratory: unlabored respiratory effort, clear to auscultation bilaterally no wheezing, rales and rhonchi  Gastrointestinal: soft, non-tender abdomen, no pulsatile mass, normal bowl sounds  Musculoskeletal: supple neck, no lower extremity edema, no midline tenderness  Integumentary: Sacral wound unstageable probes to bone, discharge malodorous  Neurologic: awake, alert,

## 2025-06-24 NOTE — ED PROVIDER NOTE - CLINICAL SUMMARY MEDICAL DECISION MAKING FREE TEXT BOX
Patient signed out to me pending CT .  Results of CT noted.  Patient was given antibiotics.  Also concern for filling defect.  Hospitalist aware.  Will give Lovenox and follow-up with a CTA tomorrow.

## 2025-06-24 NOTE — H&P ADULT - NSHPPHYSICALEXAM_GEN_ALL_CORE
ICU Vital Signs Last 24 Hrs  T(C): 38.3 (24 Jun 2025 14:32), Max: 38.3 (24 Jun 2025 14:32)  T(F): 101 (24 Jun 2025 14:32), Max: 101 (24 Jun 2025 14:32)  HR: 90 (24 Jun 2025 17:10) (90 - 100)  BP: 132/82 (24 Jun 2025 17:17) (105/64 - 132/82)  BP(mean): --  ABP: --  ABP(mean): --  RR: 18 (24 Jun 2025 17:10) (18 - 20)  SpO2: 95% (24 Jun 2025 17:10) (95% - 95%)    O2 Parameters below as of 24 Jun 2025 17:10  Patient On (Oxygen Delivery Method): room air    Constitutional: appears stated age, no acute distress  Eyes: Conjunctiva pink, Sclera clear,  Cardiovascular: S1 and S2, regular rate, regular rhythm, well-perfused extremities, radial pulses equal and 2+, pedal pulses 2+ and equal  Respiratory: unlabored respiratory effort, clear to auscultation bilaterally no wheezing, rales and rhonchi  Gastrointestinal: soft, non-tender abdomen, no pulsatile mass, normal bowl sounds  Musculoskeletal: supple neck, no lower extremity edema, no midline tenderness  Integumentary: Sacral wound unstageable probes to bone, discharge malodorous

## 2025-06-24 NOTE — ED PROVIDER NOTE - CRITICAL CARE ATTENDING CONTRIBUTION TO CARE
89 y/o female with hx of dementia, spinal stenosis, macular degeneration, CVA , sacral decub, bedbound presents to the ED with family for fever and worsening sacral decub with foul smell and discharge. Pt is unable to provide history. On exam, pt in NAD, awake, lungs CTA B/L, CV S1S2 regular, abdomen soft/NT/ND/(+)BS, back (+) unstageble sacral decub with foul smell. Will do labs, ABX/IVF, CT and reevaluate.

## 2025-06-24 NOTE — H&P ADULT - CONVERSATION DETAILS
Current plan of care, and prognosis were discussed with daughter in details, all option were discussed in details  including CPR procedure, shock procedure, and intubation procedure.   Explained that duration of machines/inbutaion/pressor are unknown, and they are based on the underline issue.  daughter opted for DNR/DNI with full understanding of what it involves in details  time 30min

## 2025-06-25 DIAGNOSIS — Z71.89 OTHER SPECIFIED COUNSELING: ICD-10-CM

## 2025-06-25 DIAGNOSIS — Z51.5 ENCOUNTER FOR PALLIATIVE CARE: ICD-10-CM

## 2025-06-25 LAB
ANION GAP SERPL CALC-SCNC: 15 MMOL/L — HIGH (ref 7–14)
BUN SERPL-MCNC: 25 MG/DL — HIGH (ref 10–20)
CALCIUM SERPL-MCNC: 8.2 MG/DL — LOW (ref 8.4–10.5)
CHLORIDE SERPL-SCNC: 104 MMOL/L — SIGNIFICANT CHANGE UP (ref 98–110)
CO2 SERPL-SCNC: 24 MMOL/L — SIGNIFICANT CHANGE UP (ref 17–32)
CREAT SERPL-MCNC: 1 MG/DL — SIGNIFICANT CHANGE UP (ref 0.7–1.5)
EGFR: 52 ML/MIN/1.73M2 — LOW
EGFR: 52 ML/MIN/1.73M2 — LOW
GLUCOSE SERPL-MCNC: 63 MG/DL — LOW (ref 70–99)
HCT VFR BLD CALC: 36.4 % — SIGNIFICANT CHANGE UP (ref 34.5–45)
HGB BLD-MCNC: 11.1 G/DL — LOW (ref 11.5–15.5)
MAGNESIUM SERPL-MCNC: 2 MG/DL — SIGNIFICANT CHANGE UP (ref 1.8–2.4)
MCHC RBC-ENTMCNC: 27 PG — SIGNIFICANT CHANGE UP (ref 27–34)
MCHC RBC-ENTMCNC: 30.5 G/DL — LOW (ref 32–36)
MCV RBC AUTO: 88.6 FL — SIGNIFICANT CHANGE UP (ref 80–100)
NRBC # BLD AUTO: 0 K/UL — SIGNIFICANT CHANGE UP (ref 0–0)
NRBC # FLD: 0 K/UL — SIGNIFICANT CHANGE UP (ref 0–0)
NRBC BLD AUTO-RTO: 0 /100 WBCS — SIGNIFICANT CHANGE UP (ref 0–0)
PHOSPHATE SERPL-MCNC: 2.7 MG/DL — SIGNIFICANT CHANGE UP (ref 2.1–4.9)
PLATELET # BLD AUTO: 261 K/UL — SIGNIFICANT CHANGE UP (ref 150–400)
PMV BLD: 9.4 FL — SIGNIFICANT CHANGE UP (ref 7–13)
POTASSIUM SERPL-MCNC: 3.5 MMOL/L — SIGNIFICANT CHANGE UP (ref 3.5–5)
POTASSIUM SERPL-SCNC: 3.5 MMOL/L — SIGNIFICANT CHANGE UP (ref 3.5–5)
RBC # BLD: 4.11 M/UL — SIGNIFICANT CHANGE UP (ref 3.8–5.2)
RBC # FLD: 13.5 % — SIGNIFICANT CHANGE UP (ref 10.3–14.5)
SODIUM SERPL-SCNC: 143 MMOL/L — SIGNIFICANT CHANGE UP (ref 135–146)
VANCOMYCIN TROUGH SERPL-MCNC: 9.9 UG/ML — SIGNIFICANT CHANGE UP (ref 5–10)
WBC # BLD: 10.69 K/UL — HIGH (ref 3.8–10.5)
WBC # FLD AUTO: 10.69 K/UL — HIGH (ref 3.8–10.5)

## 2025-06-25 PROCEDURE — 99223 1ST HOSP IP/OBS HIGH 75: CPT

## 2025-06-25 PROCEDURE — 71275 CT ANGIOGRAPHY CHEST: CPT | Mod: 26

## 2025-06-25 PROCEDURE — 99233 SBSQ HOSP IP/OBS HIGH 50: CPT

## 2025-06-25 PROCEDURE — 99221 1ST HOSP IP/OBS SF/LOW 40: CPT

## 2025-06-25 RX ORDER — MEROPENEM 1 G/30ML
500 INJECTION INTRAVENOUS EVERY 12 HOURS
Refills: 0 | Status: DISCONTINUED | OUTPATIENT
Start: 2025-06-25 | End: 2025-06-25

## 2025-06-25 RX ORDER — SCOPOLAMINE 1 MG/3D
1 PATCH, EXTENDED RELEASE TRANSDERMAL
Refills: 0 | Status: DISCONTINUED | OUTPATIENT
Start: 2025-06-25 | End: 2025-07-01

## 2025-06-25 RX ORDER — DIAZEPAM 2 MG/1
2.5 TABLET ORAL EVERY 4 HOURS
Refills: 0 | Status: DISCONTINUED | OUTPATIENT
Start: 2025-06-25 | End: 2025-07-01

## 2025-06-25 RX ORDER — LORAZEPAM 4 MG/ML
0.5 VIAL (ML) INJECTION EVERY 4 HOURS
Refills: 0 | Status: DISCONTINUED | OUTPATIENT
Start: 2025-06-25 | End: 2025-06-25

## 2025-06-25 RX ADMIN — SCOPOLAMINE 1 PATCH: 1 PATCH, EXTENDED RELEASE TRANSDERMAL at 14:21

## 2025-06-25 RX ADMIN — Medication 1 APPLICATION(S): at 05:15

## 2025-06-25 RX ADMIN — SCOPOLAMINE 1 PATCH: 1 PATCH, EXTENDED RELEASE TRANSDERMAL at 22:29

## 2025-06-25 RX ADMIN — ENOXAPARIN SODIUM 40 MILLIGRAM(S): 100 INJECTION SUBCUTANEOUS at 05:16

## 2025-06-25 RX ADMIN — DIAZEPAM 2.5 MILLIGRAM(S): 2 TABLET ORAL at 22:46

## 2025-06-25 NOTE — CONSULT NOTE ADULT - SUBJECTIVE AND OBJECTIVE BOX
Patient  is a 89 F with PMHx  of dementia (nonverbal), spinal stenosis, CVA (bedbound), macular degeneration, non healing sacral ulcer presents to ER with daughter for concern for wound infection. Pt is bedbound and non verbal, collateral obtained from daughter states pt has non healing sacral ulcer seen today by wound care nurse p/w wound with malodorous drainage. + fevers in ER   Daughter also states pt with + dysphagia the past 2 days, unable to swallow purees.    family considering hospice     PAST MEDICAL & SURGICAL HISTORY:  HTN (hypertension)  CVA (cerebrovascular accident)  Dementia  Chronic atrial fibrillation  History of macular degeneration  Arthritis  Presence of Watchman left atrial appendage closure device  Fracture, tibia and fibula    REVIEW OF SYSTEMS: Pt unable to offer    MEDICATIONS  (STANDING):  chlorhexidine 2% Cloths 1 Application(s) Topical <User Schedule>  scopolamine 1 mG/72 Hr(s) Patch 1 Patch Transdermal every 72 hours    MEDICATIONS  (PRN):  acetaminophen  Suppository .. 650 milliGRAM(s) Rectal every 4 hours PRN Temp greater or equal to 38C (100.4F)  LORazepam   Injectable 0.5 milliGRAM(s) IV Push every 4 hours PRN Anxiety  morphine  - Injectable 2 milliGRAM(s) IV Push every 2 hours PRN Moderate pain (4-6), Severe pain (7-10), Respiratory rate greater than 22  ondansetron Injectable 4 milliGRAM(s) IV Push every 8 hours PRN Nausea and/or Vomiting      Allergies  aspirin (Unknown)  penicillin (Unknown)    Intolerances        SOCIAL HISTORY:     FAMILY HISTORY:       PHYSICAL EXAM:  Vital Signs Last 24 Hrs  T(C): 36.6 (25 Jun 2025 04:52), Max: 38.3 (24 Jun 2025 14:32)  T(F): 97.9 (25 Jun 2025 04:52), Max: 101 (24 Jun 2025 14:32)  HR: 70 (25 Jun 2025 04:52) (61 - 100)  BP: 108/69 (25 Jun 2025 04:52) (105/64 - 135/69)  BP(mean): --  RR: 18 (25 Jun 2025 04:52) (18 - 20)  SpO2: 97% (25 Jun 2025 04:52) (95% - 97%)    Parameters below as of 24 Jun 2025 21:27  Patient On (Oxygen Delivery Method): room air    General : NAD    HEENT:  NC/AT, PERRL, EOMI, sclera clear, mucosa moist, throat clear, trachea midline, neck supple  Cardiovascular: RRR   Respiratory:  equal chest rise  Gastrointestinal : Soft NT/ND (+)BS, incontinence    Neurology:  Weakened strength & sensation  Psych: calm  Musculoskeletal:  limited   Skin:   Pressure injury       LABS/ CULTURES/ RADIOLOGY:                        11.1   10.69 )-----------( 261      ( 25 Jun 2025 07:23 )             36.4       143  |  104  |  25  ----------------------------<  63      [06-25-25 @ 07:23]  3.5   |  24  |  1.0        Ca     8.2     [06-25-25 @ 07:23]      Mg     2.0     [06-25-25 @ 07:23]      Phos  2.7     [06-25-25 @ 07:23]    TPro  6.7  /  Alb  2.7  /  TBili  0.7  /  DBili  x   /  AST  19  /  ALT  8   /  AlkPhos  94  [06-24-25 @ 15:32]    PT/INR: PT 13.50, INR 1.14       [06-24-25 @ 14:55]  PTT: 28.1       [06-24-25 @ 14:55]

## 2025-06-25 NOTE — SWALLOW BEDSIDE ASSESSMENT ADULT - SLP PERTINENT HISTORY OF CURRENT PROBLEM
pt is a 89 F with PMHx  of dementia (nonverbal), spinal stenosis, CVA (bedbound), macular degeneration, non healing sacral ulcer presents to ER with daughter for concern for wound infection. Pt is bedbound and non verbal, collateral obtained from daughter states pt has non healing sacral ulcer seen today by wound care nurse p/w wound with malodorous drainage. + fevers in ER

## 2025-06-25 NOTE — HOSPICE CARE NOTE - CONVESATION DETAILS
Attempted to reach patient's daughter Sophia Reyes [phone] several times, call not going through. will try again before the end of day.

## 2025-06-25 NOTE — PROGRESS NOTE ADULT - ASSESSMENT
89 F with PMHx  of dementia (nonverbal), spinal stenosis, CVA (bedbound), macular degeneration, non healing sacral ulcer presents to ER with daughter for concern for wound infection.    # sepsis   # infected sacral wound  - admit to medicine    - IVF,  IV abx : vanc / Cefepime -> stopped abx on 6/25 for CMO  - surgery consult - i spoke with TRINIDAD Renteria from Surgery, will not have anymore surgical intervention  - wound care consult   - f/u labs, cultures     # r/o PE   -  CT - Questionable filling defect in the right lower lobe segmental and   subsegmental pulmonary artery branches.  -CTA positive for subsegmental PE, will not do AC and spoke with daughter regarding this, pt is CMO    # malnutrition   # dysphagia   - speech and swallow consult   - CMO    #dvt ppx - lovenox   # gi ppx   # code status - DNR/DNI, spoke with Dr. Tim from palliative and he is aware  CMO/Hospice     ---PB Handoff Note---    Pending/Follow up items (tests, labs, procedures,consults):    Indication for continued inpatient management:    Goals of Care note:     Family contact:  Dispo (home, SNF, etc):    Prepare for DC order [x] - updated FLO is:   DC provider note prep:    -------------------------------Time spent-----------------------------------------------------------------------  Initial visit:             mins [ ] -- 55-74 mins [ ]  Subsequent visit: 50-79 mins[ ]    -- 35-49mins [ ]     --------------------------------# and complexity of problems---------------------------------------------  [x] chronic illness with severe exacerbation , progression, treatment side effect  [ ] acute or chronic illness with threat to life or bodily funtion                         --------------------------------Complexity of data reviewed ----------------------------------------------  The Patients complexity of data reviewed  is Low [ ] Moderate [ ] High [x ] due to the following:   A:      Reviewed prior external records [ ]      Considering/ Ordered a unique test:  Labs [x ] Imaging [x ] Stress Test  [ ] Other: Specify [ ]      Reviewed each unique test result [x ]      Assessment requiring an independent historian [ ]  B:       Independent interpretation of:   X-Ray [ ] EKG [ ]  Other: Specify  [ ]  C:       Discussion of management of tests with clinician outside of my group [x ] as above    -------------------------------------Risk of Morbidity-------------------------------------------------------  The Patients risk of morbidity is Low [ ] Moderate [x ] High [ ] due to the following:   Mod:  Prescription Drug Management [x ]  Decision regarding elective or emergent: minor surgery [ ] major surgery [ ]  Decision regarding hospitalization or escalation of hospital-level care [ ]  SDOH: Hearing/Vision impaired [ ] Food insecurity [ ] Homelessness/unsafe condition [ ]   Financial strain [ ] un/underemployed [ ] Lack of Transport [ ]  High:  DNR or De-Escalation of care because of poor prognosis [ ]  Drug Therapy requiring intensive monitoring for toxicity [ ]  Parenteral controlled substance [ ]  ------------------------------------------------------------------------------------------------------------------

## 2025-06-25 NOTE — SWALLOW BEDSIDE ASSESSMENT ADULT - SWALLOW EVAL: DIAGNOSIS
Not appropriate for PO intake at this time. Increased lethargy. Decreased arousability. Poor oral hygiene.

## 2025-06-25 NOTE — CONSULT NOTE ADULT - SUBJECTIVE AND OBJECTIVE BOX
INFECTIOUS DISEASE CONSULT NOTE    Patient is a 89y old  Female who presents with a chief complaint of sacral ulcer (25 Jun 2025 10:43)    HPI:  pt is a 89 F with PMHx  of dementia (nonverbal), spinal stenosis, CVA (bedbound), macular degeneration, non healing sacral ulcer presents to ER with daughter for concern for wound infection. Pt is bedbound and non verbal, collateral obtained from daughter states pt has non healing sacral ulcer seen today by wound care nurse p/w wound with malodorous drainage. + fevers in ER   Daughter also states pt with + dysphagia the past 2 days, unable to swallow purees.    family considering hospice   (24 Jun 2025 20:23)         Prior hospital charts reviewed [Yes]  Primary team notes reviewed [Yes]  Other consultant notes reviewed [Yes]    REVIEW OF SYSTEMS:      PAST MEDICAL & SURGICAL HISTORY:  HTN (hypertension)      CVA (cerebrovascular accident)      Dementia      Chronic atrial fibrillation      History of macular degeneration      Arthritis      Presence of Watchman left atrial appendage closure device      Fracture, tibia and fibula          SOCIAL HISTORY:  - Born in _____, migrated to [place] in 19XX  - Currently working as / Retired  - Lives with _____; no pets  - No recent travel  - Denies tobacco use  - Denies alcohol use  - Denies illicit drug use  - Currently sexually active, has one male/female sexual partner    FAMILY HISTORY:      Allergies:  aspirin (Unknown)  penicillin (Unknown)      ANTIMICROBIALS:  cefepime   IVPB 1000 every 12 hours      ANTIMICROBIALS (past 90 days):  MEDICATIONS  (STANDING):  cefepime   IVPB   100 mL/Hr IV Intermittent (06-24-25 @ 15:16)    vancomycin  IVPB.   250 mL/Hr IV Intermittent (06-24-25 @ 15:16)        OTHER MEDS:   MEDICATIONS  (STANDING):  acetaminophen  Suppository .. 650 every 4 hours PRN  aluminum hydroxide/magnesium hydroxide/simethicone Suspension 30 every 4 hours PRN  aMIOdarone    Tablet 100 daily  citalopram 10 daily  clopidogrel Tablet 75 daily  ondansetron Injectable 4 every 8 hours PRN      VITALS:  Vital Signs Last 24 Hrs  T(F): 97.9 (06-25-25 @ 04:52), Max: 101 (06-24-25 @ 14:32)    Vital Signs Last 24 Hrs  HR: 70 (06-25-25 @ 04:52) (61 - 100)  BP: 108/69 (06-25-25 @ 04:52) (105/64 - 135/69)  RR: 18 (06-25-25 @ 04:52)  SpO2: 97% (06-25-25 @ 04:52) (95% - 97%)  Wt(kg): --    EXAM:    Labs:                        11.1   10.69 )-----------( 261      ( 25 Jun 2025 07:23 )             36.4     06-25    143  |  104  |  25[H]  ----------------------------<  63[L]  3.5   |  24  |  1.0    Ca    8.2[L]      25 Jun 2025 07:23  Phos  2.7     06-25  Mg     2.0     06-25    TPro  6.7  /  Alb  2.7[L]  /  TBili  0.7  /  DBili  x   /  AST  19  /  ALT  8   /  AlkPhos  94  06-24      WBC Trend:  WBC Count: 10.69 (06-25-25 @ 07:23)  WBC Count: 10.23 (06-24-25 @ 14:55)      Auto Neutrophil #: 8.32 K/uL (06-24-25 @ 14:55)      Creatine Trend:  Creatinine: 1.0 (06-25)  Creatinine: 1.3 (06-24)  Creatinine: TNP (06-24)      Liver Biochemical Testing Trend:  Alanine Aminotransferase (ALT/SGPT): 8 (06-24)  Alanine Aminotransferase (ALT/SGPT): TNP (06-24)  Alanine Aminotransferase (ALT/SGPT): 10 (05-22)  Aspartate Aminotransferase (AST/SGOT): 19 (06-24-25 @ 15:32)  Aspartate Aminotransferase (AST/SGOT): TNP (06-24-25 @ 14:55)  Aspartate Aminotransferase (AST/SGOT): 25 (05-22-25 @ 20:30)  Bilirubin Total: 0.7 (06-24)  Bilirubin Total: TNP (06-24)  Bilirubin Total: 0.8 (05-22)      Trend LDH      Auto Eosinophil %: 0.4 % (06-24-25 @ 14:55)      Urinalysis Basic - ( 25 Jun 2025 07:23 )    Color: x / Appearance: x / SG: x / pH: x  Gluc: 63 mg/dL / Ketone: x  / Bili: x / Urobili: x   Blood: x / Protein: x / Nitrite: x   Leuk Esterase: x / RBC: x / WBC x   Sq Epi: x / Non Sq Epi: x / Bacteria: x          MICROBIOLOGY:  Vancomycin Level, Trough: 9.9 (06-25 @ 07:23)        Urinalysis with Rflx Culture (collected 24 Jun 2025 14:55)    Lactate, Blood: 1.3 (06-24 @ 14:55)      RADIOLOGY:  imaging below personally reviewed    < from: CT Angio Chest PE Protocol w/ IV Cont (06.25.25 @ 01:20) >    IMPRESSION:  1.  Multifocal segmental and subsegmental acute pulmonary emboli with   examples in the right upper lobe, right lower lobe and left lower lobe as   above.  2.  Cardiomegaly without definite CT evidence of acute right heart strain.    < end of copied text >      < from: CT Abdomen and Pelvis w/ IV Cont (06.24.25 @ 17:41) >  IMPRESSION:    Subcutaneous edema and skin thickening with a focus of air noted   posterior to the sacrum, consistent with known history of sacral ulcer.   No evidence of ostial myelitis.    Questionable filling defect in the right lower lobe segmental and   subsegmental pulmonary artery branches. Findings may reflect a pulmonary   embolus versus inadequate contrast opacification of the distal vessels.   CTA chest may be obtained for further evaluation if there is high   clinical suspicion.    Nonspecific fat stranding in the region of the rectosigmoid colon, which   may represent colitis.    < end of copied text >    < from: Xray Chest 1 View-PORTABLE IMMEDIATE (06.24.25 @ 15:16) >  IMPRESSION:    Small right pleural effusion, again noted      < end of copied text >   INFECTIOUS DISEASE CONSULT NOTE    Patient is a 89y old  Female who presents with a chief complaint of sacral ulcer (25 Jun 2025 10:43)    HPI:  pt is a 89 F with PMHx  of dementia (nonverbal), spinal stenosis, CVA (bedbound), macular degeneration, non healing sacral ulcer presents to ER with daughter for concern for wound infection. Pt is bedbound and non verbal, collateral obtained from daughter states pt has non healing sacral ulcer seen today by wound care nurse p/w wound with malodorous drainage. + fevers in ER   Daughter also states pt with + dysphagia the past 2 days, unable to swallow purees.    family considering hospice   (24 Jun 2025 20:23)      Prior hospital charts reviewed [Yes]  Primary team notes reviewed [Yes]  Other consultant notes reviewed [Yes]    REVIEW OF SYSTEMS:  Unable to provide due to cognitive impairment    PAST MEDICAL & SURGICAL HISTORY:  HTN (hypertension)      CVA (cerebrovascular accident)      Dementia      Chronic atrial fibrillation      History of macular degeneration      Arthritis      Presence of Watchman left atrial appendage closure device      Fracture, tibia and fibula          SOCIAL HISTORY:  Unable to provide due to cognitive impairment      FAMILY HISTORY:  Unable to provide due to cognitive impairment      Allergies:  aspirin (Unknown)  penicillin (Unknown)      ANTIMICROBIALS:  cefepime   IVPB 1000 every 12 hours      ANTIMICROBIALS (past 90 days):  MEDICATIONS  (STANDING):  cefepime   IVPB   100 mL/Hr IV Intermittent (06-24-25 @ 15:16)    vancomycin  IVPB.   250 mL/Hr IV Intermittent (06-24-25 @ 15:16)        OTHER MEDS:   MEDICATIONS  (STANDING):  acetaminophen  Suppository .. 650 every 4 hours PRN  aluminum hydroxide/magnesium hydroxide/simethicone Suspension 30 every 4 hours PRN  aMIOdarone    Tablet 100 daily  citalopram 10 daily  clopidogrel Tablet 75 daily  ondansetron Injectable 4 every 8 hours PRN      VITALS:  Vital Signs Last 24 Hrs  T(F): 97.9 (06-25-25 @ 04:52), Max: 101 (06-24-25 @ 14:32)    Vital Signs Last 24 Hrs  HR: 70 (06-25-25 @ 04:52) (61 - 100)  BP: 108/69 (06-25-25 @ 04:52) (105/64 - 135/69)  RR: 18 (06-25-25 @ 04:52)  SpO2: 97% (06-25-25 @ 04:52) (95% - 97%)  Wt(kg): --    EXAM:  GENERAL: NAD, lying in bed  HEAD: No head lesions  EYES: Conjunctiva pink and cornea white  EAR, NOSE, MOUTH, THROAT: Normal external ears and nose, no discharges; moist mucous membranes  NECK: Supple, nontender to palpation; no JVD  RESPIRATORY: Bibasilar crackles  CARDIOVASCULAR: S1 S2  GASTROINTESTINAL: Soft, nontender, nondistended; normoactive bowel sounds  GENITOURINARY: No goodwin catheter, no CVA tenderness  EXTREMITIES: No clubbing, cyanosis, or petal edema  NERVOUS SYSTEM: Awake and alert, not oriented, nonverbal  MUSCULOSKELETAL: No joint erythema, swelling  SKIN: sacral wound with necrotic tissue and periwound erythema, no superficial thrombophlebitis  PSYCH: Normal affect    Labs:                        11.1   10.69 )-----------( 261      ( 25 Jun 2025 07:23 )             36.4     06-25    143  |  104  |  25[H]  ----------------------------<  63[L]  3.5   |  24  |  1.0    Ca    8.2[L]      25 Jun 2025 07:23  Phos  2.7     06-25  Mg     2.0     06-25    TPro  6.7  /  Alb  2.7[L]  /  TBili  0.7  /  DBili  x   /  AST  19  /  ALT  8   /  AlkPhos  94  06-24      WBC Trend:  WBC Count: 10.69 (06-25-25 @ 07:23)  WBC Count: 10.23 (06-24-25 @ 14:55)      Auto Neutrophil #: 8.32 K/uL (06-24-25 @ 14:55)      Creatine Trend:  Creatinine: 1.0 (06-25)  Creatinine: 1.3 (06-24)  Creatinine: TNP (06-24)      Liver Biochemical Testing Trend:  Alanine Aminotransferase (ALT/SGPT): 8 (06-24)  Alanine Aminotransferase (ALT/SGPT): TNP (06-24)  Alanine Aminotransferase (ALT/SGPT): 10 (05-22)  Aspartate Aminotransferase (AST/SGOT): 19 (06-24-25 @ 15:32)  Aspartate Aminotransferase (AST/SGOT): TNP (06-24-25 @ 14:55)  Aspartate Aminotransferase (AST/SGOT): 25 (05-22-25 @ 20:30)  Bilirubin Total: 0.7 (06-24)  Bilirubin Total: TNP (06-24)  Bilirubin Total: 0.8 (05-22)      Trend LDH      Auto Eosinophil %: 0.4 % (06-24-25 @ 14:55)      Urinalysis Basic - ( 25 Jun 2025 07:23 )    Color: x / Appearance: x / SG: x / pH: x  Gluc: 63 mg/dL / Ketone: x  / Bili: x / Urobili: x   Blood: x / Protein: x / Nitrite: x   Leuk Esterase: x / RBC: x / WBC x   Sq Epi: x / Non Sq Epi: x / Bacteria: x          MICROBIOLOGY:  Vancomycin Level, Trough: 9.9 (06-25 @ 07:23)        Urinalysis with Rflx Culture (collected 24 Jun 2025 14:55)    Lactate, Blood: 1.3 (06-24 @ 14:55)      RADIOLOGY:  imaging below personally reviewed    < from: CT Angio Chest PE Protocol w/ IV Cont (06.25.25 @ 01:20) >    IMPRESSION:  1.  Multifocal segmental and subsegmental acute pulmonary emboli with   examples in the right upper lobe, right lower lobe and left lower lobe as   above.  2.  Cardiomegaly without definite CT evidence of acute right heart strain.    < end of copied text >      < from: CT Abdomen and Pelvis w/ IV Cont (06.24.25 @ 17:41) >  IMPRESSION:    Subcutaneous edema and skin thickening with a focus of air noted   posterior to the sacrum, consistent with known history of sacral ulcer.   No evidence of ostial myelitis.    Questionable filling defect in the right lower lobe segmental and   subsegmental pulmonary artery branches. Findings may reflect a pulmonary   embolus versus inadequate contrast opacification of the distal vessels.   CTA chest may be obtained for further evaluation if there is high   clinical suspicion.    Nonspecific fat stranding in the region of the rectosigmoid colon, which   may represent colitis.    < end of copied text >    < from: Xray Chest 1 View-PORTABLE IMMEDIATE (06.24.25 @ 15:16) >  IMPRESSION:    Small right pleural effusion, again noted      < end of copied text >

## 2025-06-25 NOTE — CONSULT NOTE ADULT - ASSESSMENT
High risk for pressure injury development or progression                              Family considering   cmo with hospice                               Skin assessed- b/l heel intact blanchable  redness                         Moisture associated dermatitis to sacrococcygeal   Wound #1  Type and location : unstageable pressure injury  to sacrococcygeal   Size: 5x4  Tissue Description : Black devitalised skin tissue + odor               No  increased warmth, tenderness, induration, fluctuance, nor crepitus  Wound Exudate : None    Wound Edge:  Intact   Periwound Condition : scar tissue from previously healed ulcer         Wound and skin care recs.   Clean  pressure injury with vashe wound cleanser, pat dry apply Medihoney with moist vashe guaze dressing   Pressure  injury  preventive  measures  Skin  and incontinence care   Assess wound and inform primary provider of any changes   Case discussed with primary Rn  Wound/ ostomy specialist  to f/u as needed     Offloading: [x ] Frequent position changes [ ] Devices/Equipment  Cleansing: [ ] Saline [x ] Soap/Water [ ] Other: ______  Topicals: [x ] Barrier Cream [ ] Antimicrobial [x ]  Wound Debridement [x] Moist  wound Healing   Dressings: [ ] Dry, sterile [ ] Allevyn  Foam [ ] Absorbant Pads [ ] Collagenase    Other Recs.   Per Primary team   Total time for bedside assessment  , review of medical records  and  discussion of plan of care with primary team greater than 35 min

## 2025-06-25 NOTE — HOSPICE CARE NOTE - CONVESATION DETAILS
Hospice referral completed. Phone call to patient's daughter Sophia [phone] (number is wrong in chart). Reviewed hospice care and philosophy. Patient lives at home with 24 hr HHA's. Daughter is receptive to home hospice and is requesting a hospital bed and air mattress. Sophia said that she will work on getting the HHA's reinstated for Monday 6/30/25. Patient may be discharged once HHA's are in place. Hospice admission set for Tuesday 7/1/25.

## 2025-06-25 NOTE — CONSULT NOTE ADULT - NS ATTEND AMEND GEN_ALL_CORE FT
I saw and examined the patient I reviewed images of the wound, chart notes, goals of care, overall poor prognosis of medical conditions.  The patient is not an operative candidate.  I would recommend Santyl or Medihoney for auto debridement.  Thank you

## 2025-06-25 NOTE — CONSULT NOTE ADULT - ASSESSMENT
89 F with PMHx  of dementia (nonverbal), spinal stenosis, CVA (bedbound), macular degeneration, non healing sacral ulcer, A-fib, HTN is consulted for sacral ulcer.  A/P:  - Wound care consult  - IV hydration  - IV abx  - Pain mgmt  #PE  - Medical mgmt  - GI and DVT prophylaxis

## 2025-06-25 NOTE — SWALLOW BEDSIDE ASSESSMENT ADULT - ADDITIONAL RECOMMENDATIONS
GOC conversation warranted to determine long term means of nutrition/hydration vs. comfort care feeds.

## 2025-06-25 NOTE — CONSULT NOTE ADULT - SUBJECTIVE AND OBJECTIVE BOX
HPI: 89F with dementia, nonverbal, spinal stenosis, CVA, bedbound, macular degeneration, non-healing sacral ulcer, here with concern for wound infection, and found to have sepsis from infected sacral wound, on IV vanco and cefepime, and with possible PE on lovenox, awaiting CTA. S&S pending as well for possible dysphagia. Palliative care consulted for GOC. Patient is DNR/DNI. Family possible considering hospice.        PERTINENT PM/SXH:   HTN (hypertension)    CVA (cerebrovascular accident)    Dementia    Chronic atrial fibrillation    History of macular degeneration    Arthritis      Presence of Watchman left atrial appendage closure device    Fracture, tibia and fibula      FAMILY HISTORY:  no pertinent family history      SOCIAL HISTORY:   Significant other/partner[ ]  Children[ ]  Restorationism/Spirituality:  Substance hx:  [ ]   Tobacco hx:  [ ]   Alcohol hx: [ ]   Living Situation: [ ]Home  [ ]Long term care  [ ]Rehab [ ]Other  Home Services: [ ] HHA [ ] Visting RN [ ] Hospice  Occupation:  Home Opioid hx:  [ ] Y [ ] N [ ] I-Stop Reference No: [id number]    ADVANCE DIRECTIVES:    [ ] Full Code [ ] DNR  MOLST  [ ]  Living Will  [ ]   DECISION MAKER(s):  [ ] Health Care Proxy(s)  [ ] Surrogate(s)  [ ] Guardian           Name(s): Phone Number(s):    BASELINE (I)ADL(s) (prior to admission):  [place]: [ ]Total  [ ] Moderate [ ]Dependent  Palliative Performance Status Version 2:         %    http://[facility].org/files/news/palliative_performance_scale_ppsv2.pdf    Allergies    aspirin (Unknown)  penicillin (Unknown)    Intolerances    MEDICATIONS  (STANDING):  aMIOdarone    Tablet 100 milliGRAM(s) Oral daily  cefepime   IVPB 1000 milliGRAM(s) IV Intermittent every 12 hours  chlorhexidine 2% Cloths 1 Application(s) Topical <User Schedule>  citalopram 10 milliGRAM(s) Oral daily  clopidogrel Tablet 75 milliGRAM(s) Oral daily  folic acid 1 milliGRAM(s) Oral at bedtime    MEDICATIONS  (PRN):  acetaminophen  Suppository .. 650 milliGRAM(s) Rectal every 4 hours PRN Temp greater or equal to 38C (100.4F)  aluminum hydroxide/magnesium hydroxide/simethicone Suspension 30 milliLiter(s) Oral every 4 hours PRN Dyspepsia  ondansetron Injectable 4 milliGRAM(s) IV Push every 8 hours PRN Nausea and/or Vomiting      PRESENT SYMPTOMS: [ ]Unable to obtain due to poor mentation   Source if other than patient:  [ ]Family   [ ]Team   [ X]All review of systems negative including pain and dyspnea unless noted below    All components of pain assessment below addressed. Blank spaces indicate that the patient did/could not complete the assessment.  Pain: [ ]yes [ ]no  QOL impact -   Location -                    Aggravating factors -  Quality -  Radiation -  Timing-  Severity (0-10 scale):  Minimal acceptable level (0-10 scale):     CPOT:    https://www.[facility].org/getattachment/[email or web address]/Critical-Care-Pain-Observation-Tool-(CPOT)    PAIN AD Score:   http://geriatrictoolkit.[facility]/cog/painad.pdf (press ctrl +  left click to view)    Dyspnea:                           [ ]None[ ]Mild [ ]Moderate [ ]Severe     Respiratory Distress Observation Scale (RDOS):   A score of 0 to 2 signifies little or no respiratory distress, 3 signifies mild distress, scores 4 to 6 indicate moderate distress, and scores greater than 7 signify severe distress  https://www.[facility].ca/sites/default/files/PDFS/240279-oqrlhswdagg-mzkysxbq-ldngrlwjznu-hqroz.pdf    Anxiety:                             [ ]None[ ]Mild [ ]Moderate [ ]Severe   Fatigue:                             [ ]None[ ]Mild [ ]Moderate [ ]Severe   Nausea:                             [ ]None[ ]Mild [ ]Moderate [ ]Severe   Loss of appetite:              [ ]None[ ]Mild [ ]Moderate [ ]Severe   Constipation:                    [ ]None[ ]Mild [ ]Moderate [ ]Severe    Other Symptoms:    PHYSICAL EXAM:  Vital Signs Last 24 Hrs  T(C): 36.6 (25 Jun 2025 04:52), Max: 38.3 (24 Jun 2025 14:32)  T(F): 97.9 (25 Jun 2025 04:52), Max: 101 (24 Jun 2025 14:32)  HR: 70 (25 Jun 2025 04:52) (61 - 100)  BP: 108/69 (25 Jun 2025 04:52) (105/64 - 135/69)  BP(mean): --  RR: 18 (25 Jun 2025 04:52) (18 - 20)  SpO2: 97% (25 Jun 2025 04:52) (95% - 97%)    Parameters below as of 24 Jun 2025 21:27  Patient On (Oxygen Delivery Method): room air     I&O's Summary      GENERAL:  [X ] No acute distress [ ]Lethargic  [ ]Unarousable  [ ]Verbal  [ ]Non-Verbal [ ]Cachexia    BEHAVIORAL/PSYCH:  [ ]Alert and Oriented x  [ ] Anxiety [ ] Delirium [ ] Agitation [X ] Calm   EYES: [ ] No scleral icterus [ ] Scleral icterus [ ] Closed  ENMT:  [ ]Dry mouth  [ ]No external oral lesions [ X] No external ear or nose lesions  CARDIOVASCULAR:  [ ]Regular [ ]Irregular [ ]Tachy [ ]Not Tachy  [ ]Vaughn [ ] Edema [ ] No edema  PULMONARY:  [ ]Tachypnea  [ ]Audible excessive secretions [X ] No labored breathing [ ] labored breathing  GASTROINTESTINAL: [ ]Soft  [ ]Distended  [ X]Not distended [ ]Non tender [ ]Tender  MUSCULOSKELETAL: [ ]No clubbing [ ] clubbing  [ X] No cyanosis [ ] cyanosis  NEUROLOGIC: [ ]No focal deficits  [ ]Follows commands  [ ]Does not follow commands  [ ]Cognitive impairment  [ ]Dysphagia  [ ]Dysarthria  [ ]Paresis   SKIN: [ ] Jaundiced [X ] Non-jaundiced [ ]Rash [ ]No Rash [ ] Warm [ ] Dry  MISC/LINES: [ ] ET tube [ ] Trach [ ]NGT/OGT [ ]PEG [ ]Rider    CRITICAL CARE:  [ ] Shock Present  [ ]Septic [ ]Cardiogenic [ ]Neurologic [ ]Hypovolemic  [ ]  Vasopressors [ ]  Inotropes   [ ]Respiratory failure present [ ]Mechanical ventilation [ ]Non-invasive ventilatory support [ ]High flow  [ ]Acute  [ ]Chronic [ ]Hypoxic  [ ]Hypercarbic [ ]Other  [ ]Other organ failure     LABS: reviewed by me, notable for: CBC, BMP, UA WNL                        11.1   10.69 )-----------( 261      ( 25 Jun 2025 07:23 )             36.4   06-25    143  |  104  |  25[H]  ----------------------------<  63[L]  3.5   |  24  |  1.0    Ca    8.2[L]      25 Jun 2025 07:23  Phos  2.7     06-25  Mg     2.0     06-25    TPro  6.7  /  Alb  2.7[L]  /  TBili  0.7  /  DBili  x   /  AST  19  /  ALT  8   /  AlkPhos  94  06-24  PT/INR - ( 24 Jun 2025 14:55 )   PT: 13.50 sec;   INR: 1.14 ratio         PTT - ( 24 Jun 2025 14:55 )  PTT:28.1 sec    Urinalysis Basic - ( 25 Jun 2025 07:23 )    Color: x / Appearance: x / SG: x / pH: x  Gluc: 63 mg/dL / Ketone: x  / Bili: x / Urobili: x   Blood: x / Protein: x / Nitrite: x   Leuk Esterase: x / RBC: x / WBC x   Sq Epi: x / Non Sq Epi: x / Bacteria: x      RADIOLOGY & ADDITIONAL STUDIES: CXR personally reviewed by me:  6/24: no opacity    PROTEIN CALORIE MALNUTRITION PRESENT: [ ]mild [ ]moderate [ ]severe [ ]underweight [ ]morbid obesity  https://www.andeal.org/vault/2440/web/files/ONC/Table_Clinical%20Characteristics%20to%20Document%20Malnutrition-White%20JV%20et%20al%202012.pdf    Height (cm): 137.2 (06-24-25 @ 21:27)  Weight (kg): 44 (06-24-25 @ 21:27), 49.9 (05-22-25 @ 18:29)  BMI (kg/m2): 23.4 (06-24-25 @ 21:27)    [ ]PPSV2 < or = to 30% [ ]significant weight loss  [ ]poor nutritional intake  [ ]anasarca      [ ]Artificial Nutrition          Palliative Care Spiritual/Emotional Screening Tool Question  Severity (0-4):                    OR                    [X ] Unable to determine/NA  Score of 2 or greater indicates recommendation of Chaplaincy referral  Chaplaincy Referral: [ ] Yes [ ] Refused [ ] Following     Caregiver [place]:  [ ] Yes [ ] No    OR    [x ] Unable to determine. Will assess at later time if appropriate.  Social Work Referral [ ]  Patient and Family Centered Care Referral [ ]    Anticipatory Grief Present: [ ] Yes [ ] No    OR     [ x] Unable to determine. Will assess at later time if appropriate.  Social Work Referral [ ]  Patient and Family Centered Care Referral [ ]    REFERRALS:   [ ]Chaplaincy  [ ]Hospice  [ ]Child Life  [ ]Social Work  [ ]Case management [ ]Holistic Therapy     Palliative care education provided to patient and/or family    Goals of Care Document:     ______________  Parag Tim MD  Palliative Medicine  [facility]   of Geriatric and Palliative Medicine  [phone]   HPI: 89F with dementia, nonverbal, spinal stenosis, CVA, bedbound, macular degeneration, non-healing sacral ulcer, here with concern for wound infection, and found to have sepsis from infected sacral wound, on IV vanco and cefepime, and with possible PE on lovenox, awaiting CTA. S&S pending as well for possible dysphagia. Palliative care consulted for GOC. Patient is DNR/DNI. Family possible considering hospice.    PERTINENT PM/SXH:   HTN (hypertension)    CVA (cerebrovascular accident)    Dementia    Chronic atrial fibrillation    History of macular degeneration    Arthritis      Presence of Watchman left atrial appendage closure device    Fracture, tibia and fibula      FAMILY HISTORY:  no pertinent family history      SOCIAL HISTORY:   Significant other/partner[ ]  Children[X ]  Voodoo/Spirituality:  Substance hx:  [ ]   Tobacco hx:  [ ]   Alcohol hx: [ ]   Living Situation: [X ]Home  [ ]Long term care  [ ]Rehab [ ]Other  Home Services: [ ] HHA [ ] Visting RN [ ] Hospice  Occupation:  Home Opioid hx:  [ ] Y [ ] N [ ] I-Stop Reference No: [id number]    ADVANCE DIRECTIVES:    [ ] Full Code [X ] DNR  MOLST  [ ]  Living Will  [ ]   DECISION MAKER(s):  [ ] Health Care Proxy(s)  [ ] Surrogate(s)  [ ] Guardian           Name(s): Phone Number(s): racquel Cortes [phone]    BASELINE (I)ADL(s) (prior to admission):  [place]: [ ]Total  [ ] Moderate [ ]Dependent  Palliative Performance Status Version 2:         %    http://npcrc.org/files/news/palliative_performance_scale_ppsv2.pdf    Allergies    aspirin (Unknown)  penicillin (Unknown)    Intolerances    MEDICATIONS  (STANDING):  aMIOdarone    Tablet 100 milliGRAM(s) Oral daily  cefepime   IVPB 1000 milliGRAM(s) IV Intermittent every 12 hours  chlorhexidine 2% Cloths 1 Application(s) Topical <User Schedule>  citalopram 10 milliGRAM(s) Oral daily  clopidogrel Tablet 75 milliGRAM(s) Oral daily  folic acid 1 milliGRAM(s) Oral at bedtime    MEDICATIONS  (PRN):  acetaminophen  Suppository .. 650 milliGRAM(s) Rectal every 4 hours PRN Temp greater or equal to 38C (100.4F)  aluminum hydroxide/magnesium hydroxide/simethicone Suspension 30 milliLiter(s) Oral every 4 hours PRN Dyspepsia  ondansetron Injectable 4 milliGRAM(s) IV Push every 8 hours PRN Nausea and/or Vomiting      PRESENT SYMPTOMS: [X ]Unable to obtain due to poor mentation   Source if other than patient:  [ ]Family   [ ]Team   [ X]All review of systems negative including pain and dyspnea unless noted below    All components of pain assessment below addressed. Blank spaces indicate that the patient did/could not complete the assessment.  Pain: [ ]yes [ ]no  QOL impact -   Location -                    Aggravating factors -  Quality -  Radiation -  Timing-  Severity (0-10 scale):  Minimal acceptable level (0-10 scale):     CPOT:    https://www.[facility].org/getattachment/[email or web address]/Critical-Care-Pain-Observation-Tool-(CPOT)    PAIN AD Score: 0  http://geriatrictoolkit.[facility]/cog/painad.pdf (press ctrl +  left click to view)    Dyspnea:                           [ ]None[ ]Mild [ ]Moderate [ ]Severe     Respiratory Distress Observation Scale (RDOS): 0  A score of 0 to 2 signifies little or no respiratory distress, 3 signifies mild distress, scores 4 to 6 indicate moderate distress, and scores greater than 7 signify severe distress  https://www.[facility].ca/sites/default/files/PDFS/039706-waiczysjkoy-kinctkcp-gokfsnhwpsv-xkgni.pdf    Anxiety:                             [ ]None[ ]Mild [ ]Moderate [ ]Severe   Fatigue:                             [ ]None[ ]Mild [ ]Moderate [ ]Severe   Nausea:                             [ ]None[ ]Mild [ ]Moderate [ ]Severe   Loss of appetite:              [ ]None[ ]Mild [ ]Moderate [ ]Severe   Constipation:                    [ ]None[ ]Mild [ ]Moderate [ ]Severe    Other Symptoms:    PHYSICAL EXAM:  Vital Signs Last 24 Hrs  T(C): 36.6 (25 Jun 2025 04:52), Max: 38.3 (24 Jun 2025 14:32)  T(F): 97.9 (25 Jun 2025 04:52), Max: 101 (24 Jun 2025 14:32)  HR: 70 (25 Jun 2025 04:52) (61 - 100)  BP: 108/69 (25 Jun 2025 04:52) (105/64 - 135/69)  BP(mean): --  RR: 18 (25 Jun 2025 04:52) (18 - 20)  SpO2: 97% (25 Jun 2025 04:52) (95% - 97%)    Parameters below as of 24 Jun 2025 21:27  Patient On (Oxygen Delivery Method): room air     I&O's Summary      GENERAL:  [X ] No acute distress [ ]Lethargic  [ ]Unarousable  [ ]Verbal  [ ]Non-Verbal [ ]Cachexia    BEHAVIORAL/PSYCH:  [ ]Alert and Oriented x  [ ] Anxiety [ ] Delirium [ ] Agitation [X ] Calm   EYES: [ ] No scleral icterus [ ] Scleral icterus [ ] Closed  ENMT:  [ ]Dry mouth  [ ]No external oral lesions [ X] No external ear or nose lesions  CARDIOVASCULAR:  [ ]Regular [ ]Irregular [ ]Tachy [ ]Not Tachy  [ ]Vaughn [ ] Edema [ ] No edema  PULMONARY:  [ ]Tachypnea  [ ]Audible excessive secretions [X ] No labored breathing [ ] labored breathing  GASTROINTESTINAL: [ ]Soft  [ ]Distended  [ X]Not distended [ ]Non tender [ ]Tender  MUSCULOSKELETAL: [ ]No clubbing [ ] clubbing  [ X] No cyanosis [ ] cyanosis  NEUROLOGIC: [ ]No focal deficits  [ ]Follows commands  [ ]Does not follow commands  [ ]Cognitive impairment  [ ]Dysphagia  [ ]Dysarthria  [ ]Paresis   SKIN: [ ] Jaundiced [X ] Non-jaundiced [ ]Rash [ ]No Rash [ ] Warm [ ] Dry  MISC/LINES: [ ] ET tube [ ] Trach [ ]NGT/OGT [ ]PEG [ ]Rider    CRITICAL CARE:  [ ] Shock Present  [ ]Septic [ ]Cardiogenic [ ]Neurologic [ ]Hypovolemic  [ ]  Vasopressors [ ]  Inotropes   [ ]Respiratory failure present [ ]Mechanical ventilation [ ]Non-invasive ventilatory support [ ]High flow  [ ]Acute  [ ]Chronic [ ]Hypoxic  [ ]Hypercarbic [ ]Other  [ ]Other organ failure     LABS: reviewed by me, notable for: CBC, BMP, UA WNL                        11.1   10.69 )-----------( 261      ( 25 Jun 2025 07:23 )             36.4   06-25    143  |  104  |  25[H]  ----------------------------<  63[L]  3.5   |  24  |  1.0    Ca    8.2[L]      25 Jun 2025 07:23  Phos  2.7     06-25  Mg     2.0     06-25    TPro  6.7  /  Alb  2.7[L]  /  TBili  0.7  /  DBili  x   /  AST  19  /  ALT  8   /  AlkPhos  94  06-24  PT/INR - ( 24 Jun 2025 14:55 )   PT: 13.50 sec;   INR: 1.14 ratio         PTT - ( 24 Jun 2025 14:55 )  PTT:28.1 sec    Urinalysis Basic - ( 25 Jun 2025 07:23 )    Color: x / Appearance: x / SG: x / pH: x  Gluc: 63 mg/dL / Ketone: x  / Bili: x / Urobili: x   Blood: x / Protein: x / Nitrite: x   Leuk Esterase: x / RBC: x / WBC x   Sq Epi: x / Non Sq Epi: x / Bacteria: x      RADIOLOGY & ADDITIONAL STUDIES: CXR personally reviewed by me:  6/24: no opacity    PROTEIN CALORIE MALNUTRITION PRESENT: [ ]mild [ ]moderate [ ]severe [ ]underweight [ ]morbid obesity  https://www.andeal.org/vault/2440/web/files/ONC/Table_Clinical%20Characteristics%20to%20Document%20Malnutrition-White%20JV%20et%20al%202012.pdf    Height (cm): 137.2 (06-24-25 @ 21:27)  Weight (kg): 44 (06-24-25 @ 21:27), 49.9 (05-22-25 @ 18:29)  BMI (kg/m2): 23.4 (06-24-25 @ 21:27)    [ ]PPSV2 < or = to 30% [ ]significant weight loss  [ ]poor nutritional intake  [ ]anasarca      [ ]Artificial Nutrition          Palliative Care Spiritual/Emotional Screening Tool Question  Severity (0-4):                    OR                    [X ] Unable to determine/NA  Score of 2 or greater indicates recommendation of Chaplaincy referral  Chaplaincy Referral: [ ] Yes [ ] Refused [ ] Following     Caregiver [place]:  [ ] Yes [ ] No    OR    [x ] Unable to determine. Will assess at later time if appropriate.  Social Work Referral [ ]  Patient and Family Centered Care Referral [ ]    Anticipatory Grief Present: [ ] Yes [ ] No    OR     [ x] Unable to determine. Will assess at later time if appropriate.  Social Work Referral [ ]  Patient and Family Centered Care Referral [ ]    REFERRALS:   [ ]Chaplaincy  [ ]Hospice  [ ]Child Life  [ ]Social Work  [ ]Case management [ ]Holistic Therapy     Palliative care education provided to patient and/or family    Goals of Care Document:     ______________  Parag Tim MD  Palliative Medicine  [facility]   of Geriatric and Palliative Medicine  [phone]

## 2025-06-25 NOTE — CHART NOTE - NSCHARTNOTEFT_GEN_A_CORE
I spoke with daughter Sophia on the phone today and they were agreeable to pursuing hospice and doing CMO here while in the hospital while awaiting hospice. We discussed the severe lethargy of the pt and intolerability of the pt to tolerate po at this time. we do have an IV for ativan and morphine as needed. We discussed that we would continue PO medications if the pt is able to tolerate PO but at this time the pt is not. Daughter is aware of this. Pt will be CMO while awaiting hospice planning

## 2025-06-25 NOTE — CONSULT NOTE ADULT - ASSESSMENT
89 F with PMHx  of dementia (nonverbal), spinal stenosis, CVA (bedbound), macular degeneration, non healing sacral ulcer presents to ER with daughter for concern for wound infection.    ID is consulted for sacral wound  Febrile to 101  WBC Count: 10.69 (06-25-25 @ 07:23)  WBC Count: 10.23 (06-24-25 @ 14:55)  On room air    BCx pending  UA WBC 25, UCx pending    CT Angio Chest PE Protocol w/ IV Cont (06.25.25 @ 01:20)  IMPRESSION:  1.  Multifocal segmental and subsegmental acute pulmonary emboli with   examples in the right upper lobe, right lower lobe and left lower lobe as above.  2.  Cardiomegaly without definite CT evidence of acute right heart strain.    CT Abdomen and Pelvis w/ IV Cont (06.24.25 @ 17:41)  IMPRESSION:  Subcutaneous edema and skin thickening with a focus of air noted   posterior to the sacrum, consistent with known history of sacral ulcer.   No evidence of ostial myelitis.  Questionable filling defect in the right lower lobe segmental and   subsegmental pulmonary artery branches. Findings may reflect a pulmonary   embolus versus inadequate contrast opacification of the distal vessels.   CTA chest may be obtained for further evaluation if there is high   clinical suspicion.  Nonspecific fat stranding in the region of the rectosigmoid colon, which   may represent colitis.    Antibiotics:  Vancomycin 6/24 ->  Cefepime 6/24 ->      IMPRESSION:      RECOMMENDATIONS:        * THIS IS AN INCOMPLETE NOTE. FINAL RECOMMENDATION IS PENDING *    89 F with PMHx  of dementia (nonverbal), spinal stenosis, CVA (bedbound), macular degeneration, non healing sacral ulcer presents to ER with daughter for concern for wound infection.    ID is consulted for sacral wound  Febrile to 101  WBC Count: 10.69 (06-25-25 @ 07:23)  WBC Count: 10.23 (06-24-25 @ 14:55)  On room air    BCx pending  UA WBC 25, UCx pending    CT Angio Chest PE Protocol w/ IV Cont (06.25.25 @ 01:20)  IMPRESSION:  1.  Multifocal segmental and subsegmental acute pulmonary emboli with   examples in the right upper lobe, right lower lobe and left lower lobe as above.  2.  Cardiomegaly without definite CT evidence of acute right heart strain.    CT Abdomen and Pelvis w/ IV Cont (06.24.25 @ 17:41)  IMPRESSION:  Subcutaneous edema and skin thickening with a focus of air noted   posterior to the sacrum, consistent with known history of sacral ulcer.   No evidence of ostial myelitis.  Questionable filling defect in the right lower lobe segmental and   subsegmental pulmonary artery branches. Findings may reflect a pulmonary   embolus versus inadequate contrast opacification of the distal vessels.   CTA chest may be obtained for further evaluation if there is high   clinical suspicion.  Nonspecific fat stranding in the region of the rectosigmoid colon, which   may represent colitis.    Antibiotics:  Vancomycin 6/24 ->  Cefepime 6/24 ->      IMPRESSION:  Infected sacral wound  Dementia  Bedbound    RECOMMENDATIONS:  - D/C abx given CMO  - Palliative care follow up  - Will sign off. Please recall ID PRN for further questions       Deborah Farris D.O.  Attending Physician  Division of Infectious Diseases  [facility]  Please contact me via [employer] Teams

## 2025-06-25 NOTE — CONSULT NOTE ADULT - CONVERSATION DETAILS
Discussed with daughter, who confirms wishes for CMO and hospice. She is aware of hospice, as her aunt was on it in the past, and has a 24/7 HHA they would maintain on hospice care. Discussed comfort feeding as well.

## 2025-06-25 NOTE — CONSULT NOTE ADULT - ASSESSMENT
89F with dementia, nonverbal, spinal stenosis, CVA, bedbound, macular degeneration, non-healing sacral ulcer, here with concern for wound infection, and found to have sepsis from infected sacral wound, on IV vanco and cefepime, and with possible PE on lovenox, awaiting CTA. S&S pending as well for possible dysphagia. Palliative care consulted for GOC. Patient is DNR/DNI. Family possible considering hospice. 89F with dementia, nonverbal, spinal stenosis, CVA, bedbound, macular degeneration, non-healing sacral ulcer, here with concern for wound infection, and found to have sepsis from infected sacral wound, on IV vanco and cefepime, and with possible PE on lovenox, awaiting CTA. S&S pending as well for possible dysphagia. Palliative care consulted for GOC. Patient is DNR/DNI. Family possible considering hospice.    - see medicine note from today, patient is now CMO  - agree with IV ativan, scopolamine patch   - would change morphine IV PRN to dilaudid 0.2mg IV Q2 PRN mod-severe pain or dyspnea  - hospice referral   - will d/w daughter further today  - d/w medical attending re: plan of care  - will follow    ______________  Parag Tim MD  Palliative Medicine  [facility]   of Geriatric and Palliative Medicine  [phone] 89F with dementia, nonverbal, spinal stenosis, CVA, bedbound, macular degeneration, non-healing sacral ulcer, here with concern for wound infection, and found to have sepsis from infected sacral wound, on IV vanco and cefepime, and with possible PE on lovenox, awaiting CTA. S&S pending as well for possible dysphagia. Palliative care consulted for GOC. Patient is DNR/DNI. Family possible considering hospice.    - see medicine note from today, patient is now CMO  - agree with IV ativan, scopolamine patch   - would change morphine IV PRN to dilaudid 0.2mg IV Q2 PRN mod-severe pain or dyspnea  - hospice referral   - see GOC above  - d/w medical attending re: plan of care  - will follow    ______________  Parag Tim MD  Palliative Medicine  [facility]   of Geriatric and Palliative Medicine  [phone]

## 2025-06-25 NOTE — CONSULT NOTE ADULT - SUBJECTIVE AND OBJECTIVE BOX
pt is a 89 F with PMHx  of dementia (nonverbal), spinal stenosis, CVA (bedbound), macular degeneration, non healing sacral ulcer presents to ER with daughter for concern for wound infection. Pt is bedbound and non verbal, collateral obtained from daughter states pt has non healing sacral ulcer seen today by wound care nurse p/w wound with malodorous drainage. + fevers in ER   Daughter also states pt with + dysphagia the past 2 days, unable to swallow purees.  family considering hospice    PAST MEDICAL & SURGICAL HISTORY:  HTN (hypertension)  CVA (cerebrovascular accident)  Dementia  Chronic atrial fibrillation  History of macular degeneration  Arthritis  Presence of Watchman left atrial appendage closure device  Fracture, tibia and fibula      Vital Signs Last 24 Hrs  T(C): 36.6 (25 Jun 2025 04:52), Max: 38.3 (24 Jun 2025 14:32)  T(F): 97.9 (25 Jun 2025 04:52), Max: 101 (24 Jun 2025 14:32)  HR: 70 (25 Jun 2025 04:52) (61 - 100)  BP: 108/69 (25 Jun 2025 04:52) (105/64 - 135/69)  BP(mean): --  RR: 18 (25 Jun 2025 04:52) (18 - 20)  SpO2: 97% (25 Jun 2025 04:52) (95% - 97%)    Parameters below as of 24 Jun 2025 21:27  Patient On (Oxygen Delivery Method): room air      PHYSICAL EXAM:      Constitutional: NAD, A&O x3    Eyes: PERRLA, no conjuctivitis    Neck: no lymphadenopathy    Respiratory: +air entry, no rales, no rhonchi, no wheezes    Cardiovascular: +S1 and S2, regular rate and rhythm    Gastrointestinal: +BS, soft, non-tender, not distended    Extremities:  no edema, no calf tenderness    Vascular: +dorsal pedis and radial pulses, no extremity cyanosis    Neurological: sensation intact, ROM equal B/L, CN II-XII intact    Skin: no rashes, normal turgor                            11.3   10.23 )-----------( 290      ( 24 Jun 2025 14:55 )             36.5     06-24    142  |  102  |  31[H]  ----------------------------<  85  4.5   |  20  |  1.3    Ca    8.4      24 Jun 2025 15:32    TPro  6.7  /  Alb  2.7[L]  /  TBili  0.7  /  DBili  x   /  AST  19  /  ALT  8   /  AlkPhos  94  06-24          Urinalysis Basic - ( 24 Jun 2025 15:32 )    Color: x / Appearance: x / SG: x / pH: x  Gluc: 85 mg/dL / Ketone: x  / Bili: x / Urobili: x   Blood: x / Protein: x / Nitrite: x   Leuk Esterase: x / RBC: x / WBC x   Sq Epi: x / Non Sq Epi: x / Bacteria: x      PT/INR - ( 24 Jun 2025 14:55 )   PT: 13.50 sec;   INR: 1.14 ratio         PTT - ( 24 Jun 2025 14:55 )  PTT:28.1 sec      CAPILLARY BLOOD GLUCOSE      POCT Blood Glucose.: 98 mg/dL (24 Jun 2025 21:19)      Urinalysis with Rflx Culture (collected 06-24-25 @ 14:55)          < from: CT Angio Chest PE Protocol w/ IV Cont (06.25.25 @ 01:20) >  IMPRESSION:  1.  Multifocal segmental and subsegmental acute pulmonary emboli with   examples in the right upper lobe, right lower lobe and left lower lobe as   above.  2.  Cardiomegaly without definite CT evidence of acute right heart strain.    Secure message sent to covering provider attending hospitalist Cas Llamas on TEAMS on 6/25/25 at 4:47am.    < end of copied text >       pt is a 89 F with PMHx  of dementia (nonverbal), spinal stenosis, CVA (bedbound), macular degeneration, non healing sacral ulcer presents to ER with daughter for concern for wound infection. Pt is bedbound and non verbal, collateral obtained from daughter states pt has non healing sacral ulcer seen today by wound care nurse p/w wound with malodorous drainage. + fevers in ER   Daughter also states pt with + dysphagia the past 2 days, unable to swallow purees.  family considering hospice    PAST MEDICAL & SURGICAL HISTORY:  HTN (hypertension)  CVA (cerebrovascular accident)  Dementia  Chronic atrial fibrillation  History of macular degeneration  Arthritis  Presence of Watchman left atrial appendage closure device  Fracture, tibia and fibula      Vital Signs Last 24 Hrs  T(C): 36.6 (25 Jun 2025 04:52), Max: 38.3 (24 Jun 2025 14:32)  T(F): 97.9 (25 Jun 2025 04:52), Max: 101 (24 Jun 2025 14:32)  HR: 70 (25 Jun 2025 04:52) (61 - 100)  BP: 108/69 (25 Jun 2025 04:52) (105/64 - 135/69)  BP(mean): --  RR: 18 (25 Jun 2025 04:52) (18 - 20)  SpO2: 97% (25 Jun 2025 04:52) (95% - 97%)    Parameters below as of 24 Jun 2025 21:27  Patient On (Oxygen Delivery Method): room air      PHYSICAL EXAM:      Constitutional: NAD, A&O x3    Eyes: PERRLA, no conjuctivitis    Neck: no lymphadenopathy    Respiratory: +air entry, no rales, no rhonchi, no wheezes    Cardiovascular: +S1 and S2, regular rate and rhythm    Gastrointestinal: +BS, soft, non-tender, not distended    Extremities:  no edema, no calf tenderness    Vascular: +dorsal pedis and radial pulses, no extremity cyanosis    Neurological: sensation intact, ROM equal B/L, CN II-XII intact    Skin: no rashes, normal turgor                            11.3   10.23 )-----------( 290      ( 24 Jun 2025 14:55 )             36.5     06-24    142  |  102  |  31[H]  ----------------------------<  85  4.5   |  20  |  1.3    Ca    8.4      24 Jun 2025 15:32    TPro  6.7  /  Alb  2.7[L]  /  TBili  0.7  /  DBili  x   /  AST  19  /  ALT  8   /  AlkPhos  94  06-24          Urinalysis Basic - ( 24 Jun 2025 15:32 )    Color: x / Appearance: x / SG: x / pH: x  Gluc: 85 mg/dL / Ketone: x  / Bili: x / Urobili: x   Blood: x / Protein: x / Nitrite: x   Leuk Esterase: x / RBC: x / WBC x   Sq Epi: x / Non Sq Epi: x / Bacteria: x      PT/INR - ( 24 Jun 2025 14:55 )   PT: 13.50 sec;   INR: 1.14 ratio         PTT - ( 24 Jun 2025 14:55 )  PTT:28.1 sec      CAPILLARY BLOOD GLUCOSE      POCT Blood Glucose.: 98 mg/dL (24 Jun 2025 21:19)      Urinalysis with Rflx Culture (collected 06-24-25 @ 14:55)          < from: CT Angio Chest PE Protocol w/ IV Cont (06.25.25 @ 01:20) >  IMPRESSION:  1.  Multifocal segmental and subsegmental acute pulmonary emboli with   examples in the right upper lobe, right lower lobe and left lower lobe as   above.  2.  Cardiomegaly without definite CT evidence of acute right heart strain.    Secure message sent to covering provider attending hospitalist Cas Llamas on TEAMS on 6/25/25 at 4:47am.    < end of copied text >        < from: CT Abdomen and Pelvis w/ IV Cont (06.24.25 @ 17:41) >  IMPRESSION:    Subcutaneous edema and skin thickening with a focus of air noted   posterior to the sacrum, consistent with known history of sacral ulcer.   No evidence of ostial myelitis.    Questionable filling defect in the right lower lobe segmental and   subsegmental pulmonary artery branches. Findings may reflect a pulmonary   embolus versus inadequate contrast opacification of the distal vessels.   CTA chest may be obtained for further evaluation if there is high   clinical suspicion.    < end of copied text >     pt is a 89 F with PMHx  of dementia (nonverbal), spinal stenosis, CVA (bedbound), macular degeneration, non healing sacral ulcer presents to ER with daughter for concern for wound infection. Pt is bedbound and non verbal, collateral obtained from daughter states pt has non healing sacral ulcer seen today by wound care nurse p/w wound with malodorous drainage. + fevers in ER   Daughter also states pt with + dysphagia the past 2 days, unable to swallow purees.  family considering hospice    PAST MEDICAL & SURGICAL HISTORY:  HTN (hypertension)  CVA (cerebrovascular accident)  Dementia  Chronic atrial fibrillation  History of macular degeneration  Arthritis  Presence of Watchman left atrial appendage closure device  Fracture, tibia and fibula      Vital Signs Last 24 Hrs  T(C): 36.6 (25 Jun 2025 04:52), Max: 38.3 (24 Jun 2025 14:32)  T(F): 97.9 (25 Jun 2025 04:52), Max: 101 (24 Jun 2025 14:32)  HR: 70 (25 Jun 2025 04:52) (61 - 100)  BP: 108/69 (25 Jun 2025 04:52) (105/64 - 135/69)  BP(mean): --  RR: 18 (25 Jun 2025 04:52) (18 - 20)  SpO2: 97% (25 Jun 2025 04:52) (95% - 97%)    Parameters below as of 24 Jun 2025 21:27  Patient On (Oxygen Delivery Method): room air      PHYSICAL EXAM:      Constitutional: NAD    Eyes: PERRLA, no conjuctivitis    Neck: no lymphadenopathy    Respiratory: +air entry, no rales, no rhonchi, no wheezes    Cardiovascular: +S1 and S2,     Gastrointestinal: +BS, soft, non-tender, not distended    Extremities:  no edema, no calf tenderness    Vascular: +dorsal pedis and radial pulses, no extremity cyanosis    Neurological: sensation intact,     Skin: no rashes, normal turgor. Stage IV/unstageable sacral ulcer w/ tunneling                             11.3   10.23 )-----------( 290      ( 24 Jun 2025 14:55 )             36.5     06-24    142  |  102  |  31[H]  ----------------------------<  85  4.5   |  20  |  1.3    Ca    8.4      24 Jun 2025 15:32    TPro  6.7  /  Alb  2.7[L]  /  TBili  0.7  /  DBili  x   /  AST  19  /  ALT  8   /  AlkPhos  94  06-24          Urinalysis Basic - ( 24 Jun 2025 15:32 )    Color: x / Appearance: x / SG: x / pH: x  Gluc: 85 mg/dL / Ketone: x  / Bili: x / Urobili: x   Blood: x / Protein: x / Nitrite: x   Leuk Esterase: x / RBC: x / WBC x   Sq Epi: x / Non Sq Epi: x / Bacteria: x      PT/INR - ( 24 Jun 2025 14:55 )   PT: 13.50 sec;   INR: 1.14 ratio         PTT - ( 24 Jun 2025 14:55 )  PTT:28.1 sec      CAPILLARY BLOOD GLUCOSE      POCT Blood Glucose.: 98 mg/dL (24 Jun 2025 21:19)      Urinalysis with Rflx Culture (collected 06-24-25 @ 14:55)          < from: CT Angio Chest PE Protocol w/ IV Cont (06.25.25 @ 01:20) >  IMPRESSION:  1.  Multifocal segmental and subsegmental acute pulmonary emboli with   examples in the right upper lobe, right lower lobe and left lower lobe as   above.  2.  Cardiomegaly without definite CT evidence of acute right heart strain.    Secure message sent to covering provider attending hospitalist Cas Llamas on TEAMS on 6/25/25 at 4:47am.    < end of copied text >        < from: CT Abdomen and Pelvis w/ IV Cont (06.24.25 @ 17:41) >  IMPRESSION:    Subcutaneous edema and skin thickening with a focus of air noted   posterior to the sacrum, consistent with known history of sacral ulcer.   No evidence of ostial myelitis.    Questionable filling defect in the right lower lobe segmental and   subsegmental pulmonary artery branches. Findings may reflect a pulmonary   embolus versus inadequate contrast opacification of the distal vessels.   CTA chest may be obtained for further evaluation if there is high   clinical suspicion.    < end of copied text >

## 2025-06-26 VITALS — WEIGHT: 110.01 LBS

## 2025-06-26 PROCEDURE — 99231 SBSQ HOSP IP/OBS SF/LOW 25: CPT

## 2025-06-26 PROCEDURE — 99233 SBSQ HOSP IP/OBS HIGH 50: CPT

## 2025-06-26 RX ADMIN — Medication 2 MILLIGRAM(S): at 13:46

## 2025-06-26 RX ADMIN — Medication 1 APPLICATION(S): at 06:16

## 2025-06-26 RX ADMIN — Medication 2 MILLIGRAM(S): at 14:01

## 2025-06-26 RX ADMIN — Medication 650 MILLIGRAM(S): at 21:04

## 2025-06-26 RX ADMIN — SCOPOLAMINE 1 PATCH: 1 PATCH, EXTENDED RELEASE TRANSDERMAL at 19:23

## 2025-06-26 RX ADMIN — SCOPOLAMINE 1 PATCH: 1 PATCH, EXTENDED RELEASE TRANSDERMAL at 06:16

## 2025-06-26 NOTE — PROGRESS NOTE ADULT - ASSESSMENT
89 F with PMHx  of dementia (nonverbal), spinal stenosis, CVA (bedbound), macular degeneration, non healing sacral ulcer presents to ER with daughter for concern for wound infection.    #end of life care  -made CMO and awaiting hospice (hospice nurse to come next tuesday and pt will be able to be DCd on Monday before)    # sepsis   # infected sacral wound  - admit to medicine    - IVF,  IV abx : vanc / Cefepime -> stopped abx on 6/25 for CMO  - surgery consult - no surgical intervention  - wound care consult   - f/u labs, cultures     # r/o PE   -  CT - Questionable filling defect in the right lower lobe segmental and   subsegmental pulmonary artery branches.  -CTA positive for subsegmental PE, will not do AC and spoke with daughter regarding this, pt is CMO    # malnutrition   # dysphagia   - speech and swallow consult   - CMO    #dvt ppx - lovenox   # gi ppx   # code status - DNR/DNI, spoke with Dr. Tim from palliative and he is aware  CMO/Hospice     ---PB Handoff Note---    Pending/Follow up items (tests, labs, procedures,consults):    Indication for continued inpatient management:    Goals of Care note:     Family contact:  Dispo (home, SNF, etc):    Prepare for DC order [x] - updated FOL is:   DC provider note prep:    -------------------------------Time spent-----------------------------------------------------------------------  Initial visit:             mins [ ] -- 55-74 mins [ ]  Subsequent visit: 50-79 mins[ ]    -- 35-49mins [ ]     --------------------------------# and complexity of problems---------------------------------------------  [x] chronic illness with severe exacerbation , progression, treatment side effect  [ ] acute or chronic illness with threat to life or bodily funtion                         --------------------------------Complexity of data reviewed ----------------------------------------------  The Patients complexity of data reviewed  is Low [ ] Moderate [x ] High [ ] due to the following:   A:      Reviewed prior external records [ ]      Considering/ Ordered a unique test:  Labs [x ] Imaging [x ] Stress Test  [ ] Other: Specify [ ]      Reviewed each unique test result [x ]      Assessment requiring an independent historian [ ]  B:       Independent interpretation of:   X-Ray [ ] EKG [ ]  Other: Specify  [ ]  C:       Discussion of management of tests with clinician outside of my group [ ]     -------------------------------------Risk of Morbidity-------------------------------------------------------  The Patients risk of morbidity is Low [ ] Moderate [x ] High [ ] due to the following:   Mod:  Prescription Drug Management [x ]  Decision regarding elective or emergent: minor surgery [ ] major surgery [ ]  Decision regarding hospitalization or escalation of hospital-level care [ ]  SDOH: Hearing/Vision impaired [ ] Food insecurity [ ] Homelessness/unsafe condition [ ]   Financial strain [ ] un/underemployed [ ] Lack of Transport [ ]  High:  DNR or De-Escalation of care because of poor prognosis [ ]  Drug Therapy requiring intensive monitoring for toxicity [ ]  Parenteral controlled substance [ ]  ------------------------------------------------------------------------------------------------------------------ 89 F with PMHx  of dementia (nonverbal), spinal stenosis, CVA (bedbound), macular degeneration, non healing sacral ulcer presents to ER with daughter for concern for wound infection.    #end of life care  -made CMO and awaiting hospice (hospice nurse to come next tuesday and pt will be able to be DCd on Monday before)    # sepsis   # infected sacral wound  - admit to medicine    - IVF,  IV abx : vanc / Cefepime -> stopped abx on 6/25 for CMO  - surgery consult - no surgical intervention  - wound care consult   - f/u labs, cultures     # r/o PE   -  CT - Questionable filling defect in the right lower lobe segmental and   subsegmental pulmonary artery branches.  -CTA positive for subsegmental PE, will not do AC and spoke with daughter regarding this, pt is CMO    # malnutrition   # dysphagia   - speech and swallow consult   - CMO    #dvt ppx - lovenox   # gi ppx   # code status - DNR/DNI, spoke with Dr. Tim from palliative and he is aware  CMO/Hospice     ---PB Handoff Note---    Pending/Follow up items (tests, labs, procedures,consults):    Indication for continued inpatient management:    Goals of Care note:     Family contact:  Dispo (home, SNF, etc):    Prepare for DC order [x] - updated FLO is:   DC provider note prep:    -------------------------------Time spent-----------------------------------------------------------------------  Initial visit:             mins [ ] -- 55-74 mins [ ]  Subsequent visit: 50-79 mins[ ]    -- 35-49mins [ ]     --------------------------------# and complexity of problems---------------------------------------------  [x] chronic illness with severe exacerbation , progression, treatment side effect  [ ] acute or chronic illness with threat to life or bodily funtion                         --------------------------------Complexity of data reviewed ----------------------------------------------  The Patients complexity of data reviewed  is Low [ x] Moderate [ ] High [ ] due to the following:   A:      Reviewed prior external records [ ]      Considering/ Ordered a unique test:  Labs [ ] Imaging [ ] Stress Test  [ ] Other: Specify [ ]      Reviewed each unique test result [ ]      Assessment requiring an independent historian [ ]  B:       Independent interpretation of:   X-Ray [ ] EKG [ ]  Other: Specify  [ ]  C:       Discussion of management of tests with clinician outside of my group [ ]     -------------------------------------Risk of Morbidity-------------------------------------------------------  The Patients risk of morbidity is Low [ ] Moderate [x ] High [ ] due to the following:   Mod:  Prescription Drug Management [x ]  Decision regarding elective or emergent: minor surgery [ ] major surgery [ ]  Decision regarding hospitalization or escalation of hospital-level care [ ]  SDOH: Hearing/Vision impaired [ ] Food insecurity [ ] Homelessness/unsafe condition [ ]   Financial strain [ ] un/underemployed [ ] Lack of Transport [ ]  High:  DNR or De-Escalation of care because of poor prognosis [ ]  Drug Therapy requiring intensive monitoring for toxicity [ ]  Parenteral controlled substance [ ]  ------------------------------------------------------------------------------------------------------------------

## 2025-06-26 NOTE — PROGRESS NOTE ADULT - ASSESSMENT
89F with dementia, nonverbal, spinal stenosis, CVA, bedbound, macular degeneration, non-healing sacral ulcer, here with concern for wound infection, and found to have sepsis from infected sacral wound, on IV vanco and cefepime, and with possible PE on lovenox, awaiting CTA. S&S pending as well for possible dysphagia. Palliative care consulted for GOC. Patient is DNR/DNI. Family possible considering hospice.    - would change morphine IV PRN to dilaudid 0.2mg IV Q2 PRN mod-severe pain or dyspnea  - c/w IV diazepam, scopolamine patch  - can change diet to pureed for comfort feeds if patient desires (d/w family 6/25)  - hospice planned for next Tuesday 7/1/25  - see GOC 6/25/25  - will follow    ______________  Parag Tim MD  Palliative Medicine  [facility]   of Geriatric and Palliative Medicine  [phone]

## 2025-06-26 NOTE — DIETITIAN INITIAL EVALUATION ADULT - ORAL INTAKE PTA/DIET HISTORY
unable to assess pt non verbal, lethargic. no family at bedside. As per EMR review pt weighed 49.9 kgs on May 22nd vs 44 kgs today indicating an unintentional weight loss of 13# in one month    presently NPO as per SLP eval

## 2025-06-26 NOTE — DIETITIAN INITIAL EVALUATION ADULT - PERTINENT LABORATORY DATA
06-25    143  |  104  |  25[H]  ----------------------------<  63[L]  3.5   |  24  |  1.0    Ca    8.2[L]      25 Jun 2025 07:23  Phos  2.7     06-25  Mg     2.0     06-25

## 2025-06-26 NOTE — DIETITIAN INITIAL EVALUATION ADULT - PERTINENT MEDS FT
MEDICATIONS  (STANDING):  chlorhexidine 2% Cloths 1 Application(s) Topical <User Schedule>  scopolamine 1 mG/72 Hr(s) Patch 1 Patch Transdermal every 72 hours    MEDICATIONS  (PRN):  acetaminophen  Suppository .. 650 milliGRAM(s) Rectal every 4 hours PRN Temp greater or equal to 38C (100.4F)  diazepam  Injectable 2.5 milliGRAM(s) IV Push every 4 hours PRN anxiety  morphine  - Injectable 2 milliGRAM(s) IV Push every 2 hours PRN Moderate pain (4-6), Severe pain (7-10), Respiratory rate greater than 22  ondansetron Injectable 4 milliGRAM(s) IV Push every 8 hours PRN Nausea and/or Vomiting

## 2025-06-26 NOTE — DIETITIAN INITIAL EVALUATION ADULT - OTHER INFO
pt is 89 year old female with hx of dementia, non verbal, spinal stenosis, CVA, bedbound, macular degeneration, non healing sacral ulcer p/w concern for wound infection. pt admitted with sepsis, infected sacral ulcer, r/o PE and malnutrition. pt presently on CMO, hospice planned for 7/1/25

## 2025-06-27 PROCEDURE — 99231 SBSQ HOSP IP/OBS SF/LOW 25: CPT

## 2025-06-27 PROCEDURE — 99233 SBSQ HOSP IP/OBS HIGH 50: CPT

## 2025-06-27 RX ORDER — DIPHENHYDRAMINE HCL 12.5MG/5ML
25 ELIXIR ORAL ONCE
Refills: 0 | Status: COMPLETED | OUTPATIENT
Start: 2025-06-27 | End: 2025-06-27

## 2025-06-27 RX ORDER — HYDROMORPHONE/SOD CHLOR,ISO/PF 2 MG/10 ML
0.2 SYRINGE (ML) INJECTION
Refills: 0 | Status: DISCONTINUED | OUTPATIENT
Start: 2025-06-27 | End: 2025-07-01

## 2025-06-27 RX ORDER — DIPHENHYDRAMINE HCL 12.5MG/5ML
25 ELIXIR ORAL ONCE
Refills: 0 | Status: DISCONTINUED | OUTPATIENT
Start: 2025-06-27 | End: 2025-06-27

## 2025-06-27 RX ADMIN — Medication 1 APPLICATION(S): at 06:02

## 2025-06-27 RX ADMIN — Medication 25 MILLIGRAM(S): at 20:58

## 2025-06-27 RX ADMIN — SCOPOLAMINE 1 PATCH: 1 PATCH, EXTENDED RELEASE TRANSDERMAL at 07:52

## 2025-06-27 RX ADMIN — SCOPOLAMINE 1 PATCH: 1 PATCH, EXTENDED RELEASE TRANSDERMAL at 19:38

## 2025-06-27 NOTE — PROGRESS NOTE ADULT - ASSESSMENT
89F with dementia, nonverbal, spinal stenosis, CVA, bedbound, macular degeneration, non-healing sacral ulcer, here with concern for wound infection, and found to have sepsis from infected sacral wound, on IV vanco and cefepime, and with possible PE on lovenox, awaiting CTA. S&S pending as well for possible dysphagia. Palliative care consulted for GOC. Patient is DNR/DNI. Family possible considering hospice.    - ongoing syptoms management in setting of CMO; would change morphine IV PRN to dilaudid 0.2mg IV Q2 PRN mod-severe pain or dyspnea  - c/w IV diazepam, scopolamine patch  - can change diet to pureed for comfort feeds if patient desires (d/w family 6/25)  - hospice planned for next Tuesday 7/1/25  - see GOC 6/25/25  - will follow    ______________  Parag Tim MD  Palliative Medicine  [facility]   of Geriatric and Palliative Medicine  [phone]

## 2025-06-27 NOTE — PROGRESS NOTE ADULT - ASSESSMENT
89 F with PMHx  of dementia (nonverbal), spinal stenosis, CVA (bedbound), macular degeneration, non healing sacral ulcer presents to ER with daughter for concern for wound infection.    #end of life care  -made CMO and awaiting hospice (hospice nurse to come next tuesday and pt will be able to be DCd on Monday before)    # sepsis   # infected sacral wound  - admit to medicine    - IVF,  IV abx : vanc / Cefepime -> stopped abx on 6/25 for CMO  - surgery consult - no surgical intervention  - wound care consult   - urine culture with e coli but holding abx as CMO and having dysphagia     # r/o PE   -  CT - Questionable filling defect in the right lower lobe segmental and   subsegmental pulmonary artery branches.  -CTA positive for subsegmental PE, will not do AC and spoke with daughter regarding this, pt is CMO    # malnutrition   # dysphagia   - speech and swallow consult   - CMO    #dvt ppx - lovenox   # gi ppx   # code status - DNR/DNI, spoke with Dr. Tim from palliative and he is aware  CMO/Hospice     ---PB Handoff Note---    Pending/Follow up items (tests, labs, procedures,consults):    Indication for continued inpatient management:    Goals of Care note:     Family contact:  Dispo (home, SNF, etc):    Prepare for DC order [x] - updated FLO is:   DC provider note prep:    -------------------------------Time spent-----------------------------------------------------------------------  Initial visit:             mins [ ] -- 55-74 mins [ ]  Subsequent visit: 50-79 mins[ ]    -- 35-49mins [ ]     --------------------------------# and complexity of problems---------------------------------------------  [x] chronic illness with severe exacerbation , progression, treatment side effect  [ ] acute or chronic illness with threat to life or bodily funtion                         --------------------------------Complexity of data reviewed ----------------------------------------------  The Patients complexity of data reviewed  is Low [ x] Moderate [ ] High [ ] due to the following:   A:      Reviewed prior external records [ ]      Considering/ Ordered a unique test:  Labs [ ] Imaging [ ] Stress Test  [ ] Other: Specify [ ]      Reviewed each unique test result [ ]      Assessment requiring an independent historian [ ]  B:       Independent interpretation of:   X-Ray [ ] EKG [ ]  Other: Specify  [ ]  C:       Discussion of management of tests with clinician outside of my group [ ]     -------------------------------------Risk of Morbidity-------------------------------------------------------  The Patients risk of morbidity is Low [ ] Moderate [x ] High [ ] due to the following:   Mod:  Prescription Drug Management [x ]  Decision regarding elective or emergent: minor surgery [ ] major surgery [ ]  Decision regarding hospitalization or escalation of hospital-level care [ ]  SDOH: Hearing/Vision impaired [ ] Food insecurity [ ] Homelessness/unsafe condition [ ]   Financial strain [ ] un/underemployed [ ] Lack of Transport [ ]  High:  DNR or De-Escalation of care because of poor prognosis [ ]  Drug Therapy requiring intensive monitoring for toxicity [ ]  Parenteral controlled substance [ ]  ------------------------------------------------------------------------------------------------------------------

## 2025-06-27 NOTE — HOSPICE CARE NOTE - CONVESATION DETAILS
Follow up phone call with patient's daughter Sophia. HHA's are being reinstated on Monday 6/30/25. Please discharge patient on Monday. DME being delivered this afternoon. Hospice admission set for Tuesday 7/1/25.

## 2025-06-27 NOTE — CHART NOTE - NSCHARTNOTEFT_GEN_A_CORE
This was a telehealth visit with Dr. Tim Pt is CMO, non-verbal and looked comfortable. I was a silent pastoral presence. I will follow up for support.

## 2025-06-28 LAB
-  AMPICILLIN/SULBACTAM: SIGNIFICANT CHANGE UP
-  AMPICILLIN: SIGNIFICANT CHANGE UP
-  AZTREONAM: SIGNIFICANT CHANGE UP
-  CEFAZOLIN: SIGNIFICANT CHANGE UP
-  CEFEPIME: SIGNIFICANT CHANGE UP
-  CEFTRIAXONE: SIGNIFICANT CHANGE UP
-  CEFUROXIME: SIGNIFICANT CHANGE UP
-  CIPROFLOXACIN: SIGNIFICANT CHANGE UP
-  ERTAPENEM: SIGNIFICANT CHANGE UP
-  GENTAMICIN: SIGNIFICANT CHANGE UP
-  IMIPENEM: SIGNIFICANT CHANGE UP
-  LEVOFLOXACIN: SIGNIFICANT CHANGE UP
-  MEROPENEM: SIGNIFICANT CHANGE UP
-  NITROFURANTOIN: SIGNIFICANT CHANGE UP
-  PIPERACILLIN/TAZOBACTAM: SIGNIFICANT CHANGE UP
-  TIGECYCLINE: SIGNIFICANT CHANGE UP
-  TOBRAMYCIN: SIGNIFICANT CHANGE UP
-  TRIMETHOPRIM/SULFAMETHOXAZOLE: SIGNIFICANT CHANGE UP
CULTURE RESULTS: ABNORMAL
METHOD TYPE: SIGNIFICANT CHANGE UP
ORGANISM # SPEC MICROSCOPIC CNT: ABNORMAL
ORGANISM # SPEC MICROSCOPIC CNT: SIGNIFICANT CHANGE UP
SPECIMEN SOURCE: SIGNIFICANT CHANGE UP

## 2025-06-28 PROCEDURE — 99232 SBSQ HOSP IP/OBS MODERATE 35: CPT

## 2025-06-28 RX ADMIN — Medication 1 APPLICATION(S): at 05:01

## 2025-06-28 RX ADMIN — SCOPOLAMINE 1 PATCH: 1 PATCH, EXTENDED RELEASE TRANSDERMAL at 21:06

## 2025-06-28 RX ADMIN — SCOPOLAMINE 1 PATCH: 1 PATCH, EXTENDED RELEASE TRANSDERMAL at 11:04

## 2025-06-28 RX ADMIN — Medication 0.2 MILLIGRAM(S): at 04:24

## 2025-06-28 RX ADMIN — SCOPOLAMINE 1 PATCH: 1 PATCH, EXTENDED RELEASE TRANSDERMAL at 07:40

## 2025-06-28 RX ADMIN — SCOPOLAMINE 1 PATCH: 1 PATCH, EXTENDED RELEASE TRANSDERMAL at 11:02

## 2025-06-28 RX ADMIN — Medication 0.2 MILLIGRAM(S): at 03:17

## 2025-06-28 NOTE — PROGRESS NOTE ADULT - ASSESSMENT
89 F with PMHx  of dementia (nonverbal), spinal stenosis, CVA (bedbound), macular degeneration, non healing sacral ulcer presents to ER with daughter for concern for wound infection.    #end of life care  -made CMO and awaiting hospice (hospice nurse to come next tuesday and pt will be able to be DCd on Monday before)    # sepsis   # infected sacral wound  - admit to medicine    - IVF,  IV abx : vanc / Cefepime -> stopped abx on 6/25 for CMO  - surgery consult - no surgical intervention  - wound care consult   - urine culture with e coli ESBL (result returned 6/28) but holding abx as CMO and having dysphagia     # r/o PE   -  CT - Questionable filling defect in the right lower lobe segmental and   subsegmental pulmonary artery branches.  -CTA positive for subsegmental PE, will not do AC and spoke with daughter regarding this, pt is CMO    # malnutrition   # dysphagia   - speech and swallow consult   - CMO    #dvt ppx - lovenox   # gi ppx   # code status - DNR/DNI, spoke with Dr. Tim from palliative and he is aware  CMO/Hospice     ---PB Handoff Note---    Pending/Follow up items (tests, labs, procedures,consults): pending DC to home on Monday 6/30    Indication for continued inpatient management:    Goals of Care note:     Family contact:  Dispo (home, SNF, etc):    Prepare for DC order [x] - updated FLO is:   DC provider note prep:    -------------------------------Time spent-----------------------------------------------------------------------  Initial visit:             mins [ ] -- 55-74 mins [ ]  Subsequent visit: 50-79 mins[ ]    -- 35-49mins [ ]     --------------------------------# and complexity of problems---------------------------------------------  [x] chronic illness with severe exacerbation , progression, treatment side effect  [ ] acute or chronic illness with threat to life or bodily funtion                         --------------------------------Complexity of data reviewed ----------------------------------------------  The Patients complexity of data reviewed  is Low [ ] Moderate [x ] High [ ] due to the following:   A:      Reviewed prior external records [x ]      Considering/ Ordered a unique test:  Labs [x ] Imaging [ ] Stress Test  [ ] Other: Specify [ ]      Reviewed each unique test result [ ]      Assessment requiring an independent historian [ x]  B:       Independent interpretation of:   X-Ray [ ] EKG [ ]  Other: Specify  [ ]  C:       Discussion of management of tests with clinician outside of my group [ ]     -------------------------------------Risk of Morbidity-------------------------------------------------------  The Patients risk of morbidity is Low [ ] Moderate [x ] High [ ] due to the following:   Mod:  Prescription Drug Management [x ]  Decision regarding elective or emergent: minor surgery [ ] major surgery [ ]  Decision regarding hospitalization or escalation of hospital-level care [ ]  SDOH: Hearing/Vision impaired [ ] Food insecurity [ ] Homelessness/unsafe condition [ ]   Financial strain [ ] un/underemployed [ ] Lack of Transport [ ]  High:  DNR or De-Escalation of care because of poor prognosis [ ]  Drug Therapy requiring intensive monitoring for toxicity [ ]  Parenteral controlled substance [ ]  ------------------------------------------------------------------------------------------------------------------

## 2025-06-29 PROCEDURE — 99231 SBSQ HOSP IP/OBS SF/LOW 25: CPT

## 2025-06-29 RX ADMIN — SCOPOLAMINE 1 PATCH: 1 PATCH, EXTENDED RELEASE TRANSDERMAL at 19:55

## 2025-06-29 RX ADMIN — SCOPOLAMINE 1 PATCH: 1 PATCH, EXTENDED RELEASE TRANSDERMAL at 07:26

## 2025-06-29 RX ADMIN — Medication 1 APPLICATION(S): at 05:23

## 2025-06-29 NOTE — PROGRESS NOTE ADULT - NUTRITIONAL ASSESSMENT
This patient has been assessed with a concern for Malnutrition and has been determined to have a diagnosis/diagnoses of Severe protein-calorie malnutrition.    This patient is being managed with:   Diet NPO-  Entered: Jun 24 2025  8:17PM  

## 2025-06-29 NOTE — PROGRESS NOTE ADULT - ASSESSMENT
89 F with PMHx  of dementia (nonverbal), spinal stenosis, CVA (bedbound), macular degeneration, non healing sacral ulcer presents to ER with daughter for concern for wound infection.    #end of life care  -made CMO and awaiting hospice (hospice nurse to come next tuesday and pt will be able to be DCd on Monday before)    # sepsis   # infected sacral wound  - admit to medicine    - IVF,  IV abx : vanc / Cefepime -> stopped abx on 6/25 for CMO  - surgery consult - no surgical intervention  - wound care consult   - urine culture with e coli ESBL (result returned 6/28) but holding abx as CMO and having dysphagia     # r/o PE   -  CT - Questionable filling defect in the right lower lobe segmental and   subsegmental pulmonary artery branches.  -CTA positive for subsegmental PE, will not do AC and spoke with daughter regarding this, pt is CMO    # malnutrition   # dysphagia   - speech and swallow consult   - CMO    #dvt ppx - lovenox   # gi ppx   # code status - DNR/DNI, spoke with Dr. Tim from palliative and he is aware  CMO/Hospice     ---PB Handoff Note---    Pending/Follow up items (tests, labs, procedures,consults): pending DC to home on Monday 6/30    Indication for continued inpatient management:    Goals of Care note:     Family contact:  Dispo (home, SNF, etc):    Prepare for DC order [x] - updated FLO is:   DC provider note prep:    -------------------------------Time spent-----------------------------------------------------------------------  Initial visit:             mins [ ] -- 55-74 mins [ ]  Subsequent visit: 50-79 mins[ ]    -- 35-49mins [ ]     --------------------------------# and complexity of problems---------------------------------------------  [x] chronic illness with severe exacerbation , progression, treatment side effect  [ ] acute or chronic illness with threat to life or bodily funtion                         --------------------------------Complexity of data reviewed ----------------------------------------------  The Patients complexity of data reviewed  is Low [x ] Moderate [ ] High [ ] due to the following:   A:      Reviewed prior external records [ ]      Considering/ Ordered a unique test:  Labs [x] Imaging [ ] Stress Test  [ ] Other: Specify [ ]      Reviewed each unique test result [ ]      Assessment requiring an independent historian [ x]  B:       Independent interpretation of:   X-Ray [ ] EKG [ ]  Other: Specify  [ ]  C:       Discussion of management of tests with clinician outside of my group [ ]     -------------------------------------Risk of Morbidity-------------------------------------------------------  The Patients risk of morbidity is Low [ ] Moderate [x ] High [ ] due to the following:   Mod:  Prescription Drug Management [x ]  Decision regarding elective or emergent: minor surgery [ ] major surgery [ ]  Decision regarding hospitalization or escalation of hospital-level care [ ]  SDOH: Hearing/Vision impaired [ ] Food insecurity [ ] Homelessness/unsafe condition [ ]   Financial strain [ ] un/underemployed [ ] Lack of Transport [ ]  High:  DNR or De-Escalation of care because of poor prognosis [ ]  Drug Therapy requiring intensive monitoring for toxicity [ ]  Parenteral controlled substance [ ]  ------------------------------------------------------------------------------------------------------------------

## 2025-06-30 PROCEDURE — 99232 SBSQ HOSP IP/OBS MODERATE 35: CPT | Mod: 2W

## 2025-06-30 PROCEDURE — 99239 HOSP IP/OBS DSCHRG MGMT >30: CPT

## 2025-06-30 RX ORDER — LORAZEPAM 4 MG/ML
0.5 VIAL (ML) INJECTION
Qty: 45 | Refills: 0
Start: 2025-06-30 | End: 2025-07-14

## 2025-06-30 RX ADMIN — Medication 0.2 MILLIGRAM(S): at 21:34

## 2025-06-30 RX ADMIN — Medication 1 APPLICATION(S): at 05:32

## 2025-06-30 RX ADMIN — SCOPOLAMINE 1 PATCH: 1 PATCH, EXTENDED RELEASE TRANSDERMAL at 07:49

## 2025-06-30 RX ADMIN — Medication 0.2 MILLIGRAM(S): at 17:35

## 2025-06-30 RX ADMIN — SCOPOLAMINE 1 PATCH: 1 PATCH, EXTENDED RELEASE TRANSDERMAL at 19:25

## 2025-06-30 RX ADMIN — Medication 0.2 MILLIGRAM(S): at 09:05

## 2025-06-30 RX ADMIN — Medication 0.2 MILLIGRAM(S): at 22:15

## 2025-06-30 RX ADMIN — Medication 0.2 MILLIGRAM(S): at 11:42

## 2025-06-30 RX ADMIN — Medication 0.2 MILLIGRAM(S): at 18:25

## 2025-06-30 NOTE — PROGRESS NOTE ADULT - SUBJECTIVE AND OBJECTIVE BOX
HPI: 89F with dementia, nonverbal, spinal stenosis, CVA, bedbound, macular degeneration, non-healing sacral ulcer, here with concern for wound infection, and found to have sepsis from infected sacral wound, on IV vanco and cefepime, and with possible PE on lovenox, awaiting CTA. S&S pending as well for possible dysphagia. Palliative care consulted for GOC. Patient is DNR/DNI. Family possible considering hospice.    INTERVAL EVENTS  Patient lying in bed. Appears comfortable.     ADVANCE DIRECTIVES:    [ ] Full Code [X ] DNR  MOLST  [ ]  Living Will  [ ]   DECISION MAKER(s):  [ ] Health Care Proxy(s)  [ ] Surrogate(s)  [ ] Guardian           Name(s): Phone Number(s): racquel Cortes [phone]    BASELINE (I)ADL(s) (prior to admission):  Isabella: [ ]Total  [ ] Moderate [ ]Dependent  Palliative Performance Status Version 2:         %    [email or web address]/[email or web address]_performance_scale_[email or web address]    Allergies    aspirin (Unknown)  penicillin (Unknown)    Intolerances    MEDICATIONS  (STANDING):  chlorhexidine 2% Cloths 1 Application(s) Topical <User Schedule>  scopolamine 1 mG/72 Hr(s) Patch 1 Patch Transdermal every 72 hours    MEDICATIONS  (PRN):  acetaminophen  Suppository .. 650 milliGRAM(s) Rectal every 4 hours PRN Temp greater or equal to 38C (100.4F)  diazepam  Injectable 2.5 milliGRAM(s) IV Push every 4 hours PRN anxiety  HYDROmorphone  Injectable 0.2 milliGRAM(s) IV Push every 2 hours PRN Severe Pain (7 - 10)  ondansetron Injectable 4 milliGRAM(s) IV Push every 8 hours PRN Nausea and/or Vomiting        PRESENT SYMPTOMS: [X ]Unable to obtain due to poor mentation   Source if other than patient:  [ ]Family   [ ]Team   [ X]All review of systems negative including pain and dyspnea unless noted below    All components of pain assessment below addressed. Blank spaces indicate that the patient did/could not complete the assessment.  Pain: [ ]yes [ ]no  QOL impact -   Location -                    Aggravating factors -  Quality -  Radiation -  Timing-  Severity (0-10 scale):  Minimal acceptable level (0-10 scale):     CPOT:    https://www.[facility].org/getattachment/[email or web address]/Critical-Care-Pain-Observation-Tool-(CPOT)    PAIN AD Score: 0  http://geriatrictoolkit.[facility]/cog/painad.pdf (press ctrl +  left click to view)    Dyspnea:                           [ ]None[ ]Mild [ ]Moderate [ ]Severe     Respiratory Distress Observation Scale (RDOS): 0  A score of 0 to 2 signifies little or no respiratory distress, 3 signifies mild distress, scores 4 to 6 indicate moderate distress, and scores greater than 7 signify severe distress  [email or web address]://[email or web address]/sites/default/files/PDFS/[email or web address]-gvstjabbdqq-chtmwkxc-xrnnigbonte-rsgww.pdf    Anxiety:                             [ ]None[ ]Mild [ ]Moderate [ ]Severe   Fatigue:                             [ ]None[ ]Mild [ ]Moderate [ ]Severe   Nausea:                             [ ]None[ ]Mild [ ]Moderate [ ]Severe   Loss of appetite:              [ ]None[ ]Mild [ ]Moderate [ ]Severe   Constipation:                    [ ]None[ ]Mild [ ]Moderate [ ]Severe    Other Symptoms:    PHYSICAL EXAM:  Vital Signs Last 24 Hrs  T(C): --  T(F): --  HR: --  BP: --  BP(mean): --  RR: --  SpO2: --          GENERAL:  [X ] No acute distress [ ]Lethargic  [ ]Unarousable  [ ]Verbal  [ ]Non-Verbal [ ]Cachexia    BEHAVIORAL/PSYCH:  [ ]Alert and Oriented x  [ ] Anxiety [ ] Delirium [ ] Agitation [X ] Calm   EYES: [ ] No scleral icterus [ ] Scleral icterus [ ] Closed  ENMT:  [ ]Dry mouth  [ ]No external oral lesions [ X] No external ear or nose lesions  CARDIOVASCULAR:  [ ]Regular [ ]Irregular [ ]Tachy [ ]Not Tachy  [ ]Vaughn [ ] Edema [ ] No edema  PULMONARY:  [ ]Tachypnea  [ ]Audible excessive secretions [X ] No labored breathing [ ] labored breathing  GASTROINTESTINAL: [ ]Soft  [ ]Distended  [ X]Not distended [ ]Non tender [ ]Tender  MUSCULOSKELETAL: [ ]No clubbing [ ] clubbing  [ X] No cyanosis [ ] cyanosis  NEUROLOGIC: [ ]No focal deficits  [ ]Follows commands  [ ]Does not follow commands  [ ]Cognitive impairment  [ ]Dysphagia  [ ]Dysarthria  [ ]Paresis   SKIN: [ ] Jaundiced [X ] Non-jaundiced [ ]Rash [ ]No Rash [ ] Warm [ ] Dry  MISC/LINES: [ ] ET tube [ ] Trach [ ]NGT/OGT [ ]PEG [ ]Rider    CRITICAL CARE:  [ ] Shock Present  [ ]Septic [ ]Cardiogenic [ ]Neurologic [ ]Hypovolemic  [ ]  Vasopressors [ ]  Inotropes   [ ]Respiratory failure present [ ]Mechanical ventilation [ ]Non-invasive ventilatory support [ ]High flow  [ ]Acute  [ ]Chronic [ ]Hypoxic  [ ]Hypercarbic [ ]Other  [ ]Other organ failure     LABS: reviewed by me, notable for: CMO no labs    RADIOLOGY & ADDITIONAL STUDIES: CXR personally reviewed by me:  6/24: no opacity    PROTEIN CALORIE MALNUTRITION PRESENT: [ ]mild [ ]moderate [ ]severe [ ]underweight [ ]morbid obesity  https://www.andeal.org/vault/2440/web/files/ONC/Table_Clinical%20Characteristics%20to%20Document%20Malnutrition-White%20JV%20et%20al%249851.pdf    Height (cm): 137.2 (06-24-25 @ 21:27)  Weight (kg): 44 (06-24-25 @ 21:27), 49.9 (05-22-25 @ 18:29)  BMI (kg/m2): 23.4 (06-24-25 @ 21:27)    [ ]PPSV2 < or = to 30% [ ]significant weight loss  [ ]poor nutritional intake  [ ]anasarca      [ ]Artificial Nutrition          Palliative Care Spiritual/Emotional Screening Tool Question  Severity (0-4):                    OR                    [X ] Unable to determine/NA  Score of 2 or greater indicates recommendation of Chaplaincy referral  Chaplaincy Referral: [ ] Yes [ ] Refused [ ] Following     Caregiver [facility]:  [ ] Yes [ ] No    OR    [x ] Unable to determine. Will assess at later time if appropriate.  Social Work Referral [ ]  Patient and Family Centered Care Referral [ ]    Anticipatory Grief Present: [ ] Yes [ ] No    OR     [ x] Unable to determine. Will assess at later time if appropriate.  Social Work Referral [ ]  Patient and Family Centered Care Referral [ ]    REFERRALS:   [ ]Chaplaincy  [ ]Hospice  [ ]Child Life  [ ]Social Work  [ ]Case management [ ]Holistic Therapy     Palliative care education provided to patient and/or family    Goals of Care Document:     ______________  Parag Tim MD  Palliative Medicine  [facility]   of Geriatric and Palliative Medicine  [phone]  
Patient is a 89y old  Female who presents with a chief complaint of sacral ulcer (06-27-25)      Pt seen and examined at bedside. No CP or SOB.          PAST MEDICAL & SURGICAL HISTORY:  HTN (hypertension)    CVA (cerebrovascular accident)    Dementia    Chronic atrial fibrillation    History of macular degeneration    Arthritis    Presence of Watchman left atrial appendage closure device    Fracture, tibia and fibula        VITAL SIGNS (Last 24 hrs):  T(C): 36.7 (06-26-25 @ 22:00), Max: 38.3 (06-26-25 @ 21:09)  HR: --  BP: --  RR: --  SpO2: --  Wt(kg): --  Daily     Daily     I&O's Summary      PHYSICAL EXAM:  GENERAL: NAD, well-developed  HEAD:  Atraumatic, Normocephalic  EYES: EOMI, PERRLA, conjunctiva and sclera clear  NECK: Supple, No JVD  CHEST/LUNG: Clear to auscultation bilaterally; No wheeze  HEART: Regular rate and rhythm; No murmurs, rubs, or gallops  ABDOMEN: Soft, Nontender, Nondistended; Bowel sounds present  EXTREMITIES:  2+ Peripheral Pulses, No clubbing, cyanosis, or edema  PSYCH: AAOx3  NEUROLOGY: non-focal  SKIN: No rashes or lesions    Labs Reviewed  Spoke to patient in regards to abnormal labs.    CBC Full  -  ( 25 Jun 2025 07:23 )  WBC Count : 10.69 K/uL  Hemoglobin : 11.1 g/dL  Hematocrit : 36.4 %  Platelet Count - Automated : 261 K/uL  Mean Cell Volume : 88.6 fl  Mean Cell Hemoglobin : 27.0 pg  Mean Cell Hemoglobin Concentration : 30.5 g/dL  Auto Neutrophil # : x  Auto Lymphocyte # : x  Auto Monocyte # : x  Auto Eosinophil # : x  Auto Basophil # : x  Auto Neutrophil % : x  Auto Lymphocyte % : x  Auto Monocyte % : x  Auto Eosinophil % : x  Auto Basophil % : x    BMP:    06-25 @ 07:23    Blood Urea Nitrogen - 25  Calcium - 8.2  Carbond Dioxide - 24  Chloride - 104  Creatinine - 1.0  Glucose - 63  Potassium - 3.5  Sodium - 143      Hemoglobin A1c -   PT/INR - ( 24 Jun 2025 14:55 )   PT: 13.50 sec;   INR: 1.14 ratio         PTT - ( 24 Jun 2025 14:55 )  PTT:28.1 sec  Urine Culture:  06-24 @ 14:55 Urine culture: --    Culture Results:   No growth at 48 Hours  Method Type: --  Organism: --  Organism Identification: --  Specimen Source: Blood Blood-Peripheral        COVID Labs  CRP:      D-Dimer:            Imaging reviewed independently and reviewed read        MEDICATIONS  (STANDING):  chlorhexidine 2% Cloths 1 Application(s) Topical <User Schedule>  scopolamine 1 mG/72 Hr(s) Patch 1 Patch Transdermal every 72 hours    MEDICATIONS  (PRN):  acetaminophen  Suppository .. 650 milliGRAM(s) Rectal every 4 hours PRN Temp greater or equal to 38C (100.4F)  diazepam  Injectable 2.5 milliGRAM(s) IV Push every 4 hours PRN anxiety  HYDROmorphone  Injectable 0.2 milliGRAM(s) IV Push every 2 hours PRN Severe Pain (7 - 10)  ondansetron Injectable 4 milliGRAM(s) IV Push every 8 hours PRN Nausea and/or Vomiting      
Patient is a 89y old  Female who presents with a chief complaint of sacral ulcer (06-26-25)      Pt seen and examined at bedside. No CP or SOB.          PAST MEDICAL & SURGICAL HISTORY:  HTN (hypertension)    CVA (cerebrovascular accident)    Dementia    Chronic atrial fibrillation    History of macular degeneration    Arthritis    Presence of Watchman left atrial appendage closure device    Fracture, tibia and fibula        VITAL SIGNS (Last 24 hrs):  T(C): --  HR: --  BP: --  RR: --  SpO2: --  Wt(kg): --  Daily     Daily     I&O's Summary      PHYSICAL EXAM:  GENERAL: NAD, well-developed  HEAD:  Atraumatic, Normocephalic  EYES: EOMI, PERRLA, conjunctiva and sclera clear  NECK: Supple, No JVD  CHEST/LUNG: Clear to auscultation bilaterally; No wheeze  HEART: Regular rate and rhythm; No murmurs, rubs, or gallops  ABDOMEN: Soft, Nontender, Nondistended; Bowel sounds present  EXTREMITIES:  2+ Peripheral Pulses, No clubbing, cyanosis, or edema  NEUROLOGY: non-focal  SKIN: No rashes or lesions    Labs Reviewed  Spoke to patient in regards to abnormal labs.    CBC Full  -  ( 25 Jun 2025 07:23 )  WBC Count : 10.69 K/uL  Hemoglobin : 11.1 g/dL  Hematocrit : 36.4 %  Platelet Count - Automated : 261 K/uL  Mean Cell Volume : 88.6 fl  Mean Cell Hemoglobin : 27.0 pg  Mean Cell Hemoglobin Concentration : 30.5 g/dL  Auto Neutrophil # : x  Auto Lymphocyte # : x  Auto Monocyte # : x  Auto Eosinophil # : x  Auto Basophil # : x  Auto Neutrophil % : x  Auto Lymphocyte % : x  Auto Monocyte % : x  Auto Eosinophil % : x  Auto Basophil % : x    BMP:    06-25 @ 07:23    Blood Urea Nitrogen - 25  Calcium - 8.2  Carbond Dioxide - 24  Chloride - 104  Creatinine - 1.0  Glucose - 63  Potassium - 3.5  Sodium - 143      Hemoglobin A1c -   PT/INR - ( 24 Jun 2025 14:55 )   PT: 13.50 sec;   INR: 1.14 ratio         PTT - ( 24 Jun 2025 14:55 )  PTT:28.1 sec  Urine Culture:  06-24 @ 14:55 Urine culture: --    Culture Results:   No growth at 24 hours  Method Type: --  Organism: --  Organism Identification: --  Specimen Source: Blood Blood-Peripheral        COVID Labs  CRP:      D-Dimer:            Imaging reviewed independently and reviewed read        MEDICATIONS  (STANDING):  chlorhexidine 2% Cloths 1 Application(s) Topical <User Schedule>  scopolamine 1 mG/72 Hr(s) Patch 1 Patch Transdermal every 72 hours    MEDICATIONS  (PRN):  acetaminophen  Suppository .. 650 milliGRAM(s) Rectal every 4 hours PRN Temp greater or equal to 38C (100.4F)  diazepam  Injectable 2.5 milliGRAM(s) IV Push every 4 hours PRN anxiety  morphine  - Injectable 2 milliGRAM(s) IV Push every 2 hours PRN Moderate pain (4-6), Severe pain (7-10), Respiratory rate greater than 22  ondansetron Injectable 4 milliGRAM(s) IV Push every 8 hours PRN Nausea and/or Vomiting      
Patient is a 89y old  Female who presents with a chief complaint of sacral ulcer (06-25-25)      Pt seen and examined at bedside. pt is minimally responsive to verbal stimulation and lethargic but responsive to painful stimuli. Had extensive conversation with daughter on the phone today (Sophia), we will make pt CMO and daughter spoke with CM and hospice referral placed.          PAST MEDICAL & SURGICAL HISTORY:  HTN (hypertension)    CVA (cerebrovascular accident)    Dementia    Chronic atrial fibrillation    History of macular degeneration    Arthritis    Presence of Watchman left atrial appendage closure device    Fracture, tibia and fibula        VITAL SIGNS (Last 24 hrs):  T(C): 36.6 (06-25-25 @ 04:52), Max: 36.7 (06-24-25 @ 21:27)  HR: 70 (06-25-25 @ 04:52) (61 - 70)  BP: 108/69 (06-25-25 @ 04:52) (108/69 - 135/69)  RR: 18 (06-25-25 @ 04:52) (18 - 18)  SpO2: 97% (06-25-25 @ 04:52) (97% - 97%)  Wt(kg): --  Daily Height in cm: 137.16 (24 Jun 2025 21:27)    Daily     I&O's Summary      PHYSICAL EXAM:  GENERAL: NAD, well-developed  HEAD:  Atraumatic, Normocephalic  EYES: EOMI, PERRLA, conjunctiva and sclera clear  NECK: Supple, No JVD  CHEST/LUNG: Clear to auscultation bilaterally; No wheeze  HEART: Regular rate and rhythm; No murmurs, rubs, or gallops  ABDOMEN: Soft, Nontender, Nondistended; Bowel sounds present  EXTREMITIES:  2+ Peripheral Pulses, No clubbing, cyanosis, or edema  NEUROLOGY:  lethargic, but responsive to painful stimuli  SKIN: No rashes or lesions    Labs Reviewed  Spoke to patient in regards to abnormal labs.    CBC Full  -  ( 25 Jun 2025 07:23 )  WBC Count : 10.69 K/uL  Hemoglobin : 11.1 g/dL  Hematocrit : 36.4 %  Platelet Count - Automated : 261 K/uL  Mean Cell Volume : 88.6 fl  Mean Cell Hemoglobin : 27.0 pg  Mean Cell Hemoglobin Concentration : 30.5 g/dL  Auto Neutrophil # : x  Auto Lymphocyte # : x  Auto Monocyte # : x  Auto Eosinophil # : x  Auto Basophil # : x  Auto Neutrophil % : x  Auto Lymphocyte % : x  Auto Monocyte % : x  Auto Eosinophil % : x  Auto Basophil % : x    BMP:    06-25 @ 07:23    Blood Urea Nitrogen - 25  Calcium - 8.2  Carbond Dioxide - 24  Chloride - 104  Creatinine - 1.0  Glucose - 63  Potassium - 3.5  Sodium - 143      Hemoglobin A1c -   PT/INR - ( 24 Jun 2025 14:55 )   PT: 13.50 sec;   INR: 1.14 ratio         PTT - ( 24 Jun 2025 14:55 )  PTT:28.1 sec  Urine Culture:        COVID Labs  CRP:      D-Dimer:            Imaging reviewed independently and reviewed read        MEDICATIONS  (STANDING):  chlorhexidine 2% Cloths 1 Application(s) Topical <User Schedule>  scopolamine 1 mG/72 Hr(s) Patch 1 Patch Transdermal every 72 hours    MEDICATIONS  (PRN):  acetaminophen  Suppository .. 650 milliGRAM(s) Rectal every 4 hours PRN Temp greater or equal to 38C (100.4F)  diazepam  Injectable 2.5 milliGRAM(s) IV Push every 4 hours PRN anxiety  morphine  - Injectable 2 milliGRAM(s) IV Push every 2 hours PRN Moderate pain (4-6), Severe pain (7-10), Respiratory rate greater than 22  ondansetron Injectable 4 milliGRAM(s) IV Push every 8 hours PRN Nausea and/or Vomiting      
Patient is a 89y old  Female who presents with a chief complaint of sacral ulcer (06-28-25)      Pt seen and examined at bedside. No CP or SOB.          PAST MEDICAL & SURGICAL HISTORY:  HTN (hypertension)    CVA (cerebrovascular accident)    Dementia    Chronic atrial fibrillation    History of macular degeneration    Arthritis    Presence of Watchman left atrial appendage closure device    Fracture, tibia and fibula        VITAL SIGNS (Last 24 hrs):  T(C): --  HR: --  BP: --  RR: --  SpO2: --  Wt(kg): --  Daily     Daily     I&O's Summary      PHYSICAL EXAM:  GENERAL: NAD, well-developed  HEAD:  Atraumatic, Normocephalic  EYES: EOMI, PERRLA, conjunctiva and sclera clear  NECK: Supple, No JVD  CHEST/LUNG: Clear to auscultation bilaterally; No wheeze  HEART: Regular rate and rhythm; No murmurs, rubs, or gallops  ABDOMEN: Soft, Nontender, Nondistended; Bowel sounds present  EXTREMITIES:  2+ Peripheral Pulses, No clubbing, cyanosis, or edema  PSYCH: AAOx3  NEUROLOGY: non-focal  SKIN: No rashes or lesions    Labs Reviewed  Spoke to patient in regards to abnormal labs.      BMP:      Hemoglobin A1c -     Urine Culture:        COVID Labs  CRP:      D-Dimer:            Imaging reviewed independently and reviewed read        MEDICATIONS  (STANDING):  chlorhexidine 2% Cloths 1 Application(s) Topical <User Schedule>  scopolamine 1 mG/72 Hr(s) Patch 1 Patch Transdermal every 72 hours    MEDICATIONS  (PRN):  acetaminophen  Suppository .. 650 milliGRAM(s) Rectal every 4 hours PRN Temp greater or equal to 38C (100.4F)  diazepam  Injectable 2.5 milliGRAM(s) IV Push every 4 hours PRN anxiety  HYDROmorphone  Injectable 0.2 milliGRAM(s) IV Push every 2 hours PRN Severe Pain (7 - 10)  ondansetron Injectable 4 milliGRAM(s) IV Push every 8 hours PRN Nausea and/or Vomiting      
Patient is a 89y old  Female who presents with a chief complaint of sacral ulcer (06-27-25)      Pt seen and examined at bedside. No CP or SOB. pt is CMO          PAST MEDICAL & SURGICAL HISTORY:  HTN (hypertension)    CVA (cerebrovascular accident)    Dementia    Chronic atrial fibrillation    History of macular degeneration    Arthritis    Presence of Watchman left atrial appendage closure device    Fracture, tibia and fibula        VITAL SIGNS (Last 24 hrs):  T(C): --  HR: --  BP: --  RR: --  SpO2: --  Wt(kg): --  Daily     Daily     I&O's Summary      PHYSICAL EXAM:  GENERAL: NAD, well-developed  HEAD:  Atraumatic, Normocephalic  EYES: EOMI, PERRLA, conjunctiva and sclera clear  NECK: Supple, No JVD  CHEST/LUNG: Clear to auscultation bilaterally; No wheeze  HEART: Regular rate and rhythm; No murmurs, rubs, or gallops  ABDOMEN: Soft, Nontender, Nondistended; Bowel sounds present  EXTREMITIES:  2+ Peripheral Pulses, No clubbing, cyanosis, or edema  NEUROLOGY: non-focal  SKIN: No rashes or lesions    Labs Reviewed  Spoke to patient in regards to abnormal labs.    CBC Full  -  ( 25 Jun 2025 07:23 )  WBC Count : 10.69 K/uL  Hemoglobin : 11.1 g/dL  Hematocrit : 36.4 %  Platelet Count - Automated : 261 K/uL  Mean Cell Volume : 88.6 fl  Mean Cell Hemoglobin : 27.0 pg  Mean Cell Hemoglobin Concentration : 30.5 g/dL  Auto Neutrophil # : x  Auto Lymphocyte # : x  Auto Monocyte # : x  Auto Eosinophil # : x  Auto Basophil # : x  Auto Neutrophil % : x  Auto Lymphocyte % : x  Auto Monocyte % : x  Auto Eosinophil % : x  Auto Basophil % : x    BMP:    06-25 @ 07:23    Blood Urea Nitrogen - 25  Calcium - 8.2  Carbond Dioxide - 24  Chloride - 104  Creatinine - 1.0  Glucose - 63  Potassium - 3.5  Sodium - 143      Hemoglobin A1c -     Urine Culture:  06-24 @ 14:55 Urine culture: --    Culture Results:   No growth at 72 Hours  Method Type: VINCENZO  Organism: Escherichia coli ESBL  Organism Identification: Escherichia coli ESBL  Specimen Source: Blood Blood-Peripheral        COVID Labs  CRP:      D-Dimer:            Imaging reviewed independently and reviewed read        MEDICATIONS  (STANDING):  chlorhexidine 2% Cloths 1 Application(s) Topical <User Schedule>  scopolamine 1 mG/72 Hr(s) Patch 1 Patch Transdermal every 72 hours    MEDICATIONS  (PRN):  acetaminophen  Suppository .. 650 milliGRAM(s) Rectal every 4 hours PRN Temp greater or equal to 38C (100.4F)  diazepam  Injectable 2.5 milliGRAM(s) IV Push every 4 hours PRN anxiety  HYDROmorphone  Injectable 0.2 milliGRAM(s) IV Push every 2 hours PRN Severe Pain (7 - 10)  ondansetron Injectable 4 milliGRAM(s) IV Push every 8 hours PRN Nausea and/or Vomiting      
HPI: 89F with dementia, nonverbal, spinal stenosis, CVA, bedbound, macular degeneration, non-healing sacral ulcer, here with concern for wound infection, and found to have sepsis from infected sacral wound, on IV vanco and cefepime, and with possible PE on lovenox, awaiting CTA. S&S pending as well for possible dysphagia. Palliative care consulted for GOC. Patient is DNR/DNI. Family possible considering hospice.    INTERVAL EVENTS  6/26: patient appears comfortable, used diazepam and morphine x 1 each/24 hours    ADVANCE DIRECTIVES:    [ ] Full Code [X ] DNR  MOLST  [ ]  Living Will  [ ]   DECISION MAKER(s):  [ ] Health Care Proxy(s)  [ ] Surrogate(s)  [ ] Guardian           Name(s): Phone Number(s): racquel Cortes [phone]    BASELINE (I)ADL(s) (prior to admission):  [place]: [ ]Total  [ ] Moderate [ ]Dependent  Palliative Performance Status Version 2:         %    http://[facility].org/files/news/palliative_performance_scale_ppsv2.pdf    Allergies    aspirin (Unknown)  penicillin (Unknown)    Intolerances    MEDICATIONS  (STANDING):  chlorhexidine 2% Cloths 1 Application(s) Topical <User Schedule>  scopolamine 1 mG/72 Hr(s) Patch 1 Patch Transdermal every 72 hours    MEDICATIONS  (PRN):  acetaminophen  Suppository .. 650 milliGRAM(s) Rectal every 4 hours PRN Temp greater or equal to 38C (100.4F)  diazepam  Injectable 2.5 milliGRAM(s) IV Push every 4 hours PRN anxiety  morphine  - Injectable 2 milliGRAM(s) IV Push every 2 hours PRN Moderate pain (4-6), Severe pain (7-10), Respiratory rate greater than 22  ondansetron Injectable 4 milliGRAM(s) IV Push every 8 hours PRN Nausea and/or Vomiting      PRESENT SYMPTOMS: [X ]Unable to obtain due to poor mentation   Source if other than patient:  [ ]Family   [ ]Team   [ X]All review of systems negative including pain and dyspnea unless noted below    All components of pain assessment below addressed. Blank spaces indicate that the patient did/could not complete the assessment.  Pain: [ ]yes [ ]no  QOL impact -   Location -                    Aggravating factors -  Quality -  Radiation -  Timing-  Severity (0-10 scale):  Minimal acceptable level (0-10 scale):     CPOT:    https://www.[facility].org/getattachment/[email or web address]/Critical-Care-Pain-Observation-Tool-(CPOT)    PAIN AD Score: 0  http://geriatrictoolkit.[facility]/cog/painad.pdf (press ctrl +  left click to view)    Dyspnea:                           [ ]None[ ]Mild [ ]Moderate [ ]Severe     Respiratory Distress Observation Scale (RDOS): 0  A score of 0 to 2 signifies little or no respiratory distress, 3 signifies mild distress, scores 4 to 6 indicate moderate distress, and scores greater than 7 signify severe distress  [email or web address]://www.[facility].ca/sites/default/files/PDFS/086015-lmfykvhrzma-gvddzkzq-ascpvrdibqp-zbbow.pdf    Anxiety:                             [ ]None[ ]Mild [ ]Moderate [ ]Severe   Fatigue:                             [ ]None[ ]Mild [ ]Moderate [ ]Severe   Nausea:                             [ ]None[ ]Mild [ ]Moderate [ ]Severe   Loss of appetite:              [ ]None[ ]Mild [ ]Moderate [ ]Severe   Constipation:                    [ ]None[ ]Mild [ ]Moderate [ ]Severe    Other Symptoms:    PHYSICAL EXAM:  Vital Signs Last 24 Hrs  T(C): --  T(F): --  HR: --  BP: --  BP(mean): --  RR: --  SpO2: --          GENERAL:  [X ] No acute distress [ ]Lethargic  [ ]Unarousable  [ ]Verbal  [ ]Non-Verbal [ ]Cachexia    BEHAVIORAL/PSYCH:  [ ]Alert and Oriented x  [ ] Anxiety [ ] Delirium [ ] Agitation [X ] Calm   EYES: [ ] No scleral icterus [ ] Scleral icterus [ ] Closed  ENMT:  [ ]Dry mouth  [ ]No external oral lesions [ X] No external ear or nose lesions  CARDIOVASCULAR:  [ ]Regular [ ]Irregular [ ]Tachy [ ]Not Tachy  [ ]Vaughn [ ] Edema [ ] No edema  PULMONARY:  [ ]Tachypnea  [ ]Audible excessive secretions [X ] No labored breathing [ ] labored breathing  GASTROINTESTINAL: [ ]Soft  [ ]Distended  [ X]Not distended [ ]Non tender [ ]Tender  MUSCULOSKELETAL: [ ]No clubbing [ ] clubbing  [ X] No cyanosis [ ] cyanosis  NEUROLOGIC: [ ]No focal deficits  [ ]Follows commands  [ ]Does not follow commands  [ ]Cognitive impairment  [ ]Dysphagia  [ ]Dysarthria  [ ]Paresis   SKIN: [ ] Jaundiced [X ] Non-jaundiced [ ]Rash [ ]No Rash [ ] Warm [ ] Dry  MISC/LINES: [ ] ET tube [ ] Trach [ ]NGT/OGT [ ]PEG [ ]Rider    CRITICAL CARE:  [ ] Shock Present  [ ]Septic [ ]Cardiogenic [ ]Neurologic [ ]Hypovolemic  [ ]  Vasopressors [ ]  Inotropes   [ ]Respiratory failure present [ ]Mechanical ventilation [ ]Non-invasive ventilatory support [ ]High flow  [ ]Acute  [ ]Chronic [ ]Hypoxic  [ ]Hypercarbic [ ]Other  [ ]Other organ failure     LABS: reviewed by me, notable for: CMO no labs    RADIOLOGY & ADDITIONAL STUDIES: CXR personally reviewed by me:  6/24: no opacity    PROTEIN CALORIE MALNUTRITION PRESENT: [ ]mild [ ]moderate [ ]severe [ ]underweight [ ]morbid obesity  https://www.andeal.org/vault/2440/web/files/ONC/Table_Clinical%20Characteristics%20to%20Document%20Malnutrition-White%20JV%20et%20al%603823.pdf    Height (cm): 137.2 (06-24-25 @ 21:27)  Weight (kg): 44 (06-24-25 @ 21:27), 49.9 (05-22-25 @ 18:29)  BMI (kg/m2): 23.4 (06-24-25 @ 21:27)    [ ]PPSV2 < or = to 30% [ ]significant weight loss  [ ]poor nutritional intake  [ ]anasarca      [ ]Artificial Nutrition          Palliative Care Spiritual/Emotional Screening Tool Question  Severity (0-4):                    OR                    [X ] Unable to determine/NA  Score of 2 or greater indicates recommendation of Chaplaincy referral  Chaplaincy Referral: [ ] Yes [ ] Refused [ ] Following     Caregiver [place]:  [ ] Yes [ ] No    OR    [x ] Unable to determine. Will assess at later time if appropriate.  Social Work Referral [ ]  Patient and Family Centered Care Referral [ ]    Anticipatory Grief Present: [ ] Yes [ ] No    OR     [ x] Unable to determine. Will assess at later time if appropriate.  Social Work Referral [ ]  Patient and Family Centered Care Referral [ ]    REFERRALS:   [ ]Chaplaincy  [ ]Hospice  [ ]Child Life  [ ]Social Work  [ ]Case management [ ]Holistic Therapy     Palliative care education provided to patient and/or family    Goals of Care Document:     ______________  Parag Tim MD  Palliative Medicine  [facility]   of Geriatric and Palliative Medicine  [phone]  
HPI: 89F with dementia, nonverbal, spinal stenosis, CVA, bedbound, macular degeneration, non-healing sacral ulcer, here with concern for wound infection, and found to have sepsis from infected sacral wound, on IV vanco and cefepime, and with possible PE on lovenox, awaiting CTA. S&S pending as well for possible dysphagia. Palliative care consulted for GOC. Patient is DNR/DNI. Family possible considering hospice.    INTERVAL EVENTS  6/26: patient appears comfortable, used diazepam and morphine x 1 each/24 hours  6/27: no acute distress, used morphine x 1/24 hours    ADVANCE DIRECTIVES:    [ ] Full Code [X ] DNR  MOLST  [ ]  Living Will  [ ]   DECISION MAKER(s):  [ ] Health Care Proxy(s)  [ ] Surrogate(s)  [ ] Guardian           Name(s): Phone Number(s): racquel Cortes [phone]    BASELINE (I)ADL(s) (prior to admission):  [place]: [ ]Total  [ ] Moderate [ ]Dependent  Palliative Performance Status Version 2:         %    http://npcrc.org/files/news/palliative_performance_scale_ppsv2.pdf    Allergies    aspirin (Unknown)  penicillin (Unknown)    Intolerances    MEDICATIONS  (STANDING):  chlorhexidine 2% Cloths 1 Application(s) Topical <User Schedule>  scopolamine 1 mG/72 Hr(s) Patch 1 Patch Transdermal every 72 hours    MEDICATIONS  (PRN):  acetaminophen  Suppository .. 650 milliGRAM(s) Rectal every 4 hours PRN Temp greater or equal to 38C (100.4F)  diazepam  Injectable 2.5 milliGRAM(s) IV Push every 4 hours PRN anxiety  morphine  - Injectable 2 milliGRAM(s) IV Push every 2 hours PRN Moderate pain (4-6), Severe pain (7-10), Respiratory rate greater than 22  ondansetron Injectable 4 milliGRAM(s) IV Push every 8 hours PRN Nausea and/or Vomiting      PRESENT SYMPTOMS: [X ]Unable to obtain due to poor mentation   Source if other than patient:  [ ]Family   [ ]Team   [ X]All review of systems negative including pain and dyspnea unless noted below    All components of pain assessment below addressed. Blank spaces indicate that the patient did/could not complete the assessment.  Pain: [ ]yes [ ]no  QOL impact -   Location -                    Aggravating factors -  Quality -  Radiation -  Timing-  Severity (0-10 scale):  Minimal acceptable level (0-10 scale):     CPOT:    https://www.[facility].org/getattachment/pmr52w81-2x6r-1t3l-[email or web address]-[email or web address]/Critical-Care-Pain-Observation-Tool-(CPOT)    PAIN AD Score: 0  http://geriatrictoolkit.[facility]/cog/painad.pdf (press ctrl +  left click to view)    Dyspnea:                           [ ]None[ ]Mild [ ]Moderate [ ]Severe     Respiratory Distress Observation Scale (RDOS): 0  A score of 0 to 2 signifies little or no respiratory distress, 3 signifies mild distress, scores 4 to 6 indicate moderate distress, and scores greater than 7 signify severe distress  https://www.[facility].ca/sites/default/files/PDFS/116263-xtamwjtrxwt-wctdseng-wjhptgwfeii-gsnqo.pdf    Anxiety:                             [ ]None[ ]Mild [ ]Moderate [ ]Severe   Fatigue:                             [ ]None[ ]Mild [ ]Moderate [ ]Severe   Nausea:                             [ ]None[ ]Mild [ ]Moderate [ ]Severe   Loss of appetite:              [ ]None[ ]Mild [ ]Moderate [ ]Severe   Constipation:                    [ ]None[ ]Mild [ ]Moderate [ ]Severe    Other Symptoms:    PHYSICAL EXAM:  Vital Signs Last 24 Hrs  T(C): --  T(F): --  HR: --  BP: --  BP(mean): --  RR: --  SpO2: --          GENERAL:  [X ] No acute distress [ ]Lethargic  [ ]Unarousable  [ ]Verbal  [ ]Non-Verbal [ ]Cachexia    BEHAVIORAL/PSYCH:  [ ]Alert and Oriented x  [ ] Anxiety [ ] Delirium [ ] Agitation [X ] Calm   EYES: [ ] No scleral icterus [ ] Scleral icterus [ ] Closed  ENMT:  [ ]Dry mouth  [ ]No external oral lesions [ X] No external ear or nose lesions  CARDIOVASCULAR:  [ ]Regular [ ]Irregular [ ]Tachy [ ]Not Tachy  [ ]Vaughn [ ] Edema [ ] No edema  PULMONARY:  [ ]Tachypnea  [ ]Audible excessive secretions [X ] No labored breathing [ ] labored breathing  GASTROINTESTINAL: [ ]Soft  [ ]Distended  [ X]Not distended [ ]Non tender [ ]Tender  MUSCULOSKELETAL: [ ]No clubbing [ ] clubbing  [ X] No cyanosis [ ] cyanosis  NEUROLOGIC: [ ]No focal deficits  [ ]Follows commands  [ ]Does not follow commands  [ ]Cognitive impairment  [ ]Dysphagia  [ ]Dysarthria  [ ]Paresis   SKIN: [ ] Jaundiced [X ] Non-jaundiced [ ]Rash [ ]No Rash [ ] Warm [ ] Dry  MISC/LINES: [ ] ET tube [ ] Trach [ ]NGT/OGT [ ]PEG [ ]Rider    CRITICAL CARE:  [ ] Shock Present  [ ]Septic [ ]Cardiogenic [ ]Neurologic [ ]Hypovolemic  [ ]  Vasopressors [ ]  Inotropes   [ ]Respiratory failure present [ ]Mechanical ventilation [ ]Non-invasive ventilatory support [ ]High flow  [ ]Acute  [ ]Chronic [ ]Hypoxic  [ ]Hypercarbic [ ]Other  [ ]Other organ failure     LABS: reviewed by me, notable for: CMO no labs    RADIOLOGY & ADDITIONAL STUDIES: CXR personally reviewed by me:  6/24: no opacity    PROTEIN CALORIE MALNUTRITION PRESENT: [ ]mild [ ]moderate [ ]severe [ ]underweight [ ]morbid obesity  https://www.andeal.org/vault/2440/web/files/ONC/Table_Clinical%20Characteristics%20to%20Document%20Malnutrition-White%20JV%20et%20al%636268.pdf    Height (cm): 137.2 (06-24-25 @ 21:27)  Weight (kg): 44 (06-24-25 @ 21:27), 49.9 (05-22-25 @ 18:29)  BMI (kg/m2): 23.4 (06-24-25 @ 21:27)    [ ]PPSV2 < or = to 30% [ ]significant weight loss  [ ]poor nutritional intake  [ ]anasarca      [ ]Artificial Nutrition          Palliative Care Spiritual/Emotional Screening Tool Question  Severity (0-4):                    OR                    [X ] Unable to determine/NA  Score of 2 or greater indicates recommendation of Chaplaincy referral  Chaplaincy Referral: [ ] Yes [ ] Refused [ ] Following     Caregiver [place]:  [ ] Yes [ ] No    OR    [x ] Unable to determine. Will assess at later time if appropriate.  Social Work Referral [ ]  Patient and Family Centered Care Referral [ ]    Anticipatory Grief Present: [ ] Yes [ ] No    OR     [ x] Unable to determine. Will assess at later time if appropriate.  Social Work Referral [ ]  Patient and Family Centered Care Referral [ ]    REFERRALS:   [ ]Chaplaincy  [ ]Hospice  [ ]Child Life  [ ]Social Work  [ ]Case management [ ]Holistic Therapy     Palliative care education provided to patient and/or family    Goals of Care Document:     ______________  Parag Tim MD  Palliative Medicine  [facility]   of Geriatric and Palliative Medicine  [phone]

## 2025-06-30 NOTE — DISCHARGE NOTE PROVIDER - CARE PROVIDER_API CALL
Kian Sargent  Internal Medicine  [address] [place] [address], Admin - Room [address] [place] [address]  Phone: [phone]  Fax: [phone]  Follow Up Time:

## 2025-06-30 NOTE — DISCHARGE NOTE PROVIDER - CARE PROVIDERS DIRECT ADDRESSES
[email or web address]

## 2025-06-30 NOTE — DISCHARGE NOTE PROVIDER - ATTENDING ATTESTATION STATEMENT
[Dear  ___] : Dear  [unfilled],
I have personally seen and examined the patient. I have collaborated with and supervised the

## 2025-06-30 NOTE — DISCHARGE NOTE PROVIDER - DETAILS OF MALNUTRITION DIAGNOSIS/DIAGNOSES
This patient has been assessed with a concern for Malnutrition and was treated during this hospitalization for the following Nutrition diagnosis/diagnoses:     -  06/26/2025: Severe protein-calorie malnutrition

## 2025-06-30 NOTE — CDI QUERY NOTE - NSCDIOTHERTXTBX_GEN_ALL_CORE_HH
Although sepsis is documented in the medical record, it lacks the specific indicators to clinically substantiate and support the diagnosis.     In order to ensure accurate coding and accuracy of the clinical record, the documentation in this patient’s medical record requires additional clarification of the diagnosis.    Please reference the attached [facility] sepsis ED sepsis/severe sepsis management algorithm     Please clarify the status of sepsis in your progress note and/or discharge summary:    • The patient’s sepsis is ruled out after study  • The patient’s sepsis is ruled in (please document additional supporting information or mitigating factors, sources of infection to support this diagnosis)  • Other (specify)    Supporting documentation:   6/29 Progress Note Adult-Hospitalist Attending  # sepsis   # infected sacral wound  - admit to medicine    - IVF,  IV abx : vanc / Cefepime -> stopped abx on 6/25 for CMO  - surgery consult - no surgical intervention  - wound care consult   - urine culture with e coli ESBL (result returned 6/28) but holding abx as CMO and having dysphagia     Vital signs  6/24 @ 1415 : T: 98.2, HR: 100, R: 20  6/24 @ 1432: T: 101(rectal)  6/24 @ 1710: HR: 90    Lab results  6/24: WBC: 10.23  6/25: WBC: 10.69  6/24: Urine culture: >100,000 CFU/ ml Escherichia coli, ESBL     Antimicrobials  6/24: Cefepime 2000mg IV, Indication: sepsis, x 1 dose.  6/24: Vancomycin 1000mg IV, Indication: sepsis x 1 dose.

## 2025-06-30 NOTE — DISCHARGE NOTE PROVIDER - NSDCCPCAREPLAN_GEN_ALL_CORE_FT
PRINCIPAL DISCHARGE DIAGNOSIS  Diagnosis: Sacral decubitus ulcer  Assessment and Plan of Treatment: you were seen in the hospital because of concern for wound infection of the sacral decubitus ulcer, you had presented with sepsis and were started with antibiotics. you were also found to have a pulmonary embolism. due to the severity of the nature of your case, we had joint discussion with your family and we pursued comfort measures only. you will be going home with hospice care. i sent prescriptions to [facility] for the oral morphine and oral ativan for home. if you have any concerns please contact your hospice nurse or your primary care provider

## 2025-06-30 NOTE — PROGRESS NOTE ADULT - PROVIDER SPECIALTY LIST ADULT
Hospitalist
Palliative Care

## 2025-06-30 NOTE — HOSPICE CARE NOTE - CONVESATION DETAILS
Update to D/C plan- Please escribe roxanol for family  prior to D/C home Tuesday. Please coordinate D/C and transport for 8:30 AM. PIYUSH busby.

## 2025-06-30 NOTE — PROGRESS NOTE ADULT - PROBLEM/PLAN-1
Spoke with Dr. Oswald's office, they will [facility].  Given number to talk to anesthesia re:not having anyone with her at home overnight.   
DISPLAY PLAN FREE TEXT

## 2025-06-30 NOTE — PROGRESS NOTE ADULT - REASON FOR ADMISSION
sacral ulcer

## 2025-06-30 NOTE — DISCHARGE NOTE PROVIDER - HOSPITAL COURSE
89 F with PMHx  of dementia (nonverbal), spinal stenosis, CVA (bedbound), macular degeneration, non healing sacral ulcer presents to ER with daughter for concern for wound infection.    #end of life care  -made CMO and awaiting hospice (hospice nurse to come next tuesday and pt will be able to be DCd on Monday before)    # sepsis   # infected sacral wound  - admit to medicine    - IVF,  IV abx : vanc / Cefepime -> stopped abx on 6/25 for CMO  - surgery consult - no surgical intervention  - wound care consult   - urine culture with e coli ESBL (result returned 6/28) but holding abx as CMO and having dysphagia     # r/o PE   -  CT - Questionable filling defect in the right lower lobe segmental and   subsegmental pulmonary artery branches.  -CTA positive for subsegmental PE, will not do AC and spoke with daughter regarding this, pt is CMO    # malnutrition   # dysphagia   - speech and swallow consult   - CMO    pt seen and examined on the day of discharge  Vitals: HD stable, O2 stable  Gen: appropriate affect, no acute distress  Neuro: no focal defects, no sensory deficits  HEENT: EOMI, no JVD, normocephalic, atraumatic, no scleral icterus  Cardio: RRR, S1 S2 present, no murmurs, rubs, or gallops  Resp: lungs b/l CTA, chest wall intact  Abd: soft, nondistended, nontender  MSK: no gross joint abnormalities, no obvious swelling  Skin: no rashes, no wounds  heme: no ecchymosis or petechiae       Pt was stable for DC. Pt was given strict instructions and return precautions. pt was instructed to return to the ER if symptoms worsen at any time and to return to the ER at any time for any other medical concern. Pt was counseled on continuing home medications and new prescriptions.     I have spent more than 30 minutes of time on the discharge encounter which excludes separately reported services    please note that this is a summary of the medical record. please refer to the EMR for further details. 89 F with PMHx  of dementia (nonverbal), spinal stenosis, CVA (bedbound), macular degeneration, non healing sacral ulcer presents to ER with daughter for concern for wound infection.    #end of life care  -made CMO and awaiting hospice (hospice nurse to come next tuesday and pt will be able to be DCd on Monday before)    # sepsis   # infected sacral wound  - admit to medicine    - IVF,  IV abx : vanc / Cefepime -> stopped abx on 6/25 for CMO  - surgery consult - no surgical intervention  - wound care consult   - urine culture with e coli ESBL (result returned 6/28) but holding abx as CMO     # r/o PE   -  CT - Questionable filling defect in the right lower lobe segmental and   subsegmental pulmonary artery branches.  -CTA positive for subsegmental PE, will not do AC and spoke with daughter regarding this, pt is CMO    # malnutrition   # dysphagia   - speech and swallow consult   - CMO    pt seen and examined on the day of discharge  Vitals: HD stable, O2 stable  Gen: appropriate affect, no acute distress  Neuro: no focal defects, no sensory deficits  HEENT: EOMI, no JVD, normocephalic, atraumatic, no scleral icterus  Cardio: RRR, S1 S2 present, no murmurs, rubs, or gallops  Resp: lungs b/l CTA, chest wall intact  Abd: soft, nondistended, nontender  MSK: no gross joint abnormalities, no obvious swelling  Skin: no rashes, no wounds  heme: no ecchymosis or petechiae       Pt was stable for DC. Pt was given strict instructions and return precautions. pt was instructed to return to the ER if symptoms worsen at any time and to return to the ER at any time for any other medical concern. Pt was counseled on continuing home medications and new prescriptions.     I have spent more than 30 minutes of time on the discharge encounter which excludes separately reported services    please note that this is a summary of the medical record. please refer to the EMR for further details.

## 2025-06-30 NOTE — PROGRESS NOTE ADULT - ASSESSMENT
89F with dementia, nonverbal, spinal stenosis, CVA, bedbound, macular degeneration, non-healing sacral ulcer, here with concern for wound infection, and found to have sepsis from infected sacral wound, on IV vanco and cefepime, and with possible PE on lovenox, awaiting CTA. S&S pending as well for possible dysphagia. Palliative care consulted for GOC. Patient is DNR/DNI. Planned for hospice.     6/30: Patient resting comfortably.     Plan:  - minimal use of IV opioid and benzo over the weekend  - planned for discharge with hospice  - can discharge with roxanol solution 5mg q3h PRN for pain/dyspnea and ativan solution 0.5mg q4h PRN for anxiety    Discussed with primary team.     Palliative care will sign off.   Please call [phone] with questions or concerns 24/7.     _____________  Danilo Arriaza MD  Palliative Medicine  [facility]   of Geriatric and Palliative Medicine  [phone]

## 2025-07-01 PROCEDURE — 99239 HOSP IP/OBS DSCHRG MGMT >30: CPT

## 2025-07-01 RX ADMIN — Medication 0.2 MILLIGRAM(S): at 06:39

## 2025-07-01 RX ADMIN — Medication 0.2 MILLIGRAM(S): at 07:46

## 2025-07-01 RX ADMIN — SCOPOLAMINE 1 PATCH: 1 PATCH, EXTENDED RELEASE TRANSDERMAL at 07:46

## 2025-07-01 RX ADMIN — Medication 0.2 MILLIGRAM(S): at 02:09

## 2025-07-01 RX ADMIN — Medication 0.2 MILLIGRAM(S): at 08:07

## 2025-07-01 RX ADMIN — Medication 0.2 MILLIGRAM(S): at 03:09

## 2025-07-01 RX ADMIN — Medication 1 APPLICATION(S): at 06:39

## 2025-07-01 NOTE — DISCHARGE NOTE NURSING/CASE MANAGEMENT/SOCIAL WORK - PATIENT PORTAL LINK FT
You can access the FollowMyHealth Patient Portal offered by [facility] by registering at the following website: [email or web address]. By joining [employer]’s FollowMyHealth portal, you will also be able to view your health information using other applications (apps) compatible with our system.

## 2025-07-01 NOTE — DISCHARGE NOTE NURSING/CASE MANAGEMENT/SOCIAL WORK - FINANCIAL ASSISTANCE
[facility] provides services at a reduced cost to those who are determined to be eligible through [facility]’s financial assistance program. Information regarding [facility]’s financial assistance program can be found by going to [email or web address] or by calling [phone].

## 2025-07-01 NOTE — CHART NOTE - NSCHARTNOTEFT_GEN_A_CORE
CDI query response: The patient’s sepsis is ruled in (pt had temperature of 101 in ER and HR of 100)

## 2025-07-11 DIAGNOSIS — I48.20 CHRONIC ATRIAL FIBRILLATION, UNSPECIFIED: ICD-10-CM

## 2025-07-11 DIAGNOSIS — Z88.0 ALLERGY STATUS TO PENICILLIN: ICD-10-CM

## 2025-07-11 DIAGNOSIS — R13.10 DYSPHAGIA, UNSPECIFIED: ICD-10-CM

## 2025-07-11 DIAGNOSIS — I26.99 OTHER PULMONARY EMBOLISM WITHOUT ACUTE COR PULMONALE: ICD-10-CM

## 2025-07-11 DIAGNOSIS — Z66 DO NOT RESUSCITATE: ICD-10-CM

## 2025-07-11 DIAGNOSIS — Z86.73 PERSONAL HISTORY OF TRANSIENT ISCHEMIC ATTACK (TIA), AND CEREBRAL INFARCTION WITHOUT RESIDUAL DEFICITS: ICD-10-CM

## 2025-07-11 DIAGNOSIS — Z88.8 ALLERGY STATUS TO OTHER DRUGS, MEDICAMENTS AND BIOLOGICAL SUBSTANCES: ICD-10-CM

## 2025-07-11 DIAGNOSIS — H35.30 UNSPECIFIED MACULAR DEGENERATION: ICD-10-CM

## 2025-07-11 DIAGNOSIS — Z74.01 BED CONFINEMENT STATUS: ICD-10-CM

## 2025-07-11 DIAGNOSIS — L89.150 PRESSURE ULCER OF SACRAL REGION, UNSTAGEABLE: ICD-10-CM

## 2025-07-11 DIAGNOSIS — E43 UNSPECIFIED SEVERE PROTEIN-CALORIE MALNUTRITION: ICD-10-CM

## 2025-07-11 DIAGNOSIS — Z51.5 ENCOUNTER FOR PALLIATIVE CARE: ICD-10-CM

## 2025-07-11 DIAGNOSIS — A41.9 SEPSIS, UNSPECIFIED ORGANISM: ICD-10-CM
